# Patient Record
Sex: FEMALE | Race: WHITE | NOT HISPANIC OR LATINO | Employment: OTHER | URBAN - METROPOLITAN AREA
[De-identification: names, ages, dates, MRNs, and addresses within clinical notes are randomized per-mention and may not be internally consistent; named-entity substitution may affect disease eponyms.]

---

## 2017-01-03 PROBLEM — E78.6 LOW HDL (UNDER 40): Status: ACTIVE | Noted: 2017-01-03

## 2017-01-03 PROBLEM — E16.2 LOW BLOOD GLUCOSE MEASUREMENT: Status: ACTIVE | Noted: 2017-01-03

## 2017-01-16 PROBLEM — R53.1 WEAKNESS: Status: ACTIVE | Noted: 2017-01-16

## 2017-01-16 PROBLEM — R07.9 CHEST PAIN: Status: ACTIVE | Noted: 2017-01-16

## 2017-01-16 PROBLEM — R06.09 DOE (DYSPNEA ON EXERTION): Status: ACTIVE | Noted: 2017-01-16

## 2017-01-16 PROBLEM — R06.02 SOB (SHORTNESS OF BREATH): Status: ACTIVE | Noted: 2017-01-16

## 2017-01-16 PROBLEM — R55 SYNCOPE: Status: ACTIVE | Noted: 2017-01-16

## 2017-03-24 ENCOUNTER — OFFICE VISIT (OUTPATIENT)
Dept: UROLOGY | Facility: CLINIC | Age: 57
End: 2017-03-24
Payer: MEDICARE

## 2017-03-24 VITALS
RESPIRATION RATE: 18 BRPM | TEMPERATURE: 98 F | SYSTOLIC BLOOD PRESSURE: 150 MMHG | DIASTOLIC BLOOD PRESSURE: 102 MMHG | HEART RATE: 77 BPM

## 2017-03-24 DIAGNOSIS — N30.00 ACUTE CYSTITIS WITHOUT HEMATURIA: Primary | ICD-10-CM

## 2017-03-24 DIAGNOSIS — R33.9 INCOMPLETE EMPTYING OF BLADDER: ICD-10-CM

## 2017-03-24 DIAGNOSIS — R35.0 FREQUENCY OF MICTURITION: ICD-10-CM

## 2017-03-24 DIAGNOSIS — R35.1 NOCTURIA: ICD-10-CM

## 2017-03-24 LAB
BILIRUB SERPL-MCNC: ABNORMAL MG/DL
BLOOD URINE, POC: ABNORMAL
COLOR, POC UA: YELLOW
GLUCOSE UR QL STRIP: ABNORMAL
KETONES UR QL STRIP: ABNORMAL
LEUKOCYTE ESTERASE URINE, POC: ABNORMAL
NITRITE, POC UA: ABNORMAL
PH, POC UA: 5
PROTEIN, POC: ABNORMAL
SPECIFIC GRAVITY, POC UA: 1.01
UROBILINOGEN, POC UA: ABNORMAL

## 2017-03-24 PROCEDURE — 99204 OFFICE O/P NEW MOD 45 MIN: CPT | Mod: 25,S$GLB,, | Performed by: UROLOGY

## 2017-03-24 PROCEDURE — 3077F SYST BP >= 140 MM HG: CPT | Mod: S$GLB,,, | Performed by: UROLOGY

## 2017-03-24 PROCEDURE — 3080F DIAST BP >= 90 MM HG: CPT | Mod: S$GLB,,, | Performed by: UROLOGY

## 2017-03-24 PROCEDURE — 1160F RVW MEDS BY RX/DR IN RCRD: CPT | Mod: S$GLB,,, | Performed by: UROLOGY

## 2017-03-24 PROCEDURE — 81002 URINALYSIS NONAUTO W/O SCOPE: CPT | Mod: S$GLB,,, | Performed by: UROLOGY

## 2017-03-24 RX ORDER — DICYCLOMINE HYDROCHLORIDE 10 MG/1
10 CAPSULE ORAL
COMMUNITY
End: 2017-10-31

## 2017-03-24 RX ORDER — TAMSULOSIN HYDROCHLORIDE 0.4 MG/1
0.4 CAPSULE ORAL DAILY
Qty: 30 CAPSULE | Refills: 11 | Status: SHIPPED | OUTPATIENT
Start: 2017-03-24 | End: 2017-07-26

## 2017-03-24 NOTE — MR AVS SNAPSHOT
[place] [facility]  [address] [facility] [address]  Phone: [phone]  Fax: [phone]                  Janina Klein   3/24/2017 10:00 AM   Office Visit    Description:  Female : 1960   Provider:  Francoise Barrientos MD   Department:  [place] [facility]           Reason for Visit     Urinary Tract Infection           Diagnoses this Visit        Comments    Acute cystitis without hematuria    -  Primary            To Do List           Goals (5 Years of Data)     None       These Medications        Disp Refills Start End    tamsulosin (FLOMAX) 0.4 mg Cp24 30 capsule 11 3/24/2017 3/24/2018    Take 1 capsule (0.4 mg total) by mouth once daily. - Oral    Pharmacy: [facility] [address] [place] [address] AT [facility] [address] Ph #: [phone]         [facility] On Call     [facility] On Call Nurse Care Line -  Assistance  Registered nurses in the [facility] Center provide clinical advisement, health education, appointment booking, and other advisory services.  Call for this free service at [phone].             Medications           Message regarding Medications     Verify the changes and/or additions to your medication regime listed below are the same as discussed with your clinician today.  If any of these changes or additions are incorrect, please notify your healthcare provider.        START taking these NEW medications        Refills    tamsulosin (FLOMAX) 0.4 mg Cp24 11    Sig: Take 1 capsule (0.4 mg total) by mouth once daily.    Class: Normal    Route: Oral           Verify that the below list of medications is an accurate representation of the medications you are currently taking.  If none reported, the list may be blank. If incorrect, please contact your healthcare provider. Carry this list with you in case of emergency.           Current Medications     albuterol-ipratropium 2.5mg-0.5mg/3mL (DUO-NEB) 0.5 mg-3 mg(2.5 mg base)/3 mL  nebulizer solution USE 1 VIAL VIA NEBULIZER FOUR TIMES DAILY    compressor, for nebulizer Sabiha 1 each by Misc.(Non-Drug; Combo Route) route as directed.    dicyclomine (BENTYL) 10 MG capsule Take 10 mg by mouth 4 (four) times daily before meals and nightly.    gabapentin (NEURONTIN) 600 MG tablet Take 1 tablet (600 mg total) by mouth 2 (two) times daily.    hydrochlorothiazide (HYDRODIURIL) 12.5 MG Tab TAKE 1 TABLET BY MOUTH ONCE DAILY    metoprolol tartrate (LOPRESSOR) 50 MG tablet TAKE 1 TABLET BY MOUTH THREE TIMES DAILY    montelukast (SINGULAIR) 10 mg tablet TAKE 1 TABLET BY MOUTH EVERY DAY    pantoprazole (PROTONIX) 40 MG tablet TAKE 1 TABLET(40 MG) BY MOUTH TWICE DAILY    promethazine (PHENERGAN) 25 MG tablet TAKE 1 TABLET(25 MG) BY MOUTH EVERY 8 HOURS AS NEEDED FOR NAUSEA    sucralfate (CARAFATE) 1 gram tablet Take 1 tablet (1 g total) by mouth 4 (four) times daily before meals and nightly.    umeclidinium-vilanterol 62.5-25 mcg/actuation DsDv Inhale 1 puff into the lungs Daily.    albuterol 90 mcg/actuation inhaler Inhale 2 puffs into the lungs every 6 (six) hours as needed for Wheezing.    tamsulosin (FLOMAX) 0.4 mg Cp24 Take 1 capsule (0.4 mg total) by mouth once daily.           Clinical Reference Information           Your Vitals Were     BP Pulse Temp Resp          150/102 77 98.2 °F (36.8 °C) (Oral) 18        Blood Pressure          Most Recent Value    BP  (!)  150/102      Allergies as of 3/24/2017     Lisinopril      Immunizations Administered on Date of Encounter - 3/24/2017     None      Orders Placed During Today's Visit      Normal Orders This Visit    POCT URINE DIPSTICK WITHOUT MICROSCOPE          3/24/2017 10:30 AM - Suzi Rutherford MA      Component Results     Component    Color    yellow    Spec Grav    1.010    pH, UA    5.0    WBC, UA    neg    Nitrite    neg    Protein    trace    Glucose, UA    neg    Ketones, UA    neg    Urobilinogen    neg    Bilirubin    neg    Blood, UA    neg             Smoking Cessation     If you would like to quit smoking:   You may be eligible for free services if you are a [place] resident and started smoking cigarettes before September 1, 1988.  Call the Smoking Cessation Trust (SCT) toll free at (333) 108-1540 or (240) 750-2254.   Call 1-800-QUIT-NOW if you do not meet the above criteria.            Language Assistance Services     ATTENTION: Language assistance services are available, free of charge. Please call [phone].      ATENCIÓN: Si habla marianna, tiene a campoverde disposición servicios gratuitos de asistencia lingüística. Llame al [phone].     CHÚ Ý: N?u b?n nói Ti?ng Vi?t, có các d?ch v? h? tr? ngôn ng? mi?n phí dành cho b?n. G?i s? [phone].         [place] [facility] complies with applicable Federal civil rights laws and does not discriminate on the basis of race, color, national origin, age, disability, or sex.

## 2017-03-24 NOTE — PROGRESS NOTES
The patient was seen at [facility].    AGE:  57.    DRUG ALLERGIES:  Lisinopril.    CHIEF COMPLAINT:  Lower urinary tract symptoms with feeling of inadequate   bladder emptying.    HISTORY OF PRESENT ILLNESS:  This 57-year-old female presents with a two-year   history of lower urinary tract symptoms that consist of urgency and feeling of   inadequate emptying of the bladder, nocturia, and frequency.  She was placed on   a trial of bethanechol by Dr. Almanza, her gynecologist, but she did not   notice any improvement and she has since discontinued it.  She has no other   prior  history and has never undergone any prior  workup.    PAST MEDICAL HISTORY:  Hypertension, COPD, irritable bowel syndrome, GERD and   peptic ulcer disease.    PAST SURGICAL HISTORY:  She has had two ectopic pregnancies,  section,   hysterectomy, appendectomy and umbilical hernia repair.    PRESENT MEDICATIONS:  Include albuterol, Bentyl, Neurontin, hydrochlorothiazide,   Lopressor, Singulair, Protonix, Phenergan, carafate.    FAMILY HISTORY:  Grandmother had colon cancer.  Three sisters living and well.    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  Tobacco, still smokes a pack a day.  Alcohol none.    REVIEW OF SYMPTOMS:  GENERAL:  No weight change.  Good appetite.  HEAD AND NECK:  She does experience frequent episodes of headaches.  Wears   prescription glasses.  CARDIORESPIRATORY:  No chest pain or shortness of breath.  No cough.  GASTROINTESTINAL:  No trouble swallowing.  No constipation or diarrhea.  MUSCULOSKELETAL:  Having some chronic back problems, osteoarthritis, and carpal   tunnel.    PHYSICAL EXAMINATION:  VITAL SIGNS:  BP of 150/102, pulse 77, temperature 98.2, respiration 18.  GENERAL:  A well-developed, well-nourished 57-year-old female, alert, oriented,   cooperative, in no acute distress.  HEAD AND NECK:  Throat, clear.  No  adenopathy.  CHEST:  Clear.  HEART:  Regular.  No murmur.  ABDOMEN:  Soft, benign and nontender, but there is some fullness in the   suprapubic area.  PELVIC EXAMINATION:  Normal female introitus.  No mass.  No blood.  No   tenderness.  RECTAL:  No mass.  No blood.  No tenderness.    UA was negative with pH 5.0.    A bladder scan was obtained, which revealed 343 mL of residual urine.  The   patient subsequently proceeded to void again after the bladder scan and she   voided approximately 100 mL, so she still had over 200 mL of residual urine.    FINAL IMPRESSION:  Chronic lower urinary tract symptoms, incomplete bladder   emptying.    RECOMMENDATIONS:  Renal ultrasound, trial on Flomax 0.4 mg p.o. daily, further    workup such as cystoscopy and urodynamic evaluation may also need to be   considered.  This was discussed with the patient and she stated she understood   and agreed.      QUINTON  dd: 03/24/2017 15:47:42 (CDT)  td: 03/25/2017 05:59:38 (CDT)  Doc ID   #[id number]  Job ID #[id number]    CC: Song Almanza M.D.

## 2017-03-24 NOTE — LETTER
March 24, 2017      Song Almanza MD  [address]  [facility] [place] [address]           [place] [facility]  [address] [facility] [place] [address]  Phone: [phone]  Fax: [phone]          Patient: Janina Klein   MR Number: [id number]   YOB: 1960   Date of Visit: 3/24/2017       Dear Dr. Song Almanza:    Thank you for referring Janina Klein to me for evaluation. Attached you will find relevant portions of my assessment and plan of care.    If you have questions, please do not hesitate to call me. I look forward to following Janina Klein along with you.    Sincerely,    Francoise Barrientos MD    Enclosure  CC:  No Recipients    If you would like to receive this communication electronically, please contact [email or web address] or [phone] to request more information on [employer] Link access.    For providers and/or their staff who would like to refer a patient to [facility], please contact us through our one-stop-shop provider referral line, [facility], at [phone].    If you feel you have received this communication in error or would no longer like to receive these types of communications, please e-mail [email or web address]

## 2017-04-04 PROBLEM — Z86.69 H/O CARPAL TUNNEL SYNDROME: Status: ACTIVE | Noted: 2017-04-04

## 2017-04-04 PROBLEM — R39.14 FEELING OF INCOMPLETE BLADDER EMPTYING: Status: ACTIVE | Noted: 2017-04-04

## 2017-04-04 PROBLEM — R35.1 NOCTURIA: Status: ACTIVE | Noted: 2017-04-04

## 2017-04-04 PROBLEM — R20.2 PARESTHESIA: Status: ACTIVE | Noted: 2017-04-04

## 2017-04-04 PROBLEM — Z79.899 USES NEBULIZER AND INHALER AT HOME: Status: ACTIVE | Noted: 2017-04-04

## 2017-04-04 PROBLEM — R35.0 FREQUENCY OF URINATION: Status: ACTIVE | Noted: 2017-04-04

## 2017-04-07 ENCOUNTER — OFFICE VISIT (OUTPATIENT)
Dept: UROLOGY | Facility: CLINIC | Age: 57
End: 2017-04-07
Payer: MEDICARE

## 2017-04-07 VITALS — DIASTOLIC BLOOD PRESSURE: 91 MMHG | HEART RATE: 98 BPM | SYSTOLIC BLOOD PRESSURE: 137 MMHG | TEMPERATURE: 98 F

## 2017-04-07 DIAGNOSIS — R33.9 RETENTION OF URINE: ICD-10-CM

## 2017-04-07 DIAGNOSIS — R33.9 INCOMPLETE EMPTYING OF BLADDER: Primary | ICD-10-CM

## 2017-04-07 PROCEDURE — 3075F SYST BP GE 130 - 139MM HG: CPT | Mod: S$GLB,,, | Performed by: UROLOGY

## 2017-04-07 PROCEDURE — 1160F RVW MEDS BY RX/DR IN RCRD: CPT | Mod: S$GLB,,, | Performed by: UROLOGY

## 2017-04-07 PROCEDURE — 99213 OFFICE O/P EST LOW 20 MIN: CPT | Mod: S$GLB,,, | Performed by: UROLOGY

## 2017-04-07 PROCEDURE — 3080F DIAST BP >= 90 MM HG: CPT | Mod: S$GLB,,, | Performed by: UROLOGY

## 2017-04-07 RX ORDER — FAMOTIDINE 40 MG/1
TABLET, FILM COATED ORAL
Refills: 11 | COMMUNITY
Start: 2017-03-30 | End: 2017-07-26 | Stop reason: SDUPTHER

## 2017-04-07 RX ORDER — LANSOPRAZOLE 30 MG/1
CAPSULE, DELAYED RELEASE ORAL
Refills: 11 | COMMUNITY
Start: 2017-03-30 | End: 2017-07-26 | Stop reason: SDUPTHER

## 2017-04-07 RX ORDER — UMECLIDINIUM 62.5 UG/1
AEROSOL, POWDER ORAL
Refills: 11 | COMMUNITY
Start: 2017-04-01 | End: 2017-04-19

## 2017-04-07 NOTE — PROGRESS NOTES
This patient was seen at [facility].    CHIEF COMPLAINT:  Incomplete bladder emptying, chronic lower urinary tract   symptoms.    HISTORY OF PRESENT ILLNESS:  This 57-year-old female returns for routine   recheck.  She has a long-term history of lower tract symptoms with incomplete   bladder emptying and at her last visit on 03/24/2017, she was placed on a trial   of Flomax 0.4 mg p.o. daily and on today's visit, she states she has noticed   significant improvement in her voiding.  She did undergo a renal ultrasound on   04/06/2017, which did reveal normal kidneys, but there was increased postvoid   residual of 302 mL.    On physical exam, abdomen is soft, benign.  No masses, no tenderness.    Bladder scan revealed 206 mL of postvoid residual, which is an improvement over   the 302 that was noted on the ultrasound also compared to her prior visit.  UA,   none given.    FINAL IMPRESSION:  Incomplete bladder emptying, chronic lower urinary tract   symptoms.    RECOMMENDATIONS:  Continue with the Flomax 0.4 mg p.o. daily.  It was discussed   with the patient that we will need to proceed with further workup to determine   further information concerning her incomplete bladder emptying and recommended   that we proceed with cystoscopy, which will be scheduled in two weeks on   04/21/2017.  She stated she understood and agreed and orders were placed and   consent signed.      QUINTON  dd: 04/07/2017 09:18:34 (CDT)  td: 04/08/2017 05:07:03 (CDT)  Doc ID   #[id number]  Job ID #[id number]    CC:

## 2017-04-17 ENCOUNTER — TELEPHONE (OUTPATIENT)
Dept: ORTHOPEDICS | Facility: CLINIC | Age: 57
End: 2017-04-17

## 2017-04-17 ENCOUNTER — OFFICE VISIT (OUTPATIENT)
Dept: ORTHOPEDICS | Facility: CLINIC | Age: 57
End: 2017-04-17
Payer: MEDICARE

## 2017-04-17 VITALS
BODY MASS INDEX: 28.34 KG/M2 | WEIGHT: 166 LBS | DIASTOLIC BLOOD PRESSURE: 80 MMHG | SYSTOLIC BLOOD PRESSURE: 115 MMHG | HEART RATE: 78 BPM | HEIGHT: 64 IN

## 2017-04-17 DIAGNOSIS — G56.03 BILATERAL CARPAL TUNNEL SYNDROME: Primary | ICD-10-CM

## 2017-04-17 PROCEDURE — 99213 OFFICE O/P EST LOW 20 MIN: CPT | Mod: S$GLB,,, | Performed by: ORTHOPAEDIC SURGERY

## 2017-04-17 PROCEDURE — 1160F RVW MEDS BY RX/DR IN RCRD: CPT | Mod: S$GLB,,, | Performed by: ORTHOPAEDIC SURGERY

## 2017-04-17 PROCEDURE — 3074F SYST BP LT 130 MM HG: CPT | Mod: S$GLB,,, | Performed by: ORTHOPAEDIC SURGERY

## 2017-04-17 PROCEDURE — 3079F DIAST BP 80-89 MM HG: CPT | Mod: S$GLB,,, | Performed by: ORTHOPAEDIC SURGERY

## 2017-04-17 NOTE — PROGRESS NOTES
Past Medical History:   Diagnosis Date    Chronic pain     COPD (chronic obstructive pulmonary disease)     GERD (gastroesophageal reflux disease)     Hypertension     IBS (irritable bowel syndrome)     [facility] 2004    Palpitations        Past Surgical History:   Procedure Laterality Date    APPENDECTOMY      COLONOSCOPY  2013    ? results in [place]    COLONOSCOPY  04/17/2015    Dr. Farfan   diverticulosis    HERNIA REPAIR      HYSTERECTOMY  9/3/2014    with bso    TOTAL ABDOMINAL HYSTERECTOMY W/ BILATERAL SALPINGOOPHORECTOMY Bilateral        Current Outpatient Prescriptions   Medication Sig    albuterol-ipratropium 2.5mg-0.5mg/3mL (DUO-NEB) 0.5 mg-3 mg(2.5 mg base)/3 mL nebulizer solution USE 1 VIAL VIA NEBULIZER FOUR TIMES DAILY    compressor, for nebulizer Sabiha 1 each by Misc.(Non-Drug; Combo Route) route as directed.    dicyclomine (BENTYL) 10 MG capsule Take 10 mg by mouth 4 (four) times daily before meals and nightly.    famotidine (PEPCID) 40 MG tablet     gabapentin (NEURONTIN) 300 MG capsule TAKE ONE CAPSULE BY MOUTH TWICE DAILY    gabapentin (NEURONTIN) 600 MG tablet Take 1 tablet (600 mg total) by mouth 2 (two) times daily.    hydrochlorothiazide (HYDRODIURIL) 12.5 MG Tab TAKE 1 TABLET BY MOUTH ONCE DAILY    INCRUSE ELLIPTA 62.5 mcg/actuation DsDv INHALE 1 PUFF INTO THE LUNGS ONCE D    lansoprazole (PREVACID) 30 MG capsule TK 1 C PO QD    metoprolol tartrate (LOPRESSOR) 50 MG tablet TAKE 1 TABLET BY MOUTH THREE TIMES DAILY    montelukast (SINGULAIR) 10 mg tablet TAKE 1 TABLET BY MOUTH EVERY DAY    pantoprazole (PROTONIX) 40 MG tablet TAKE 1 TABLET(40 MG) BY MOUTH TWICE DAILY    promethazine (PHENERGAN) 25 MG tablet TAKE 1 TABLET(25 MG) BY MOUTH EVERY 8 HOURS AS NEEDED FOR NAUSEA    sucralfate (CARAFATE) 1 gram tablet Take 1 tablet (1 g total) by mouth 4 (four) times daily before meals and nightly.    tamsulosin (FLOMAX) 0.4 mg Cp24 Take 1 capsule (0.4 mg  total) by mouth once daily.    umeclidinium-vilanterol 62.5-25 mcg/actuation DsDv Inhale 1 puff into the lungs Daily.    albuterol 90 mcg/actuation inhaler Inhale 2 puffs into the lungs every 6 (six) hours as needed for Wheezing.     No current facility-administered medications for this visit.        Review of patient's allergies indicates:   Allergen Reactions    Lisinopril        History reviewed. No pertinent family history.    Social History     Social History    Marital status:      Spouse name: N/A    Number of children: N/A    Years of education: N/A     Occupational History    Not on file.     Social History Main Topics    Smoking status: Current Every Day Smoker     Packs/day: 1.00    Smokeless tobacco: Not on file    Alcohol use Not on file    Drug use: Not on file    Sexual activity: Not on file     Other Topics Concern    Not on file     Social History Narrative       Chief Complaint:   Chief Complaint   Patient presents with    Wrist Pain     BILATERAL WRIST PAIN       Consulting Physician: No ref. provider found    History of present illness: This is a 56-year-old woman with a complaint of bilateral wrist pain for years.  She reports numbness in her median 3 fingers.  She denies any injury.  She states that she has had a EMG of her wrist that demonstrated carpal tunnel.  She puts her pain at a constant 9 out of 10.  She states that both the right and left are equal.  She is right-hand dominant.  The injections that we gave her at her last visit were not helpful.      Review of Systems:    Constitution: Denies chills, fever, and sweats.    HENT: Denies headaches or blurry vision.    Cardiovascular: Denies chest pain or irregular heart beat.    Respiratory: Denies cough or shortness of breath.    Gastrointestinal: Denies abdominal pain, nausea, or vomiting.    Musculoskeletal:  Denies muscle cramps.    Neurological: Denies dizziness or focal weakness.    Psychiatric/Behavioral:  Normal mental status.    Hematologic/Lymphatic: Denies bleeding problem or easy bruising/bleeding.    Skin: Denies rash or suspicious lesions.      Examination:    Vital Signs:    Vitals:    04/17/17 1420   BP: 115/80   Pulse: 78       Body mass index is 28.49 kg/(m^2).    This a well-developed, well nourished patient in no acute distress.    Alert and oriented and cooperative to examination.       Physical Exam: Left Wrist Exam    Skin  Scars:   None  Rash:   None    Inspection  Erythema:  None  Bruising:  None  Swelling:  None  Masses  None  Lymphadenopathy: None    Coordination:  Normal  Instability:  None    Range of Motion  Volar Flexion:  Normal  Dorsal Extension: Normal  Radial Deviation: Normal  Ulnar Deviation: Normal  Finger ROM:  Full    Strength:  Normal     Tenderness  Radial:   None  Ulnar:   None  Snuffbox:  None    Pulse:   2+ radial  Sensation:  Intact    Tinel's:   Negative  Finkelstein's:  Negative      Right Wrist Exam    Skin  Scars:   None  Rash:   None    Inspection  Erythema:  None  Bruising:  None  Swelling:  None  Masses  None  Lymphadenopathy: None    Coordination:  Normal  Instability:  None    Range of Motion  Volar Flexion:  Normal  Dorsal Extension: Normal  Radial Deviation: Normal  Ulnar Deviation: Normal  Finger ROM:  Full    Strength:  Normal     Tenderness  Radial:   None  Ulnar:   None  Snuffbox:  None    Pulse:   2+ radial  Sensation:  Intact    Tinel's:   Negative  Finkelstein's:  Negative          Imaging:      Assessment: Bilateral carpal tunnel syndrome        Plan:  The injections were not helpful and she is interested in having surgery.  We will refer her down to see our hand surgeon.    DISCLAIMER: This note may have been dictated using voice recognition software and may contain grammatical errors.     NOTE: Consult report sent to referring provider via [employer].

## 2017-04-17 NOTE — TELEPHONE ENCOUNTER
----- Message from Callie Alberto LPN sent at 4/17/2017  2:45 PM CDT -----  Please contact pt to schedule consult for carpal tunnel surgery.  Thank you.  Callie

## 2017-04-19 PROBLEM — E78.1 HIGH TRIGLYCERIDES: Status: ACTIVE | Noted: 2017-04-19

## 2017-04-20 ENCOUNTER — TELEPHONE (OUTPATIENT)
Dept: UROLOGY | Facility: CLINIC | Age: 57
End: 2017-04-20

## 2017-04-20 PROBLEM — E87.6: Status: ACTIVE | Noted: 2017-04-20

## 2017-04-20 PROBLEM — E87.6 HYPOKALEMIA: Status: ACTIVE | Noted: 2017-04-20

## 2017-04-20 NOTE — TELEPHONE ENCOUNTER
----- Message from Stacie Callaway sent at 4/20/2017 10:47 AM CDT -----  Patient has an appointment on 5/5/17. She states that she was supposed to see doctor tomorrow in [place]. She would like to talk to office concerning this. Please call back at [phone].

## 2017-04-20 NOTE — TELEPHONE ENCOUNTER
Spoke with patient and informed her that the 5/5/17 appt is her follow up appt . Patient also states she is scheduled to see Dr Brown at 1:30 p.m today in regard to low potassium. Will follow up after appt in regard to cysto scheduled with dr Barrientos on tomorrow

## 2017-04-20 NOTE — TELEPHONE ENCOUNTER
Spoke with lindsay with [facility] informed that due to critical potassium level of 2.8 that was called in procedure that was scheduled for 4/21/17 has been canceled per RENATO Rose NP. Patient is to have  Potassium rechecked in one week. Dr Barrientos informed

## 2017-04-20 NOTE — TELEPHONE ENCOUNTER
Received call from [facility]  Melva who reported at critical Potassium level. Pt is scheduled for cysto tomorrow at [facility]. Informed Dr Barrientos who requested a call be placed to Dr Brown Office to see what he would like to do in regards to the low potassium level. Spoke with remedios who than had me speak with a nurse who did not give her name     with Dr brown office who was  informed of critical value level the nurse stated that the provider is in clinic. I asked if there was any way  we can get provider recommendations. The nurse  states again the provider is in clinic and that  I would be able to view the note in EPIC. Telephone number/ callback number still provided.  I ask for the nurse's name and the call ended

## 2017-04-20 NOTE — TELEPHONE ENCOUNTER
Spoke with patient informed procedure is on hold. Verbalized understanding awaiting  next weeks labs and clearance by PCP

## 2017-04-27 ENCOUNTER — TELEPHONE (OUTPATIENT)
Dept: UROLOGY | Facility: CLINIC | Age: 57
End: 2017-04-27

## 2017-04-27 NOTE — TELEPHONE ENCOUNTER
----- Message from Shruti Fonseca sent at 4/27/2017  2:31 PM CDT -----  Patient requesting to speak with nurse concerning scheduling procedure/please call back at [phone].

## 2017-04-27 NOTE — TELEPHONE ENCOUNTER
Spoke with patient informed that clearance has been received. Will give to Dr Barrientos to review

## 2017-05-22 PROBLEM — R06.09 DOE (DYSPNEA ON EXERTION): Status: RESOLVED | Noted: 2017-01-16 | Resolved: 2017-05-22

## 2017-05-22 PROBLEM — R06.02 SOB (SHORTNESS OF BREATH): Status: RESOLVED | Noted: 2017-01-16 | Resolved: 2017-05-22

## 2017-05-22 PROBLEM — R35.0 FREQUENCY OF URINATION: Status: RESOLVED | Noted: 2017-04-04 | Resolved: 2017-05-22

## 2017-05-22 PROBLEM — R53.1 WEAKNESS: Status: RESOLVED | Noted: 2017-01-16 | Resolved: 2017-05-22

## 2017-05-22 PROBLEM — R07.9 CHEST PAIN: Status: RESOLVED | Noted: 2017-01-16 | Resolved: 2017-05-22

## 2017-05-22 PROBLEM — R35.1 NOCTURIA: Status: RESOLVED | Noted: 2017-04-04 | Resolved: 2017-05-22

## 2017-05-22 PROBLEM — R39.14 FEELING OF INCOMPLETE BLADDER EMPTYING: Status: RESOLVED | Noted: 2017-04-04 | Resolved: 2017-05-22

## 2017-07-14 ENCOUNTER — OFFICE VISIT (OUTPATIENT)
Dept: UROLOGY | Facility: CLINIC | Age: 57
End: 2017-07-14
Payer: MEDICARE

## 2017-07-14 VITALS
SYSTOLIC BLOOD PRESSURE: 137 MMHG | HEIGHT: 64 IN | RESPIRATION RATE: 18 BRPM | HEART RATE: 76 BPM | TEMPERATURE: 98 F | DIASTOLIC BLOOD PRESSURE: 94 MMHG | WEIGHT: 165 LBS | BODY MASS INDEX: 28.17 KG/M2

## 2017-07-14 PROCEDURE — 99213 OFFICE O/P EST LOW 20 MIN: CPT | Mod: S$GLB,,, | Performed by: UROLOGY

## 2017-07-14 RX ORDER — AMITRIPTYLINE HYDROCHLORIDE 25 MG/1
TABLET, FILM COATED ORAL
Refills: 6 | COMMUNITY
Start: 2017-06-29 | End: 2017-10-31

## 2017-07-14 RX ORDER — UMECLIDINIUM 62.5 UG/1
AEROSOL, POWDER ORAL
Refills: 11 | COMMUNITY
Start: 2017-06-13 | End: 2017-07-29

## 2017-07-14 NOTE — PROGRESS NOTES
This patient was seen at [facility]    CHIEF COMPLAINT:  Urethral stenosis.    HISTORY OF PRESENT ILLNESS:  This 57-year-old female returns for routine   recheck.  She had undergone cystoscopy and urethral dilatation of a severe   urethral stenosis on 05/05/2017.  Since then, she states she has been voiding   much better and is back to normal and is very happy with her voiding status and   no longer needed to take the Flomax.    UA, none given.    FINAL IMPRESSION:  Urethral stenosis that has responded well to urethral   dilatation.    RECOMMENDATIONS:  Continue with observation with recheck in four months.      MD/JUAN  dd: 07/14/2017 10:37:37 (CDT)  td: 07/15/2017 11:28:21 (CDT)  Doc ID   #[id number]  Job ID #[id number]    CC:

## 2017-07-29 PROBLEM — R55 SYNCOPE: Status: RESOLVED | Noted: 2017-01-16 | Resolved: 2017-07-29

## 2017-07-29 PROBLEM — Z87.11 HISTORY OF STOMACH ULCERS: Status: ACTIVE | Noted: 2017-04-04

## 2017-07-29 PROBLEM — Z87.19 HISTORY OF BARRETT'S ESOPHAGUS: Status: ACTIVE | Noted: 2017-07-29

## 2017-07-29 PROBLEM — E87.6 HYPOKALEMIA: Status: RESOLVED | Noted: 2017-04-20 | Resolved: 2017-07-29

## 2017-07-29 PROBLEM — E16.2 LOW BLOOD GLUCOSE MEASUREMENT: Status: RESOLVED | Noted: 2017-01-03 | Resolved: 2017-07-29

## 2017-08-24 PROBLEM — J06.9 UPPER RESPIRATORY TRACT INFECTION: Status: ACTIVE | Noted: 2017-08-24

## 2018-01-05 ENCOUNTER — OFFICE VISIT (OUTPATIENT)
Dept: PAIN MEDICINE | Facility: CLINIC | Age: 58
End: 2018-01-05
Payer: MEDICARE

## 2018-01-05 VITALS
SYSTOLIC BLOOD PRESSURE: 138 MMHG | WEIGHT: 160 LBS | BODY MASS INDEX: 27.31 KG/M2 | HEIGHT: 64 IN | HEART RATE: 79 BPM | DIASTOLIC BLOOD PRESSURE: 91 MMHG

## 2018-01-05 DIAGNOSIS — M47.896 OTHER SPONDYLOSIS, LUMBAR REGION: Primary | ICD-10-CM

## 2018-01-05 PROCEDURE — 99204 OFFICE O/P NEW MOD 45 MIN: CPT | Mod: S$GLB,,, | Performed by: ANESTHESIOLOGY

## 2018-01-05 PROCEDURE — 99999 PR PBB SHADOW E&M-EST. PATIENT-LVL III: CPT | Mod: PBBFAC,,, | Performed by: ANESTHESIOLOGY

## 2018-01-05 NOTE — PROGRESS NOTES
This note was completed with dictation software and grammatical errors may exist.    Referring Physician: Sherlyn Emery    PCP: Brant Brown III, MD    CC:  Low back pain    HPI:   Janina Klein is a 57 y.o. female who has lived with low back pain for ~ 30 years. 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 Over the past few years her low back pain has worsened, particularly when she is doing pushing, pulling maneuvers, raking, or mopping. The pain begins in her back and radiates laterally. Describes as burning, tight, tingling, & numb. Does describe radiation down legs, however intermittently, approximately once a month. The back pain is more bothersome. She has attempted physical therapy, which worsened her pain. Heat packs help. Was placed on gabapentin 800 mg TID, which helps some with her back pain, more so with her other complaints of neck and hand pain. She was referred by her PCP for further options.     ROS:  CONSTITUTIONAL: No fevers, chills, night sweats, wt. loss, appetite changes  SKIN: no rashes or itching  ENT: No headaches, head trauma, vision changes, or eye pain  LYMPH NODES: None noticed   CV: No chest pain, palpitations.   RESP: No shortness of breath, dyspnea on exertion, cough, wheezing, or hemoptysis  GI: No nausea, emesis, diarrhea, constipation, melena, hematochezia, pain.    : No dysuria, hematuria, urgency, or frequency   HEME: No easy bruising, bleeding problems  PSYCHIATRIC: No depression, anxiety, psychosis, hallucinations.  NEURO: No seizures, memory loss, dizziness or difficulty sleeping  MSK: +HPI      Past Medical History:   Diagnosis Date    Chronic pain     COPD (chronic obstructive pulmonary disease)     GERD (gastroesophageal reflux disease)     Hypertension     IBS (irritable bowel syndrome)     [facility] Diagnosed 2004    Palpitations      Past Surgical History:   Procedure Laterality Date    APPENDECTOMY      COLONOSCOPY  2013    ? results in [place]  "   COLONOSCOPY  04/17/2015    Dr. Farfan   diverticulosis    CYSTOSCOPY      HERNIA REPAIR      HYSTERECTOMY  9/3/2014    with bso    TOTAL ABDOMINAL HYSTERECTOMY W/ BILATERAL SALPINGOOPHORECTOMY Bilateral      History reviewed. No pertinent family history.  Social History     Social History    Marital status:      Spouse name: N/A    Number of children: N/A    Years of education: N/A     Social History Main Topics    Smoking status: Current Every Day Smoker     Packs/day: 1.00    Smokeless tobacco: None    Alcohol use None    Drug use: Unknown    Sexual activity: Not Asked     Other Topics Concern    None     Social History Narrative    None         Medications/Allergies: See med card    Vitals:    01/05/18 0944   BP: (!) 138/91   Pulse: 79   Weight: 72.6 kg (160 lb)   Height: 5' 4" (1.626 m)   PainSc: 10-Worst pain ever   PainLoc: Back         Physical exam:    GENERAL: A and O x3, the patient appears well groomed and is in no acute distress.  Skin: No rashes or obvious lesions  HEENT: normocephalic, atraumatic  CARDIOVASCULAR:  Palpable peripheral pulses  LUNGS: easy work of breathing  ABDOMEN: soft, nontender   UPPER EXTREMITIES: Normal alignment, normal range of motion, no atrophy, no skin changes,  hair growth and nail growth normal and equal bilaterally. No swelling, no tenderness.    LOWER EXTREMITIES:  Normal alignment, normal range of motion, no atrophy, no skin changes,  hair growth and nail growth normal and equal bilaterally. No swelling, no tenderness.  LUMBAR SPINE  Lumbar spine: ROM is full with flexion extension and oblique extension with increased pain upon flexion and extension  Thrust & Darrion's test causes no increased pain on either side.    Supine straight leg raise is negative bilaterally.    Internal and external rotation of the hip causes no increased pain on either side.  Myofascial exam: Tender to palpation along facet joints and paraspinous muscles of ~ " L4-L5      MENTAL STATUS: normal orientation, speech, language, and fund of knowledge for social situation.  Emotional state appropriate.    CRANIAL NERVES:  II:  PERRL bilaterally,   III,IV,VI: EOMI.    V:  Facial sensation equal bilaterally  VII:  Facial motor function normal.  VIII:  Hearing equal to finger rub bilaterally  IX/X: Gag normal, palate symmetric  XI:  Shoulder shrug equal, head turn equal  XII:  Tongue midline without fasciculations      MOTOR: Tone and bulk: normal bilateral lower Strength: normal     IP ADD ABD Quad TA Gas HAM  R 5 5 5 5 5 5 5  L 5 5 5 5 5 5 5    SENSATION: Light touch and pinprick intact bilaterally  REFLEXES: normal, symmetric, nonbrisk.  Toes down, no clonus. No hoffmans.  GAIT: normal rise, base, steps, and arm swing.        Imaging:  MRI L-spine 10/2016  T12/L1: A mild bulge is seen but no canal stenosis. The neural foramina  are patent.  L1/2: Unremarkable.  L2/3: Unremarkable.  L3/4: A mild bulge is seen. No canal stenosis is seen. The neural  foramina are patent.  L4/5: A mild diffuse bulge is seen. No significant canal stenosis is  noted. The neural foramina are patent bilaterally.  L5/S1: A mild diffuse bulge is seen which is progressive. No central  canal stenosis is noted. The neural foramina are patent.    Assessment:  58 yo female with longstanding history of back pain with rare radicular symptoms referred for further treatment options.   1. Other spondylosis, lumbar region          Plan:  - I have stressed the importance of physical activity and exercise to improve overall health  - I believe her low back pain maybe due to facet arthropathy and have recommended lumbar medial branch blocks as a diagnostic procedure.  If successful, would proceed with radiofrequency ablation.  She will consider this option and contact us if she wishes to proceed  - Continue gabapentin as prescribed  - She will follow up as needed

## 2018-01-23 PROBLEM — Z79.899 USES NEBULIZER AND INHALER AT HOME: Status: RESOLVED | Noted: 2017-04-04 | Resolved: 2018-01-23

## 2018-01-23 PROBLEM — R20.2 PARESTHESIA: Status: RESOLVED | Noted: 2017-04-04 | Resolved: 2018-01-23

## 2018-01-23 PROBLEM — J06.9 UPPER RESPIRATORY TRACT INFECTION: Status: RESOLVED | Noted: 2017-08-24 | Resolved: 2018-01-23

## 2018-04-30 ENCOUNTER — OFFICE VISIT (OUTPATIENT)
Dept: SURGERY | Facility: CLINIC | Age: 58
End: 2018-04-30
Payer: MEDICARE

## 2018-04-30 VITALS
RESPIRATION RATE: 18 BRPM | DIASTOLIC BLOOD PRESSURE: 90 MMHG | OXYGEN SATURATION: 95 % | TEMPERATURE: 98 F | BODY MASS INDEX: 27.31 KG/M2 | WEIGHT: 160 LBS | HEIGHT: 64 IN | HEART RATE: 67 BPM | SYSTOLIC BLOOD PRESSURE: 148 MMHG

## 2018-04-30 DIAGNOSIS — R19.5 HEME POSITIVE STOOL: Primary | ICD-10-CM

## 2018-04-30 DIAGNOSIS — K52.9 CHRONIC DIARRHEA: ICD-10-CM

## 2018-04-30 DIAGNOSIS — K22.70 BARRETT'S ESOPHAGUS WITHOUT DYSPLASIA: ICD-10-CM

## 2018-04-30 DIAGNOSIS — J44.9 CHRONIC OBSTRUCTIVE PULMONARY DISEASE, UNSPECIFIED COPD TYPE: ICD-10-CM

## 2018-04-30 PROBLEM — E87.6: Status: RESOLVED | Noted: 2017-04-20 | Resolved: 2018-04-30

## 2018-04-30 PROBLEM — E78.6 LOW HDL (UNDER 40): Status: RESOLVED | Noted: 2017-01-03 | Resolved: 2018-04-30

## 2018-04-30 PROCEDURE — 99205 OFFICE O/P NEW HI 60 MIN: CPT | Mod: S$GLB,,, | Performed by: SURGERY

## 2018-04-30 PROCEDURE — 3077F SYST BP >= 140 MM HG: CPT | Mod: CPTII,S$GLB,, | Performed by: SURGERY

## 2018-04-30 PROCEDURE — 3080F DIAST BP >= 90 MM HG: CPT | Mod: CPTII,S$GLB,, | Performed by: SURGERY

## 2018-04-30 RX ORDER — ACETAMINOPHEN AND CODEINE PHOSPHATE 300; 30 MG/1; MG/1
1 TABLET ORAL 2 TIMES DAILY PRN
Refills: 0 | COMMUNITY
Start: 2018-03-27 | End: 2018-10-22 | Stop reason: ALTCHOICE

## 2018-04-30 RX ORDER — FAMOTIDINE 40 MG/1
40 TABLET, FILM COATED ORAL DAILY
COMMUNITY
End: 2018-10-22 | Stop reason: ALTCHOICE

## 2018-04-30 RX ORDER — SODIUM CHLORIDE, SODIUM LACTATE, POTASSIUM CHLORIDE, CALCIUM CHLORIDE 600; 310; 30; 20 MG/100ML; MG/100ML; MG/100ML; MG/100ML
INJECTION, SOLUTION INTRAVENOUS CONTINUOUS
Status: CANCELLED | OUTPATIENT
Start: 2018-04-30

## 2018-04-30 RX ORDER — SODIUM, POTASSIUM,MAG SULFATES 17.5-3.13G
SOLUTION, RECONSTITUTED, ORAL ORAL
Qty: 2 BOTTLE | Refills: 0 | Status: SHIPPED | OUTPATIENT
Start: 2018-04-30 | End: 2019-10-29

## 2018-04-30 RX ORDER — GABAPENTIN 100 MG/1
800 CAPSULE ORAL 3 TIMES DAILY
COMMUNITY
Start: 2018-04-01 | End: 2018-04-30

## 2018-04-30 RX ORDER — BACLOFEN 10 MG/1
10 TABLET ORAL 2 TIMES DAILY
COMMUNITY
Start: 2018-03-09 | End: 2018-10-22 | Stop reason: ALTCHOICE

## 2018-04-30 RX ORDER — LIDOCAINE HYDROCHLORIDE 10 MG/ML
1 INJECTION, SOLUTION EPIDURAL; INFILTRATION; INTRACAUDAL; PERINEURAL ONCE
Status: CANCELLED | OUTPATIENT
Start: 2018-04-30 | End: 2018-04-30

## 2018-04-30 NOTE — PROGRESS NOTES
Subjective:       Patient ID: Janina Klein is a 58 y.o. female.    Chief Complaint: Consult and Gall Bladder Problem      HPI:  Ms. Klein presents today as a consult from Lisa Cooksey NP-C.  Ms. Klein has complaints of chronic postprandial nausea and diarrhea.  Previous evaluation several years ago included an EGD, colonoscopy, ultrasound, and HIDA scan.  No abdominal pain.  No vomiting.  No fever, chills, night sweats, weight loss, constipation, melena, hematochezia, hematemesis, jaundice, biliuria, acholia, etc.  No other associated symptoms.  No specific food intolerances.  No other aggravating factors.  No alleviating factors other than over-the-counter Imodium A-D.    EGD 6/16/15 confirmed a healing gastric ulcer EGD 4/17/15 revealed two deep cratered pyloric channel ulcers.  Biopsies confirmed Yousif's esophagus with no dysplasia and healing gastric ulcers with no evidence of Helicobacter.  Colonoscopy 4/17/15 revealed mild diverticulosis of the sigmoid colon and descending colon.    Gallbladder ultrasound 3/26/15 did not reveal any evidence of cholelithiasis, cholecystitis, dilated bile ducts, etc.  HIDA scan with CCK 4/1/15 documented an ejection fraction of 92%.        Allergies & Meds:  Review of patient's allergies indicates:   Allergen Reactions    Lisinopril        Current Outpatient Prescriptions   Medication Sig Dispense Refill    albuterol-ipratropium 2.5mg-0.5mg/3mL (DUO-NEB) 0.5 mg-3 mg(2.5 mg base)/3 mL nebulizer solution USE 1 VIAL VIA NEBULIZER FOUR TIMES DAILY 360 mL 9    ANORO ELLIPTA 62.5-25 mcg/actuation DsDv INHALE 1 PUFF INTO THE LUNGS DAILY 60 each 1    famotidine (PEPCID) 40 MG tablet Take 40 mg by mouth once daily.      gabapentin (NEURONTIN) 800 MG tablet Take 1 tablet (800 mg total) by mouth 3 (three) times daily. 90 tablet 2    metoprolol tartrate (LOPRESSOR) 50 MG tablet TAKE 1 TABLET BY MOUTH TWICE DAILY 180 tablet 0    montelukast (SINGULAIR) 10 mg tablet TAKE 1 TABLET BY  MOUTH EVERY DAY 90 tablet 1    pantoprazole (PROTONIX) 40 MG tablet TAKE 1 TABLET(40 MG) BY MOUTH EVERY DAY 90 tablet 11    promethazine (PHENERGAN) 25 MG tablet TAKE 1 TABLET(25 MG) BY MOUTH EVERY 8 HOURS AS NEEDED FOR NAUSEA 30 tablet 0    acetaminophen-codeine 300-30mg (TYLENOL #3) 300-30 mg Tab Take 1 tablet by mouth 2 (two) times daily as needed.  0    albuterol 90 mcg/actuation inhaler Inhale 2 puffs into the lungs every 6 (six) hours as needed for Wheezing. 18 g 11    baclofen (LIORESAL) 10 MG tablet Take 10 mg by mouth 2 (two) times daily.      sodium,potassium,mag sulfates (SUPREP BOWEL PREP KIT) 17.5-3.13-1.6 gram SolR Follow written instructions provided by Clinic 2 Bottle 0     No current facility-administered medications for this visit.        PMFSHx:  Past Medical History:   Diagnosis Date    Anxiety     Arthritis     osteoarthritis    Chronic pain     Chronic pain     COPD (chronic obstructive pulmonary disease)     GERD (gastroesophageal reflux disease)     History of stomach ulcers     Hypertension     IBS (irritable bowel syndrome)     [facility] Diagnosed 2004    Palpitations        Past Surgical History:   Procedure Laterality Date    APPENDECTOMY       SECTION      COLONOSCOPY      ? [place] in [place]    COLONOSCOPY  2015    Dr. Farfan   diverticulosis    CYSTOSCOPY      HERNIA REPAIR      HYSTERECTOMY  9/3/2014    with bso    TOTAL ABDOMINAL HYSTERECTOMY W/ BILATERAL SALPINGOOPHORECTOMY Bilateral        Family History   Problem Relation Age of Onset    Hypertension Mother     Diabetes Sister     Colon cancer Maternal Grandmother        Social History   Substance Use Topics    Smoking status: Current Every Day Smoker     Packs/day: 0.50     Types: Cigarettes    Smokeless tobacco: Never Used    Alcohol use No       Review of Systems   Constitutional: Negative for appetite change, chills, fatigue, fever and unexpected weight change.    HENT: Negative for congestion, dental problem, ear pain, mouth sores, postnasal drip, rhinorrhea, sore throat, tinnitus, trouble swallowing and voice change.    Eyes: Negative for photophobia, pain, discharge and visual disturbance.   Respiratory: Negative for cough, chest tightness, shortness of breath and wheezing.    Cardiovascular: Negative for chest pain, palpitations and leg swelling.   Gastrointestinal: Negative for abdominal pain, blood in stool, constipation and vomiting.   Endocrine: Negative for cold intolerance, heat intolerance, polydipsia, polyphagia and polyuria.   Genitourinary: Negative for difficulty urinating, dysuria, flank pain, frequency, hematuria and urgency.   Musculoskeletal: Negative for arthralgias, joint swelling and myalgias.   Skin: Negative for color change and rash.   Allergic/Immunologic: Negative for immunocompromised state.   Neurological: Negative for dizziness, tremors, seizures, syncope, speech difficulty, weakness, numbness and headaches.   Hematological: Negative for adenopathy. Does not bruise/bleed easily.   Psychiatric/Behavioral: Negative for agitation, confusion, hallucinations, self-injury and suicidal ideas. The patient is not nervous/anxious.        Objective:      Physical Exam   Constitutional: She is oriented to person, place, and time. She appears well-developed and well-nourished. She is active.  Non-toxic appearance. No distress.   HENT:   Head: Normocephalic and atraumatic.   Right Ear: Hearing and external ear normal. No drainage or tenderness.   Left Ear: Hearing and external ear normal. No drainage or tenderness.   Nose: Nose normal. No rhinorrhea. No epistaxis.   Mouth/Throat: Uvula is midline, oropharynx is clear and moist and mucous membranes are normal. Mucous membranes are not pale, not dry and not cyanotic. No oropharyngeal exudate.   Eyes: Conjunctivae and lids are normal. Pupils are equal, round, and reactive to light. Right eye exhibits no  discharge and no exudate. Left eye exhibits no discharge and no exudate. Right conjunctiva is not injected. Right eye exhibits no nystagmus. Left eye exhibits no nystagmus.   Neck: Trachea normal, full passive range of motion without pain and phonation normal. No JVD present. Carotid bruit is not present. No tracheal deviation present. No thyroid mass and no thyromegaly present.   Cardiovascular: Normal rate, regular rhythm, S1 normal, S2 normal, normal heart sounds and intact distal pulses.  PMI is not displaced.  Exam reveals no gallop and no friction rub.    No murmur heard.  Pulmonary/Chest: Effort normal and breath sounds normal. No accessory muscle usage. No respiratory distress. She exhibits no mass, no tenderness and no crepitus. Right breast exhibits no inverted nipple, no mass, no nipple discharge, no skin change and no tenderness. Left breast exhibits no inverted nipple, no mass, no nipple discharge, no skin change and no tenderness. Breasts are symmetrical.   Abdominal: Soft. Normal appearance and bowel sounds are normal. She exhibits no distension, no fluid wave, no abdominal bruit and no mass. There is no hepatosplenomegaly. There is no tenderness. There is no rebound, no guarding and negative Ch's sign. No hernia. Hernia confirmed negative in the right inguinal area and confirmed negative in the left inguinal area.   Genitourinary: Vagina normal. Rectal exam shows guaiac positive stool. There is no rash or lesion on the right labia. There is no rash or lesion on the left labia. Right adnexum displays no mass. Left adnexum displays no mass. No vaginal discharge found.   Musculoskeletal:        Cervical back: Normal.        Thoracic back: Normal.        Lumbar back: Normal.        Right upper arm: Normal.        Left upper arm: Normal.        Right forearm: Normal.        Left forearm: Normal.        Right hand: Normal.        Left hand: Normal.        Right upper leg: Normal.        Left upper leg:  Normal.        Right lower leg: Normal.        Left lower leg: Normal.        Right foot: Normal.        Left foot: Normal.   Lymphadenopathy:        Head (right side): No submental, no submandibular and no posterior auricular adenopathy present.        Head (left side): No submental, no submandibular and no posterior auricular adenopathy present.     She has no cervical adenopathy.     She has no axillary adenopathy.        Right: No inguinal and no supraclavicular adenopathy present.        Left: No inguinal and no supraclavicular adenopathy present.   Neurological: She is alert and oriented to person, place, and time. She has normal strength. No cranial nerve deficit or sensory deficit. Coordination and gait normal. GCS eye subscore is 4. GCS verbal subscore is 5. GCS motor subscore is 6.   Skin: Skin is warm, dry and intact. No rash noted. No cyanosis. Nails show no clubbing.   Psychiatric: She has a normal mood and affect. Her speech is normal. Judgment and thought content normal. Her mood appears not anxious. Her affect is not inappropriate. She is not actively hallucinating. She does not exhibit a depressed mood.         Test Results:  Lab results were reviewed from 4/24/18.  CBC showed no evidence of leukocytosis, anemia, or thrombocytopenia.  Red cell indices and white cell differential pattern unremarkable.  Electrolytes were all in the range of normal.  BUN and creatinine showed no evidence of renal dysfunction.  Glucose showed no suspicion of diabetes.  Liver profile showed no evidence of hepatocellular disease or biliary obstruction.    EGD, colonoscopy, and pathology reports were reviewed from 2015 with the above findings confirmed.  Gallbladder ultrasound and HIDA scan films and report reviewed from 2015 with the above findings confirmed.    Assessment:      Hemoccult positive stool.  Chronic diarrhea.  Chronic postprandial nausea.  Elevated gallbladder ejection fraction on HIDA scan.  History of  ulcer disease.  History of Yousif's esophagus.  Differential diagnosis includes but is not limited to gastrointestinal malignancies, ulcer disease, chronic cholecystitis, inflammatory bowel disease, diverticulosis, etc.  Endoscopy warranted.  Options at this time include but are not limited to endoscopy, second opinion, cholecystectomy, additional radiologic studies, etc.  1. Heme positive stool    2. Chronic diarrhea    3. Yousif's esophagus without dysplasia    4. Chronic obstructive pulmonary disease, unspecified COPD type        Plan:   Heme positive stool  -     Case Request Operating Room: COLONOSCOPY, ESOPHAGOGASTRODUODENOSCOPY (EGD)  -     Place in Outpatient; Standing  -     Vital signs; Standing  -     Insert peripheral IV; Standing  -     Verify beta-blocker dose taken within 24 hours if patient is prescribed beta-blocker; Standing  -     Height and weight; Standing  -     Verify discontinuation of antithrombotics; Standing  -     Verify blood consent; Standing  -     Verify consent; Standing  -     Void on call to Operating Room; Standing  -     Diet NPO; Standing  -     Early ambulation; Standing  -     Pulse Oximetry Q4H; Standing  -     EKG 12-lead; Future    Chronic diarrhea    Yousif's esophagus without dysplasia    Chronic obstructive pulmonary disease, unspecified COPD type    Other orders  -     sodium,potassium,mag sulfates (SUPREP BOWEL PREP KIT) 17.5-3.13-1.6 gram SolR; Follow written instructions provided by Clinic  Dispense: 2 Bottle; Refill: 0  -     lidocaine (PF) 10 mg/ml (1%) injection 10 mg; Inject 1 mL (10 mg total) into the skin once.  -     lactated ringers infusion; Inject into the vein continuous.  -     IP VTE LOW RISK PATIENT; Standing        Follow-up in about 1 month (around 5/30/2018) for routine post-endosocpy visit.    Counseling/Medical Decision Making:    Janina Klein was counseled regarding the results of the evaluation thus far and the differential diagnosis.   Diagnostic and therapeutic options were discussed in detail along with the risks, benefits, possible complications, details of, and indications for each option.  Diagnoses discussed included but were not limited to: GB disease, GERD, hiatal hernia, PUD, gastritis, duodenitis, Sphincter of Oddi dysfunction, hemorrhoids, diverticulosis, cancer, IBD, IBS, benign tumors, angiodysplasias, ischemic disease.  Options discussed included but were not limited to: EGD, colonoscopy, proctoscopy, anoscopy, sigmoidoscopy, radiologic studies, PillCam, empiric therapy, second opinion, etc. Possible complications of endoscopy discussed included but were not limited to: bleeding, infections, endocarditis, injury to internal organs, incomplete exam, failure to diagnose cancer or other serious condition, need for emergency surgery, need for a temporary colostomy, need for additional operations or procedures, etc.  Entire conversation was held in layman's terms.  All unfamiliar terms were defined.  Questions solicited and answered.  I read the consent form to her verbatim.  Krames booklets were provided on EGD, GERD, GB disease / surgery, colonoscopy, polyps, diverticulosis, hemorrhoids, cancer, etc.  A copy of the consent form was provided for her review outside the office / hospital prior to the procedures.  At the conclusion of the conversation she voiced complete understanding of all we discussed and satisfaction that all of her questions had been answered to her full understanding.  She stated that she felt fully informed and completely capable of making an informed decision.  She requests and desires to proceed with EGD and colonoscopy.    Total face-to-face encounter time was 60 minutes, 40 minutes spent counseling as outlined/summarized above.

## 2018-04-30 NOTE — H&P
[facility] - General Surgery  General Surgery  H&P      Patient Name: Janina Klein  MRN: [id number]        Chief Complaint: Consult and Gall Bladder Problem      HPI:  Ms. Klein presents today as a consult from Lisa Cooksey NP-C.  Ms. Klein has complaints of chronic postprandial nausea and diarrhea.  Previous evaluation several years ago included an EGD, colonoscopy, ultrasound, and HIDA scan.  No abdominal pain.  No vomiting.  No fever, chills, night sweats, weight loss, constipation, melena, hematochezia, hematemesis, jaundice, biliuria, acholia, etc.  No other associated symptoms.  No specific food intolerances.  No other aggravating factors.  No alleviating factors other than over-the-counter Imodium A-D.    EGD 6/16/15 confirmed a healing gastric ulcer EGD 4/17/15 revealed two deep cratered pyloric channel ulcers.  Biopsies confirmed Yousif's esophagus with no dysplasia and healing gastric ulcers with no evidence of Helicobacter.  Colonoscopy 4/17/15 revealed mild diverticulosis of the sigmoid colon and descending colon.    Gallbladder ultrasound 3/26/15 did not reveal any evidence of cholelithiasis, cholecystitis, dilated bile ducts, etc.  HIDA scan with CCK 4/1/15 documented an ejection fraction of 92%.        Allergies & Meds:  Review of patient's allergies indicates:   Allergen Reactions    Lisinopril        Current Outpatient Prescriptions   Medication Sig Dispense Refill    albuterol-ipratropium 2.5mg-0.5mg/3mL (DUO-NEB) 0.5 mg-3 mg(2.5 mg base)/3 mL nebulizer solution USE 1 VIAL VIA NEBULIZER FOUR TIMES DAILY 360 mL 9    ANORO ELLIPTA 62.5-25 mcg/actuation DsDv INHALE 1 PUFF INTO THE LUNGS DAILY 60 each 1    famotidine (PEPCID) 40 MG tablet Take 40 mg by mouth once daily.      gabapentin (NEURONTIN) 800 MG tablet Take 1 tablet (800 mg total) by mouth 3 (three) times daily. 90 tablet 2    metoprolol tartrate (LOPRESSOR) 50 MG tablet TAKE 1 TABLET BY MOUTH TWICE DAILY 180 tablet 0     montelukast (SINGULAIR) 10 mg tablet TAKE 1 TABLET BY MOUTH EVERY DAY 90 tablet 1    pantoprazole (PROTONIX) 40 MG tablet TAKE 1 TABLET(40 MG) BY MOUTH EVERY DAY 90 tablet 11    promethazine (PHENERGAN) 25 MG tablet TAKE 1 TABLET(25 MG) BY MOUTH EVERY 8 HOURS AS NEEDED FOR NAUSEA 30 tablet 0    acetaminophen-codeine 300-30mg (TYLENOL #3) 300-30 mg Tab Take 1 tablet by mouth 2 (two) times daily as needed.  0    albuterol 90 mcg/actuation inhaler Inhale 2 puffs into the lungs every 6 (six) hours as needed for Wheezing. 18 g 11    baclofen (LIORESAL) 10 MG tablet Take 10 mg by mouth 2 (two) times daily.      sodium,potassium,mag sulfates (SUPREP BOWEL PREP KIT) 17.5-3.13-1.6 gram SolR Follow written instructions provided by Clinic 2 Bottle 0     No current facility-administered medications for this visit.        PMFSHx:  Past Medical History:   Diagnosis Date    Anxiety     Arthritis     osteoarthritis    Chronic pain     Chronic pain     COPD (chronic obstructive pulmonary disease)     GERD (gastroesophageal reflux disease)     History of stomach ulcers     Hypertension     IBS (irritable bowel syndrome)     [facility]    Palpitations        Past Surgical History:   Procedure Laterality Date    APPENDECTOMY       SECTION      [place] in [place]    COLONOSCOPY  2015    Dr. Farfan   diverticulosis    CYSTOSCOPY      HERNIA REPAIR      HYSTERECTOMY  9/3/2014    with bso    TOTAL ABDOMINAL HYSTERECTOMY W/ BILATERAL SALPINGOOPHORECTOMY Bilateral        Family History   Problem Relation Age of Onset    Hypertension Mother     Diabetes Sister     Colon cancer Maternal Grandmother        Social History   Substance Use Topics    Smoking status: Current Every Day Smoker     Packs/day: 0.50     Types: Cigarettes    Smokeless tobacco: Never Used    Alcohol use No       Review of Systems   Constitutional: Negative for appetite change,  chills, fatigue, fever and unexpected weight change.   HENT: Negative for congestion, dental problem, ear pain, mouth sores, postnasal drip, rhinorrhea, sore throat, tinnitus, trouble swallowing and voice change.    Eyes: Negative for photophobia, pain, discharge and visual disturbance.   Respiratory: Negative for cough, chest tightness, shortness of breath and wheezing.    Cardiovascular: Negative for chest pain, palpitations and leg swelling.   Gastrointestinal: Negative for abdominal pain, blood in stool, constipation and vomiting.   Endocrine: Negative for cold intolerance, heat intolerance, polydipsia, polyphagia and polyuria.   Genitourinary: Negative for difficulty urinating, dysuria, flank pain, frequency, hematuria and urgency.   Musculoskeletal: Negative for arthralgias, joint swelling and myalgias.   Skin: Negative for color change and rash.   Allergic/Immunologic: Negative for immunocompromised state.   Neurological: Negative for dizziness, tremors, seizures, syncope, speech difficulty, weakness, numbness and headaches.   Hematological: Negative for adenopathy. Does not bruise/bleed easily.   Psychiatric/Behavioral: Negative for agitation, confusion, hallucinations, self-injury and suicidal ideas. The patient is not nervous/anxious.        Objective:      Physical Exam   Constitutional: She is oriented to person, place, and time. She appears well-developed and well-nourished. She is active.  Non-toxic appearance. No distress.   HENT:   Head: Normocephalic and atraumatic.   Right Ear: Hearing and external ear normal. No drainage or tenderness.   Left Ear: Hearing and external ear normal. No drainage or tenderness.   Nose: Nose normal. No rhinorrhea. No epistaxis.   Mouth/Throat: Uvula is midline, oropharynx is clear and moist and mucous membranes are normal. Mucous membranes are not pale, not dry and not cyanotic. No oropharyngeal exudate.   Eyes: Conjunctivae and lids are normal. Pupils are equal, round,  and reactive to light. Right eye exhibits no discharge and no exudate. Left eye exhibits no discharge and no exudate. Right conjunctiva is not injected. Right eye exhibits no nystagmus. Left eye exhibits no nystagmus.   Neck: Trachea normal, full passive range of motion without pain and phonation normal. No JVD present. Carotid bruit is not present. No tracheal deviation present. No thyroid mass and no thyromegaly present.   Cardiovascular: Normal rate, regular rhythm, S1 normal, S2 normal, normal heart sounds and intact distal pulses.  PMI is not displaced.  Exam reveals no gallop and no friction rub.    No murmur heard.  Pulmonary/Chest: Effort normal and breath sounds normal. No accessory muscle usage. No respiratory distress. She exhibits no mass, no tenderness and no crepitus. Right breast exhibits no inverted nipple, no mass, no nipple discharge, no skin change and no tenderness. Left breast exhibits no inverted nipple, no mass, no nipple discharge, no skin change and no tenderness. Breasts are symmetrical.   Abdominal: Soft. Normal appearance and bowel sounds are normal. She exhibits no distension, no fluid wave, no abdominal bruit and no mass. There is no hepatosplenomegaly. There is no tenderness. There is no rebound, no guarding and negative Ch's sign. No hernia. Hernia confirmed negative in the right inguinal area and confirmed negative in the left inguinal area.   Genitourinary: Vagina normal. Rectal exam shows guaiac positive stool. There is no rash or lesion on the right labia. There is no rash or lesion on the left labia. Right adnexum displays no mass. Left adnexum displays no mass. No vaginal discharge found.   Musculoskeletal:        Cervical back: Normal.        Thoracic back: Normal.        Lumbar back: Normal.        Right upper arm: Normal.        Left upper arm: Normal.        Right forearm: Normal.        Left forearm: Normal.        Right hand: Normal.        Left hand: Normal.         Right upper leg: Normal.        Left upper leg: Normal.        Right lower leg: Normal.        Left lower leg: Normal.        Right foot: Normal.        Left foot: Normal.   Lymphadenopathy:        Head (right side): No submental, no submandibular and no posterior auricular adenopathy present.        Head (left side): No submental, no submandibular and no posterior auricular adenopathy present.     She has no cervical adenopathy.     She has no axillary adenopathy.        Right: No inguinal and no supraclavicular adenopathy present.        Left: No inguinal and no supraclavicular adenopathy present.   Neurological: She is alert and oriented to person, place, and time. She has normal strength. No cranial nerve deficit or sensory deficit. Coordination and gait normal. GCS eye subscore is 4. GCS verbal subscore is 5. GCS motor subscore is 6.   Skin: Skin is warm, dry and intact. No rash noted. No cyanosis. Nails show no clubbing.   Psychiatric: She has a normal mood and affect. Her speech is normal. Judgment and thought content normal. Her mood appears not anxious. Her affect is not inappropriate. She is not actively hallucinating. She does not exhibit a depressed mood.         Test Results:  Lab results were reviewed from 4/24/18.  CBC showed no evidence of leukocytosis, anemia, or thrombocytopenia.  Red cell indices and white cell differential pattern unremarkable.  Electrolytes were all in the range of normal.  BUN and creatinine showed no evidence of renal dysfunction.  Glucose showed no suspicion of diabetes.  Liver profile showed no evidence of hepatocellular disease or biliary obstruction.    EGD, colonoscopy, and pathology reports were reviewed from 2015 with the above findings confirmed.  Gallbladder ultrasound and HIDA scan films and report reviewed from 2015 with the above findings confirmed.    Assessment:      Hemoccult positive stool.  Chronic diarrhea.  Chronic postprandial nausea.  Elevated gallbladder  ejection fraction on HIDA scan.  History of ulcer disease.  History of Yousif's esophagus.  Differential diagnosis includes but is not limited to gastrointestinal malignancies, ulcer disease, chronic cholecystitis, inflammatory bowel disease, diverticulosis, etc.  Endoscopy warranted.  Options at this time include but are not limited to endoscopy, second opinion, cholecystectomy, additional radiologic studies, etc.  1. Heme positive stool    2. Chronic diarrhea    3. Yousif's esophagus without dysplasia    4. Chronic obstructive pulmonary disease, unspecified COPD type        Plan:   Heme positive stool  -     Case Request Operating Room: COLONOSCOPY, ESOPHAGOGASTRODUODENOSCOPY (EGD)  -     Place in Outpatient; Standing  -     Vital signs; Standing  -     Insert peripheral IV; Standing  -     Verify beta-blocker dose taken within 24 hours if patient is prescribed beta-blocker; Standing  -     Height and weight; Standing  -     Verify discontinuation of antithrombotics; Standing  -     Verify blood consent; Standing  -     Verify consent; Standing  -     Void on call to Operating Room; Standing  -     Diet NPO; Standing  -     Early ambulation; Standing  -     Pulse Oximetry Q4H; Standing  -     EKG 12-lead; Future    Chronic diarrhea    Yousif's esophagus without dysplasia    Chronic obstructive pulmonary disease, unspecified COPD type    Other orders  -     sodium,potassium,mag sulfates (SUPREP BOWEL PREP KIT) 17.5-3.13-1.6 gram SolR; Follow written instructions provided by Clinic  Dispense: 2 Bottle; Refill: 0  -     lidocaine (PF) 10 mg/ml (1%) injection 10 mg; Inject 1 mL (10 mg total) into the skin once.  -     lactated ringers infusion; Inject into the vein continuous.  -     IP VTE LOW RISK PATIENT; Standing        Follow-up in about 1 month (around 5/30/2018) for routine post-endosocpy visit.    Counseling/Medical Decision Making:    Janina Klein was counseled regarding the results of the evaluation thus  far and the differential diagnosis.  Diagnostic and therapeutic options were discussed in detail along with the risks, benefits, possible complications, details of, and indications for each option.  Diagnoses discussed included but were not limited to: GB disease, GERD, hiatal hernia, PUD, gastritis, duodenitis, Sphincter of Oddi dysfunction, hemorrhoids, diverticulosis, cancer, IBD, IBS, benign tumors, angiodysplasias, ischemic disease.  Options discussed included but were not limited to: EGD, colonoscopy, proctoscopy, anoscopy, sigmoidoscopy, radiologic studies, PillCam, empiric therapy, second opinion, etc. Possible complications of endoscopy discussed included but were not limited to: bleeding, infections, endocarditis, injury to internal organs, incomplete exam, failure to diagnose cancer or other serious condition, need for emergency surgery, need for a temporary colostomy, need for additional operations or procedures, etc.  Entire conversation was held in layman's terms.  All unfamiliar terms were defined.  Questions solicited and answered.  I read the consent form to her verbatim.  Krames booklets were provided on EGD, GERD, GB disease / surgery, colonoscopy, polyps, diverticulosis, hemorrhoids, cancer, etc.  A copy of the consent form was provided for her review outside the office / hospital prior to the procedures.  At the conclusion of the conversation she voiced complete understanding of all we discussed and satisfaction that all of her questions had been answered to her full understanding.  She stated that she felt fully informed and completely capable of making an informed decision.  She requests and desires to proceed with EGD and colonoscopy.    Total face-to-face encounter time was 60 minutes, 40 minutes spent counseling as outlined/summarized above.

## 2018-04-30 NOTE — LETTER
April 30, 2018      Lisa Y. Cooksey, AYESHA  [address]  [place]  [place] [address]           [facility] - General Surgery  [address] [facility] [place] [address]  Phone: [phone]  Fax: [phone]          Patient: Janina Klein   MR Number: [id number]   YOB: 1960   Date of Visit: 4/30/2018       Dear Lisa Y. Cooksey:    Thank you for referring Janina Klein to me for evaluation. Attached you will find relevant portions of my assessment and plan of care.    If you have questions, please do not hesitate to call me. I look forward to following Janina Klein along with you.    Sincerely,    Casey Farfan MD    Enclosure  CC:  No Recipients    If you would like to receive this communication electronically, please contact [email or web address] or [phone] to request more information on [employer] Link access.    For providers and/or their staff who would like to refer a patient to [facility], please contact us through our one-stop-shop provider referral line, [facility], at [phone].    If you feel you have received this communication in error or would no longer like to receive these types of communications, please e-mail [email or web address]

## 2018-05-07 ENCOUNTER — TELEPHONE (OUTPATIENT)
Dept: SURGERY | Facility: CLINIC | Age: 58
End: 2018-05-07

## 2018-05-07 NOTE — TELEPHONE ENCOUNTER
----- Message from Augusta Gonzalez sent at 5/7/2018 10:31 AM CDT -----  Type:  Test Results    Who Called: ;pt  Name of Test (Lab/Mammo/Etc): ekg  Date of Test: na  Ordering Provider:  [facility]  Where the test was performed:[facility]  Best Call Back Number:  [phone]  Additional Information:   Calling  To see if EKG  Results were  receive  for Dr christos melo

## 2018-11-12 ENCOUNTER — TELEPHONE (OUTPATIENT)
Dept: SURGERY | Facility: CLINIC | Age: 58
End: 2018-11-12

## 2018-11-14 ENCOUNTER — TELEPHONE (OUTPATIENT)
Dept: SURGERY | Facility: CLINIC | Age: 58
End: 2018-11-14

## 2019-05-16 ENCOUNTER — HOSPITAL ENCOUNTER (OUTPATIENT)
Dept: RADIOLOGY | Facility: HOSPITAL | Age: 59
Discharge: HOME | End: 2019-05-16
Attending: NURSE PRACTITIONER
Payer: MEDICARE

## 2019-05-16 DIAGNOSIS — M54.6 ACUTE BILATERAL THORACIC BACK PAIN: ICD-10-CM
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
## 2019-05-16 DIAGNOSIS — Z12.31 ENCOUNTER FOR SCREENING MAMMOGRAM FOR BREAST CANCER: ICD-10-CM

## 2019-05-16 DIAGNOSIS — M54.50 LOW BACK PAIN, NON-SPECIFIC: ICD-10-CM

## 2019-05-16 DIAGNOSIS — M53.3 PAIN IN THE COCCYX: ICD-10-CM

## 2019-05-16 PROCEDURE — 77067 SCR MAMMO BI INCL CAD: CPT | Mod: 26,,, | Performed by: RADIOLOGY

## 2019-05-16 PROCEDURE — 77063 MAMMO DIGITAL SCREENING BILAT WITH TOMOSYNTHESIS_CAD: ICD-10-PCS | Mod: 26,,, | Performed by: RADIOLOGY

## 2019-05-16 PROCEDURE — 77067 SCR MAMMO BI INCL CAD: CPT | Mod: TC

## 2019-05-16 PROCEDURE — 77063 BREAST TOMOSYNTHESIS BI: CPT | Mod: 26,,, | Performed by: RADIOLOGY

## 2019-05-16 PROCEDURE — 77067 MAMMO DIGITAL SCREENING BILAT WITH TOMOSYNTHESIS_CAD: ICD-10-PCS | Mod: 26,,, | Performed by: RADIOLOGY

## 2019-10-29 ENCOUNTER — HOSPITAL ENCOUNTER (EMERGENCY)
Facility: HOSPITAL | Age: 59
Discharge: HOME | End: 2019-10-29
Attending: INTERNAL MEDICINE
Payer: MEDICARE

## 2019-10-29 VITALS
WEIGHT: 143 LBS | TEMPERATURE: 98 F | HEIGHT: 64 IN | DIASTOLIC BLOOD PRESSURE: 94 MMHG | OXYGEN SATURATION: 93 % | SYSTOLIC BLOOD PRESSURE: 126 MMHG | RESPIRATION RATE: 17 BRPM | HEART RATE: 75 BPM | BODY MASS INDEX: 24.41 KG/M2

## 2019-10-29 DIAGNOSIS — J44.1 COPD EXACERBATION: Primary | ICD-10-CM

## 2019-10-29 DIAGNOSIS — R06.02 SHORTNESS OF BREATH: ICD-10-CM

## 2019-10-29 PROCEDURE — 99284 EMERGENCY DEPT VISIT MOD MDM: CPT | Mod: 25

## 2019-10-29 PROCEDURE — 94761 N-INVAS EAR/PLS OXIMETRY MLT: CPT

## 2019-10-29 PROCEDURE — 71045 X-RAY EXAM CHEST 1 VIEW: CPT | Mod: TC,FY

## 2019-10-29 PROCEDURE — 93005 ELECTROCARDIOGRAM TRACING: CPT

## 2019-10-29 PROCEDURE — 96374 THER/PROPH/DIAG INJ IV PUSH: CPT

## 2019-10-29 PROCEDURE — 93010 ELECTROCARDIOGRAM REPORT: CPT | Mod: ,,, | Performed by: INTERNAL MEDICINE

## 2019-10-29 PROCEDURE — 94640 AIRWAY INHALATION TREATMENT: CPT

## 2019-10-29 PROCEDURE — 25000242 PHARM REV CODE 250 ALT 637 W/ HCPCS: Performed by: INTERNAL MEDICINE

## 2019-10-29 PROCEDURE — 71045 XR CHEST AP PORTABLE: ICD-10-PCS | Mod: 26,,, | Performed by: RADIOLOGY

## 2019-10-29 PROCEDURE — 71045 X-RAY EXAM CHEST 1 VIEW: CPT | Mod: 26,,, | Performed by: RADIOLOGY

## 2019-10-29 PROCEDURE — 63600175 PHARM REV CODE 636 W HCPCS: Performed by: INTERNAL MEDICINE

## 2019-10-29 PROCEDURE — 93010 EKG 12-LEAD: ICD-10-PCS | Mod: ,,, | Performed by: INTERNAL MEDICINE

## 2019-10-29 RX ORDER — IPRATROPIUM BROMIDE AND ALBUTEROL SULFATE 2.5; .5 MG/3ML; MG/3ML
3 SOLUTION RESPIRATORY (INHALATION)
Status: COMPLETED | OUTPATIENT
Start: 2019-10-29 | End: 2019-10-29

## 2019-10-29 RX ORDER — IBUPROFEN 200 MG
1 TABLET ORAL DAILY
Refills: 11 | COMMUNITY
Start: 2019-10-29 | End: 2019-11-07

## 2019-10-29 RX ORDER — DEXAMETHASONE SODIUM PHOSPHATE 100 MG/10ML
10 INJECTION INTRAMUSCULAR; INTRAVENOUS
Status: COMPLETED | OUTPATIENT
Start: 2019-10-29 | End: 2019-10-29

## 2019-10-29 RX ORDER — METHYLPREDNISOLONE 4 MG/1
TABLET ORAL
Qty: 1 PACKAGE | Refills: 0 | Status: SHIPPED | OUTPATIENT
Start: 2019-10-29 | End: 2019-11-07

## 2019-10-29 RX ADMIN — IPRATROPIUM BROMIDE AND ALBUTEROL SULFATE 3 ML: .5; 3 SOLUTION RESPIRATORY (INHALATION) at 03:10

## 2019-10-29 RX ADMIN — DEXAMETHASONE SODIUM PHOSPHATE 10 MG: 10 INJECTION INTRAMUSCULAR; INTRAVENOUS at 03:10

## 2019-10-29 NOTE — ED TRIAGE NOTES
3 wks ago pulled muscle in back.  Next thing you know I feel it in my side.  Now I feel sharp pain all the time.

## 2019-10-29 NOTE — ED PROVIDER NOTES
Encounter Date: 10/29/2019       History     Chief Complaint   Patient presents with    Shortness of Breath     speaks in full sentences without difficulty.     Chest Pain     from coughing, has COPD     Patient comes in with shortness of breath and coughing.  She coming also complained of chest tightness.  She has a history of COPD.  She continues to smoke.  She is not producing any sputum no fever.  She was concerned it could be are heart wanted checked out.        Review of patient's allergies indicates:   Allergen Reactions    Lisinopril      Past Medical History:   Diagnosis Date    Anxiety     Arthritis     osteoarthritis    Chronic pain     Chronic pain     COPD (chronic obstructive pulmonary disease)     GERD (gastroesophageal reflux disease)     History of stomach ulcers     Hypertension     IBS (irritable bowel syndrome)     [facility] 2004    Palpitations      Past Surgical History:   Procedure Laterality Date    APPENDECTOMY       SECTION      [place] in [place]    COLONOSCOPY  2015    Dr. Farfan   diverticulosis    CYSTOSCOPY      HERNIA REPAIR      HYSTERECTOMY  9/3/2014    with bso    TOTAL ABDOMINAL HYSTERECTOMY W/ BILATERAL SALPINGOOPHORECTOMY Bilateral      Family History   Problem Relation Age of Onset    Hypertension Mother     Diabetes Sister     Colon cancer Maternal Grandmother     Breast cancer Other      Social History     Tobacco Use    Smoking status: Current Every Day Smoker     Packs/day: 0.50     Types: Cigarettes    Smokeless tobacco: Never Used   Substance Use Topics    Alcohol use: No    Drug use: No     Review of Systems   Constitutional: Negative for fever.   HENT: Negative for sore throat.    Respiratory: Positive for chest tightness, shortness of breath and wheezing.    Cardiovascular: Negative for chest pain.   Gastrointestinal: Negative for nausea.   Genitourinary: Negative for dysuria.    Musculoskeletal: Negative for back pain.   Skin: Negative for rash.   Neurological: Negative for weakness.   Hematological: Does not bruise/bleed easily.   All other systems reviewed and are negative.      Physical Exam     Initial Vitals [10/29/19 1411]   BP Pulse Resp Temp SpO2   (!) 143/104 80 20 98.4 °F (36.9 °C) 99 %      MAP       --         Physical Exam    Nursing note and vitals reviewed.  Constitutional: Vital signs are normal. She appears well-developed and well-nourished. She is active and cooperative.   HENT:   Head: Normocephalic and atraumatic.   Eyes: Conjunctivae and lids are normal. Lids are everted and swept, no foreign bodies found.   Neck: Trachea normal, normal range of motion and full passive range of motion without pain. Neck supple.   Cardiovascular: Normal rate, regular rhythm, S1 normal, S2 normal, normal heart sounds, intact distal pulses and normal pulses.  No extrasystoles are present.    Pulmonary/Chest: She has wheezes.   Abdominal: Soft. Normal appearance and bowel sounds are normal.   Musculoskeletal: Normal range of motion.   Neurological: She is alert. She has normal reflexes. GCS eye subscore is 4. GCS verbal subscore is 5. GCS motor subscore is 6.   Skin: Skin is warm, dry and intact. Capillary refill takes less than 2 seconds.   Psychiatric: She has a normal mood and affect. Her speech is normal and behavior is normal. Cognition and memory are normal.         ED Course   Procedures  Labs Reviewed - No data to display  EKG Readings: (Independently Interpreted)   Initial Reading: No STEMI. Rhythm: Normal Sinus Rhythm. Ectopy: No Ectopy. Conduction: Normal. ST Segments: Normal ST Segments. T Waves: Normal. Axis: Normal. Clinical Impression: Normal Sinus Rhythm       Imaging Results          X-Ray Chest AP Portable (In process)               X-Rays:   Independently Interpreted Readings:   Chest X-Ray: Normal heart size.  No infiltrates.  No acute abnormalities. Normal vessels.      Medical Decision Making:   Clinical Tests:   Lab Tests: Reviewed       <> Summary of Lab: Patient had multiple recent lab studies.  They were not report performed.  There were all reviewed.  Overall there were unremarkable and noncontributory.  Radiological Study: Ordered and Reviewed  Medical Tests: Ordered and Reviewed  ED Management:  Chest x-ray shows flattening of the diaphragms compatible with COPD.  No clear-cut emphysema.  She EKG was normal.    Patient was given IV Decadron, DuoNeb, 60 mg of prednisone orally.  Medications reviewed she had no medications that appear to be contributory except for metoprolol.  I suggested she discuss this with her primary care physician to see if there is a beta-blocker that might have less effect on airway constriction.  It was not changed.    Discharged with a Medrol Dosepak    She was markedly improved and asymptomatic on discharge                      Clinical Impression:       ICD-10-CM ICD-9-CM   1. COPD exacerbation J44.1 491.21   2. Shortness of breath R06.02 786.05         Disposition:   Disposition: Discharged  Condition: Stable                        Servando Rios MD  10/29/19 155

## 2019-10-31 PROBLEM — E78.5 DYSLIPIDEMIA (HIGH LDL; LOW HDL): Status: ACTIVE | Noted: 2019-10-31

## 2019-11-01 ENCOUNTER — HOSPITAL ENCOUNTER (OUTPATIENT)
Dept: RADIOLOGY | Facility: HOSPITAL | Age: 59
Discharge: HOME | End: 2019-11-01
Attending: NURSE PRACTITIONER
Payer: MEDICARE

## 2019-11-01 DIAGNOSIS — R91.1 RIGHT UPPER LOBE PULMONARY NODULE: ICD-10-CM

## 2019-11-01 PROCEDURE — 71046 X-RAY EXAM CHEST 2 VIEWS: CPT | Mod: 26,,, | Performed by: RADIOLOGY

## 2019-11-01 PROCEDURE — 71046 X-RAY EXAM CHEST 2 VIEWS: CPT | Mod: TC,FY

## 2019-11-01 PROCEDURE — 71046 XR CHEST PA AND LATERAL: ICD-10-PCS | Mod: 26,,, | Performed by: RADIOLOGY

## 2019-11-14 ENCOUNTER — TELEPHONE (OUTPATIENT)
Dept: SURGERY | Facility: CLINIC | Age: 59
End: 2019-11-14

## 2019-11-14 NOTE — TELEPHONE ENCOUNTER
----- Message from Bere Jones sent at 11/14/2019  2:44 PM CST -----  Contact: patient  Type: Needs Medical Advice    Who Called:  patient  Symptoms (please be specific):  lor  How long has patient had these symptoms:  lor  Pharmacy name and phone #:  [facility]  Best Call Back Number: [phone]  Additional Information: patient states she would like to schedule colonoscopy.  Please call to advise and schedule.,Thanks!

## 2019-11-14 NOTE — TELEPHONE ENCOUNTER
Phoned pt. No answer. Voicemail disabled. Unable to leave a message.  Pt's last colonoscopy 04/17/2015, and repeat screening colonoscopy was recommended in 10 years, sooner if clinically indicated.  This would make her due in 2025 per Dr. Farfan's last office visit note.

## 2019-12-19 ENCOUNTER — OFFICE VISIT (OUTPATIENT)
Dept: SURGERY | Facility: CLINIC | Age: 59
End: 2019-12-19
Payer: MEDICARE

## 2019-12-19 VITALS
TEMPERATURE: 99 F | OXYGEN SATURATION: 98 % | DIASTOLIC BLOOD PRESSURE: 104 MMHG | BODY MASS INDEX: 24.99 KG/M2 | SYSTOLIC BLOOD PRESSURE: 153 MMHG | HEIGHT: 64 IN | RESPIRATION RATE: 14 BRPM | HEART RATE: 80 BPM | WEIGHT: 146.38 LBS

## 2019-12-19 DIAGNOSIS — I10 ESSENTIAL HYPERTENSION: ICD-10-CM

## 2019-12-19 DIAGNOSIS — K22.70 BARRETT'S ESOPHAGUS WITHOUT DYSPLASIA: ICD-10-CM

## 2019-12-19 DIAGNOSIS — J44.9 CHRONIC OBSTRUCTIVE PULMONARY DISEASE, UNSPECIFIED COPD TYPE: ICD-10-CM

## 2019-12-19 DIAGNOSIS — K21.9 GASTROESOPHAGEAL REFLUX DISEASE, ESOPHAGITIS PRESENCE NOT SPECIFIED: ICD-10-CM

## 2019-12-19 DIAGNOSIS — R63.4 WEIGHT LOSS: ICD-10-CM

## 2019-12-19 DIAGNOSIS — R13.10 DYSPHAGIA, UNSPECIFIED TYPE: ICD-10-CM

## 2019-12-19 DIAGNOSIS — R10.11 RIGHT UPPER QUADRANT ABDOMINAL PAIN: Primary | ICD-10-CM

## 2019-12-19 DIAGNOSIS — Z87.11 HISTORY OF STOMACH ULCERS: ICD-10-CM

## 2019-12-19 PROCEDURE — 99215 OFFICE O/P EST HI 40 MIN: CPT | Mod: S$GLB,,, | Performed by: SURGERY

## 2019-12-19 PROCEDURE — 3077F PR MOST RECENT SYSTOLIC BLOOD PRESSURE >= 140 MM HG: ICD-10-PCS | Mod: CPTII,S$GLB,, | Performed by: SURGERY

## 2019-12-19 PROCEDURE — 3008F PR BODY MASS INDEX (BMI) DOCUMENTED: ICD-10-PCS | Mod: CPTII,S$GLB,, | Performed by: SURGERY

## 2019-12-19 PROCEDURE — 3080F DIAST BP >= 90 MM HG: CPT | Mod: CPTII,S$GLB,, | Performed by: SURGERY

## 2019-12-19 PROCEDURE — 3077F SYST BP >= 140 MM HG: CPT | Mod: CPTII,S$GLB,, | Performed by: SURGERY

## 2019-12-19 PROCEDURE — 3080F PR MOST RECENT DIASTOLIC BLOOD PRESSURE >= 90 MM HG: ICD-10-PCS | Mod: CPTII,S$GLB,, | Performed by: SURGERY

## 2019-12-19 PROCEDURE — 3008F BODY MASS INDEX DOCD: CPT | Mod: CPTII,S$GLB,, | Performed by: SURGERY

## 2019-12-19 PROCEDURE — 99215 PR OFFICE/OUTPT VISIT, EST, LEVL V, 40-54 MIN: ICD-10-PCS | Mod: S$GLB,,, | Performed by: SURGERY

## 2019-12-19 RX ORDER — HYDROCODONE BITARTRATE AND ACETAMINOPHEN 10; 325 MG/1; MG/1
1 TABLET ORAL EVERY 8 HOURS PRN
Qty: 30 TABLET | Refills: 0 | Status: SHIPPED | OUTPATIENT
Start: 2019-12-19 | End: 2019-12-29

## 2019-12-19 RX ORDER — LIDOCAINE HYDROCHLORIDE 10 MG/ML
1 INJECTION, SOLUTION EPIDURAL; INFILTRATION; INTRACAUDAL; PERINEURAL ONCE
Status: CANCELLED | OUTPATIENT
Start: 2019-12-19 | End: 2019-12-19

## 2019-12-19 RX ORDER — SODIUM CHLORIDE, SODIUM LACTATE, POTASSIUM CHLORIDE, CALCIUM CHLORIDE 600; 310; 30; 20 MG/100ML; MG/100ML; MG/100ML; MG/100ML
INJECTION, SOLUTION INTRAVENOUS CONTINUOUS
Status: CANCELLED | OUTPATIENT
Start: 2019-12-19

## 2019-12-19 NOTE — LETTER
December 19, 2019      Lisa Y. Cooksey, AYESHA  [address]  [place]  [place] [address]           [facility] - General [facility]  [address] [facility] [place] [address]  Phone: [phone]  Fax: [phone]          Patient: Janina Klein   MR Number: [id number]   YOB: 1960   Date of Visit: 12/19/2019       Dear Lisa Y. Cooksey:    Thank you for referring Janina Klein to me for evaluation. Attached you will find relevant portions of my assessment and plan of care.    If you have questions, please do not hesitate to call me. I look forward to following Janina Klein along with you.    Sincerely,    Casey Farfan MD    Enclosure  CC:  No Recipients    If you would like to receive this communication electronically, please contact [email or web address] or [phone] to request more information on [employer] Link access.    For providers and/or their staff who would like to refer a patient to [facility], please contact us through our one-stop-shop provider referral line, [facility], at [phone].    If you feel you have received this communication in error or would no longer like to receive these types of communications, please e-mail [email or web address]

## 2019-12-19 NOTE — PROGRESS NOTES
Subjective:       Patient ID: Janina Klein     Chief Complaint:  Consult (Referral- L Cooksey, NP- Gallbladder/ screening Colonoscopy)      HPI:  Ms. Klein presents today as a consult from Lisa Cooksey NP-C.  She was seen in clinic here about 20 months ago.  At that time she was experiencing postprandial nausea and diarrhea.  She was scheduled for an EGD and colonoscopy.  Procedures were not completed due to circumstances beyond her control. She returns today with postprandial nausea and occasional vomiting, unexplained weight loss, anorexia, fatigue, dysphagia, right upper quadrant abdominal pain, and right shoulder pain. Pain is described as severe, sharp, and stabbing.  Pain originates in the right upper quadrant but radiates to the inferior tip of the right scapula.  She was recently seen in the emergency room at [facility] and told that she had an inflamed gallbladder.  Evaluation included an ultrasound and laboratory studies.  These records are not available currently.  She was discharged with a prescription for Cipro.  Ultram was also prescribed for pain, this is proven ineffective.  Pain has improved following treatment with Cipro but not completely resolved.       Allergies & Meds:  Review of patient's allergies indicates:   Allergen Reactions    Lisinopril        Current Outpatient Medications   Medication Sig Dispense Refill    albuterol-ipratropium 2.5mg-0.5mg/3mL (DUO-NEB) 0.5 mg-3 mg(2.5 mg base)/3 mL nebulizer solution USE 1 VIAL VIA NEBULIZER FOUR TIMES DAILY 360 mL 9    amLODIPine (NORVASC) 5 MG tablet Take 1 tablet (5 mg total) by mouth once daily. 90 tablet 3    atorvastatin (LIPITOR) 10 MG tablet Take 1 tablet (10 mg total) by mouth once daily. 90 tablet 0    fluticasone (FLONASE) 50 mcg/actuation nasal spray 2 sprays (100 mcg total) by Each Nare route once daily. 3 Bottle 3    gabapentin (NEURONTIN) 800 MG tablet TAKE 1 TABLET THREE TIMES DAILY 270 tablet 1    metoprolol  tartrate (LOPRESSOR) 50 MG tablet Take 1 tablet (50 mg total) by mouth 2 (two) times daily. 180 tablet 3    montelukast (SINGULAIR) 10 mg tablet TAKE 1 TABLET BY MOUTH EVERY DAY 90 tablet 1    pantoprazole (PROTONIX) 40 MG tablet TAKE 1 TABLET EVERY DAY 90 tablet 1    promethazine (PHENERGAN) 25 MG tablet Take 1 tablet (25 mg total) by mouth every 6 (six) hours as needed for Nausea. 30 tablet 0    umeclidinium-vilanterol (ANORO ELLIPTA) 62.5-25 mcg/actuation DsDv Inhale 1 puff into the lungs Daily. 60 each 5    albuterol 90 mcg/actuation inhaler Inhale 2 puffs into the lungs every 6 (six) hours as needed for Wheezing. 18 g 11    HYDROcodone-acetaminophen (NORCO)  mg per tablet Take 1 tablet by mouth every 8 (eight) hours as needed for Pain. 30 tablet 0    methylPREDNISolone (MEDROL DOSEPACK) 4 mg tablet Take 4 mg by mouth once daily. use as directed       No current facility-administered medications for this visit.        PMFSHx:  Past Medical History:   Diagnosis Date    Anxiety     Arthritis     osteoarthritis    Chronic pain     Chronic pain     COPD (chronic obstructive pulmonary disease)     GERD (gastroesophageal reflux disease)     History of stomach ulcers     Hypertension     IBS (irritable bowel syndrome)     [facility] Diagn[facility] 2004    Palpitations        Past Surgical History:   Procedure Laterality Date    APPENDECTOMY       SECTION      COLONOSCOPY      ? results in [place]    COLONOSCOPY  2015    Dr. Farfan   diverticulosis    CYSTOSCOPY      HERNIA REPAIR      HYSTERECTOMY  9/3/2014    with bso    TOTAL ABDOMINAL HYSTERECTOMY W/ BILATERAL SALPINGOOPHORECTOMY Bilateral        Family History   Problem Relation Age of Onset    Hypertension Mother     Diabetes Sister     Colon cancer Maternal Grandmother     Breast cancer Other        Social History     Tobacco Use    Smoking status: Current Every Day Smoker     Packs/day: 0.50     Types:  Cigarettes    Smokeless tobacco: Never Used   Substance Use Topics    Alcohol use: No    Drug use: No       Review of Systems   Constitutional: Negative for appetite change, chills, fatigue, fever and unexpected weight change.   HENT: Negative for congestion, dental problem, ear pain, mouth sores, postnasal drip, rhinorrhea, sore throat, tinnitus, trouble swallowing and voice change.    Eyes: Negative for photophobia, pain, discharge and visual disturbance.   Respiratory: Negative for cough, chest tightness, shortness of breath and wheezing.    Cardiovascular: Negative for chest pain, palpitations and leg swelling.   Gastrointestinal: Negative for abdominal pain, blood in stool, constipation, diarrhea, nausea and vomiting.   Endocrine: Negative for cold intolerance, heat intolerance, polydipsia, polyphagia and polyuria.   Genitourinary: Negative for difficulty urinating, dysuria, flank pain, frequency, hematuria and urgency.   Musculoskeletal: Negative for arthralgias, joint swelling and myalgias.   Skin: Negative for color change and rash.   Allergic/Immunologic: Negative for immunocompromised state.   Neurological: Negative for dizziness, tremors, seizures, syncope, speech difficulty, weakness, numbness and headaches.   Hematological: Negative for adenopathy. Does not bruise/bleed easily.   Psychiatric/Behavioral: Negative for agitation, confusion, hallucinations, self-injury and suicidal ideas. The patient is not nervous/anxious.             Physical Exam   Constitutional: She is oriented to person, place, and time. She appears well-developed and well-nourished. She is active.  Non-toxic appearance. No distress.   Body mass index is 25.13 kg/m².     HENT:   Head: Normocephalic and atraumatic.   Right Ear: Hearing and external ear normal. No drainage or tenderness.   Left Ear: Hearing and external ear normal. No drainage or tenderness.   Nose: Nose normal. No rhinorrhea. No epistaxis.   Mouth/Throat: Uvula is  midline, oropharynx is clear and moist and mucous membranes are normal. Mucous membranes are not pale, not dry and not cyanotic. No oropharyngeal exudate.   Eyes: Pupils are equal, round, and reactive to light. Conjunctivae and lids are normal. Right eye exhibits no discharge and no exudate. Left eye exhibits no discharge and no exudate. Right conjunctiva is not injected. Right eye exhibits no nystagmus. Left eye exhibits no nystagmus.   Neck: Trachea normal, full passive range of motion without pain and phonation normal. No JVD present. Carotid bruit is not present. No tracheal deviation present. No thyroid mass and no thyromegaly present.   Cardiovascular: Normal rate, regular rhythm, S1 normal, S2 normal, normal heart sounds and intact distal pulses. PMI is not displaced. Exam reveals no gallop and no friction rub.   No murmur heard.  Pulmonary/Chest: Effort normal and breath sounds normal. No accessory muscle usage. No respiratory distress. She exhibits no mass, no tenderness and no crepitus. Right breast exhibits no inverted nipple, no mass, no nipple discharge, no skin change and no tenderness. Left breast exhibits no inverted nipple, no mass, no nipple discharge, no skin change and no tenderness. Breasts are symmetrical.   Abdominal: Soft. Normal appearance and bowel sounds are normal. She exhibits no distension, no fluid wave, no abdominal bruit and no mass. There is no hepatosplenomegaly. There is no tenderness. There is no rebound, no guarding and negative Ch's sign. No hernia. Hernia confirmed negative in the right inguinal area and confirmed negative in the left inguinal area.   Genitourinary: Rectum normal and vagina normal. There is no rash or lesion on the right labia. There is no rash or lesion on the left labia. Right adnexum displays no mass. Left adnexum displays no mass. No vaginal discharge found.   Musculoskeletal:        Cervical back: Normal.        Thoracic back: Normal.        Lumbar  back: Normal.        Right upper arm: Normal.        Left upper arm: Normal.        Right forearm: Normal.        Left forearm: Normal.        Right hand: Normal.        Left hand: Normal.        Right upper leg: Normal.        Left upper leg: Normal.        Right lower leg: Normal.        Left lower leg: Normal.        Right foot: Normal.        Left foot: Normal.   Lymphadenopathy:        Head (right side): No submental, no submandibular and no posterior auricular adenopathy present.        Head (left side): No submental, no submandibular and no posterior auricular adenopathy present.     She has no cervical adenopathy.     She has no axillary adenopathy.        Right: No inguinal and no supraclavicular adenopathy present.        Left: No inguinal and no supraclavicular adenopathy present.   Neurological: She is alert and oriented to person, place, and time. She has normal strength. No cranial nerve deficit or sensory deficit. Coordination and gait normal. GCS eye subscore is 4. GCS verbal subscore is 5. GCS motor subscore is 6.   Skin: Skin is warm, dry and intact. No rash noted. No cyanosis. Nails show no clubbing.   Psychiatric: She has a normal mood and affect. Her speech is normal. Judgment and thought content normal. Her mood appears not anxious. Her affect is not inappropriate. She is not actively hallucinating. She does not exhibit a depressed mood.           Medical Records Review:   EGD 6/16/15 confirmed a healing gastric ulcer EGD 4/17/15 revealed two deep cratered pyloric channel ulcers.  Biopsies confirmed Yousif's esophagus with no dysplasia and healing gastric ulcers with no evidence of Helicobacter.  Colonoscopy 4/17/15 revealed mild diverticulosis of the sigmoid colon and descending colon.     Gallbladder ultrasound 3/26/15 did not reveal any evidence of cholelithiasis, cholecystitis, dilated bile ducts, etc.  HIDA scan with CCK 4/1/15 documented an ejection fraction of 92%.    Lab results were  reviewed from 10/25/2019.  CBC showed no evidence of leukocytosis, anemia, or thrombocytopenia.  Electrolytes were all in the range of normal.  BUN and creatinine showed no evidence of renal dysfunction.  Glucose showed no suspicion diabetes.  Liver profile showed no evidence of hepatocellular disease or biliary obstruction.  Hemoglobin A1c showed no evidence of diabetes.  C-peptide level was in the range of normal.  TFTs indicates she is euthyroid.  Lipid profile showed a mild hypertriglyceridemia but no evidence of hypercholesterolemia.    EKG reviewed from 10/31/2019.  Twelve lead tracing showed a normal sinus rhythm with no evidence of any ischemic changes.    Assessment:       Dysphagia.  Unexplained weight loss.  Right upper quadrant abdominal pain.  Known Yousif's.  Differential diagnosis includes but is not limited to esophageal neoplasm, esophagitis, esophageal ulcer, gastroesophageal reflux disease, gastritis, gastric ulcer, gastric cancer, duodenitis, duodenal ulcer, inflammatory bowel disease, diverticulosis, hemorrhoids, gastrointestinal angiodysplasias, benign gastrointestinal neoplasm, colon cancer, cholelithiasis, chronic cholecystitis, gallbladder dyskinesia, ampullary dysfunction, etc.  Options at this time include but are not limited to endoscopy, second opinion, capsule endoscopy, radiologic studies, observation, etc.  Multiple comorbid issues ( HTN, COPD, GERD ) increase the complexity of required perioperative evaluation & management, risks of complications, and likely will increase the difficulty and complexity of postoperative care, etc.  These issues must be factored in to preoperative evaluation and perioperative management.    1. Right upper quadrant abdominal pain    2. Dysphagia, unspecified type    3. Weight loss    4. Chronic obstructive pulmonary disease, unspecified COPD type    5. Yousif's esophagus without dysplasia    6. Essential hypertension    7. Gastroesophageal reflux  disease, esophagitis presence not specified    8. History of stomach ulcers          Plan:   Right upper quadrant abdominal pain  -     Case Request Operating Room: COLONOSCOPY, ESOPHAGOGASTRODUODENOSCOPY (EGD)    Dysphagia, unspecified type    Weight loss    Chronic obstructive pulmonary disease, unspecified COPD type    Yousif's esophagus without dysplasia    Essential hypertension    Gastroesophageal reflux disease, esophagitis presence not specified    History of stomach ulcers    Other orders  -     HYDROcodone-acetaminophen (NORCO)  mg per tablet; Take 1 tablet by mouth every 8 (eight) hours as needed for Pain.  Dispense: 30 tablet; Refill: 0     Medical records from recent emergency room visit at [facility] will be obtained and reviewed.  Anticipate laparoscopic cholecystectomy shortly after the endoscopy or further evaluation of the gallbladder if a diagnosis of gallbladder disease has not been established.    Follow up in about 8 weeks (around 2/10/2020).    Counseling/Medical Decision Making:  Janina Klein was counseled regarding the results of the evaluation thus far and the differential diagnosis.  Diagnostic and therapeutic options were discussed in detail along with the risks, benefits, possible complications, details of, and indications for each option.  Diagnoses discussed included but were not limited to: GB disease, GERD, hiatal hernia, PUD, gastritis, duodenitis, Sphincter of Oddi dysfunction, hemorrhoids, diverticulosis, cancer, IBD, IBS, benign tumors, angiodysplasias, ischemic disease.  Options discussed included but were not limited to: EGD, colonoscopy, proctoscopy, anoscopy, sigmoidoscopy, radiologic studies, PillCam, empiric therapy, second opinion, etc. Possible complications of endoscopy discussed included but were not limited to: bleeding, infections, endocarditis, injury to internal organs, incomplete exam, failure to diagnose cancer or other serious condition, need  for emergency surgery, need for a temporary colostomy, need for additional operations or procedures, etc.  Entire conversation was held in layman's terms.  All unfamiliar terms were defined.  Questions solicited and answered.  I read the consent form to her verbatim.  Jt booklets were provided on EGD, GERD, GB disease / surgery, colonoscopy, polyps, diverticulosis, hemorrhoids, cancer, etc.  A copy of the consent form was provided for her review outside the office / hospital prior to the procedures.  At the conclusion of the conversation she voiced complete understanding of all we discussed and satisfaction that all of her questions had been answered to her full understanding.  She stated that she felt fully informed and completely capable of making an informed decision.  She requests and desires to proceed with EGD and colonoscopy.     Total face-to-face encounter time was 60 minutes, 40 minutes spent counseling as outlined/summarized above.

## 2019-12-19 NOTE — H&P
[facility] - General Surgery  General Surgery  H&P      Patient Name: Janina Klein  MRN: [id number]       Chief Complaint:  I am getting scoped again    HPI:  Ms. Klein presents today as a consult from Lisa Cooksey NP-C.  She was seen in clinic here about 20 months ago.  At that time she was experiencing postprandial nausea and diarrhea.  She was scheduled for an EGD and colonoscopy.  Procedures were not completed due to circumstances beyond her control. She returns today with postprandial nausea and occasional vomiting, unexplained weight loss, anorexia, fatigue, dysphagia, right upper quadrant abdominal pain, and right shoulder pain. Pain is described as severe, sharp, and stabbing.  Pain originates in the right upper quadrant but radiates to the inferior tip of the right scapula.  She was recently seen in the emergency room at [facility] and told that she had an inflamed gallbladder.  Evaluation included an ultrasound and laboratory studies.  These records are not available currently.  She was discharged with a prescription for Cipro.       Allergies & Meds:     Allergen Reactions    Lisinopril        Current Outpatient Medications   Medication Sig Dispense Refill    albuterol-ipratropium 2.5mg-0.5mg/3mL (DUO-NEB) 0.5 mg-3 mg(2.5 mg base)/3 mL nebulizer solution USE 1 VIAL VIA NEBULIZER FOUR TIMES DAILY 360 mL 9    amLODIPine (NORVASC) 5 MG tablet Take 1 tablet (5 mg total) by mouth once daily. 90 tablet 3    atorvastatin (LIPITOR) 10 MG tablet Take 1 tablet (10 mg total) by mouth once daily. 90 tablet 0    fluticasone (FLONASE) 50 mcg/actuation nasal spray 2 sprays (100 mcg total) by Each Nare route once daily. 3 Bottle 3    gabapentin (NEURONTIN) 800 MG tablet TAKE 1 TABLET THREE TIMES DAILY 270 tablet 1    metoprolol tartrate (LOPRESSOR) 50 MG tablet Take 1 tablet (50 mg total) by mouth 2 (two) times daily. 180 tablet 3    montelukast (SINGULAIR) 10 mg tablet TAKE  1 TABLET BY MOUTH EVERY DAY 90 tablet 1    pantoprazole (PROTONIX) 40 MG tablet TAKE 1 TABLET EVERY DAY 90 tablet 1    promethazine (PHENERGAN) 25 MG tablet Take 1 tablet (25 mg total) by mouth every 6 (six) hours as needed for Nausea. 30 tablet 0    umeclidinium-vilanterol (ANORO ELLIPTA) 62.5-25 mcg/actuation DsDv Inhale 1 puff into the lungs Daily. 60 each 5    albuterol 90 mcg/actuation inhaler Inhale 2 puffs into the lungs every 6 (six) hours as needed for Wheezing. 18 g 11    methylPREDNISolone (MEDROL DOSEPACK) 4 mg tablet Take 4 mg by mouth once daily. use as directed           PMFSHx:  Past Medical History:   Diagnosis Date    Anxiety     Arthritis     osteoarthritis    Chronic pain     Chronic pain     COPD (chronic obstructive pulmonary disease)     GERD (gastroesophageal reflux disease)     History of stomach ulcers     Hypertension     IBS (irritable bowel syndrome)     [facility] Diagnosed 2004    Palpitations        Past Surgical History:   Procedure Laterality Date    APPENDECTOMY       SECTION      COLONOSCOPY      ? results in [place]    COLONOSCOPY  2015    Dr. Farfan   diverticulosis    CYSTOSCOPY      HERNIA REPAIR      HYSTERECTOMY  9/3/2014    with bso    TOTAL ABDOMINAL HYSTERECTOMY W/ BILATERAL SALPINGOOPHORECTOMY Bilateral        Family History   Problem Relation Age of Onset    Hypertension Mother     Diabetes Sister     Colon cancer Maternal Grandmother     Breast cancer Other        Social History     Tobacco Use    Smoking status: Current Every Day Smoker     Packs/day: 0.50     Types: Cigarettes    Smokeless tobacco: Never Used   Substance Use Topics    Alcohol use: No    Drug use: No       Review of Systems   Constitutional: Negative for appetite change, chills, fatigue, fever and unexpected weight change.   HENT: Negative for congestion, dental problem, ear pain, mouth sores, postnasal drip, rhinorrhea, sore throat, tinnitus,  trouble swallowing and voice change.    Eyes: Negative for photophobia, pain, discharge and visual disturbance.   Respiratory: Negative for cough, chest tightness, shortness of breath and wheezing.    Cardiovascular: Negative for chest pain, palpitations and leg swelling.   Gastrointestinal: Negative for abdominal pain, blood in stool, constipation, diarrhea, nausea and vomiting.   Endocrine: Negative for cold intolerance, heat intolerance, polydipsia, polyphagia and polyuria.   Genitourinary: Negative for difficulty urinating, dysuria, flank pain, frequency, hematuria and urgency.   Musculoskeletal: Negative for arthralgias, joint swelling and myalgias.   Skin: Negative for color change and rash.   Allergic/Immunologic: Negative for immunocompromised state.   Neurological: Negative for dizziness, tremors, seizures, syncope, speech difficulty, weakness, numbness and headaches.   Hematological: Negative for adenopathy. Does not bruise/bleed easily.   Psychiatric/Behavioral: Negative for agitation, confusion, hallucinations, self-injury and suicidal ideas. The patient is not nervous/anxious.             Physical Exam   Constitutional: She is oriented to person, place, and time. She appears well-developed and well-nourished. She is activ.  Non-toxic appearance. No distress. Body mass index is 25.13 kg/m².   HENT: Head: Normocephalic and atraumatic.   Right Ear: Hearing and external ear normal. No drainage or tenderness.   Left Ear: Hearing and external ear normal. No drainage or tenderness.   Nose: Nose normal. No rhinorrhea. No epistaxis.   Mouth/Throat: Uvula is midline, oropharynx is clear and moist and mucous membranes are normal. Mucous membranes are not pale, not dry and not cyanotic. No oropharyngeal exudate.   Eyes: Pupils are equal, round, and reactive to light. Conjunctivae and lids are normal. Right eye exhibits no discharge and no exudate. Left eye exhibits no discharge and no exudate. Right conjunctiva is  not injected. Right eye exhibits no nystagmus. Left eye exhibits no nystagmus.   Neck: Trachea normal, full passive range of motion without pain and phonation normal. No JVD present. Carotid bruit is not present. No tracheal deviation present. No thyroid mass and no thyromegaly present.   Cardiovascular: Normal rate, regular rhythm, S1 normal, S2 normal, normal heart sounds and intact distal pulses. PMI is not displaced. Exam reveals no gallop and no friction rub.   No murmur heard.  Pulmonary/Chest: Effort normal and breath sounds normal. No accessory muscle usage. No respiratory distress. She exhibits no mass, no tenderness and no crepitus. Right breast exhibits no inverted nipple, no mass, no nipple discharge, no skin change and no tenderness. Left breast exhibits no inverted nipple, no mass, no nipple discharge, no skin change and no tenderness. Breasts are symmetrical.   Abdominal: Soft. Normal appearance and bowel sounds are normal. She exhibits no distension, no fluid wave, no abdominal bruit and no mass. There is no hepatosplenomegaly. There is no tenderness. There is no rebound, no guarding and negative Ch's sign. No hernia. Hernia confirmed negative in the right inguinal area and confirmed negative in the left inguinal area.   Genitourinary: Rectum normal and vagina normal. There is no rash or lesion on the right labia. There is no rash or lesion on the left labia. Right adnexum displays no mass. Left adnexum displays no mass. No vaginal discharge found.   Musculoskeletal:        Cervical back: Normal.        Thoracic back: Normal.        Lumbar back: Normal.        Right upper arm: Normal.        Left upper arm: Normal.        Right forearm: Normal.        Left forearm: Normal.        Right hand: Normal.        Left hand: Normal.        Right upper leg: Normal.        Left upper leg: Normal.        Right lower leg: Normal.        Left lower leg: Normal.        Right foot: Normal.        Left foot:  Normal.   Lymphadenopathy:        Head (right side): No submental, no submandibular and no posterior auricular adenopathy present.        Head (left side): No submental, no submandibular and no posterior auricular adenopathy present.     She has no cervical adenopathy.     She has no axillary adenopathy.        Right: No inguinal and no supraclavicular adenopathy present.        Left: No inguinal and no supraclavicular adenopathy present.   Neurological: She is alert and oriented to person, place, and time. She has normal strength. No cranial nerve deficit or sensory deficit. Coordination and gait normal. GCS eye subscore is 4. GCS verbal subscore is 5. GCS motor subscore is 6.   Skin: Skin is warm, dry and intact. No rash noted. No cyanosis. Nails show no clubbing.   Psychiatric: She has a normal mood and affect. Her speech is normal. Judgment and thought content normal. Her mood appears not anxious. Her affect is not inappropriate. She is not actively hallucinating. She does not exhibit a depressed mood.           Medical Records Review:   EGD 6/16/15 confirmed a healing gastric ulcer EGD 4/17/15 revealed two deep cratered pyloric channel ulcers.  Biopsies confirmed Yousif's esophagus with no dysplasia and healing gastric ulcers with no evidence of Helicobacter.  Colonoscopy 4/17/15 revealed mild diverticulosis of the sigmoid colon and descending colon.     Gallbladder ultrasound 3/26/15 did not reveal any evidence of cholelithiasis, cholecystitis, dilated bile ducts, etc.  HIDA scan with CCK 4/1/15 documented an ejection fraction of 92%.    Lab results were reviewed from 10/25/2019.  CBC showed no evidence of leukocytosis, anemia, or thrombocytopenia.  Electrolytes were all in the range of normal.  BUN and creatinine showed no evidence of renal dysfunction.  Glucose showed no suspicion diabetes.  Liver profile showed no evidence of hepatocellular disease or biliary obstruction.  Hemoglobin A1c showed no evidence  of diabetes.  C-peptide level was in the range of normal.  TFTs indicates she is euthyroid.  Lipid profile showed a mild hypertriglyceridemia but no evidence of hypercholesterolemia.    EKG reviewed from 10/31/2019.  Twelve lead tracing showed a normal sinus rhythm with no evidence of any ischemic changes.    Assessment:       Dysphagia.  Unexplained weight loss.  Right upper quadrant abdominal pain.  Differential diagnosis includes but is not limited to esophageal neoplasm, esophagitis, esophageal ulcer, gastroesophageal reflux disease, gastritis, gastric ulcer, gastric cancer, duodenitis, duodenal ulcer, inflammatory bowel disease, diverticulosis, hemorrhoids, gastrointestinal angiodysplasias, benign gastrointestinal neoplasm, colon cancer, cholelithiasis, chronic cholecystitis, gallbladder dyskinesia, ampullary dysfunction, etc.  Options at this time include but are not limited to endoscopy, second opinion, capsule endoscopy, radiologic studies, observation, etc.      1. Right upper quadrant abdominal pain    2. Dysphagia, unspecified type    3. Weight loss          Plan:   Right upper quadrant abdominal pain  -     Case Request Operating Room: COLONOSCOPY, ESOPHAGOGASTRODUODENOSCOPY (EGD)    Dysphagia, unspecified type    Weight loss     Medical records from recent emergency room visit at [facility] will be obtained and reviewed.  Anticipate laparoscopic cholecystectomy shortly after the endoscopy.    She still has the bowel prep prescribed when last seen.  She will use this bowel prep prior to the  colonoscopy scheduled above.    Follow up around 2/10/2020 for routine post endoscopy visit    Counseling/Medical Decision Making:  Janina Klein was counseled regarding the results of the evaluation thus far and the differential diagnosis.  Diagnostic and therapeutic options were discussed in detail along with the risks, benefits, possible complications, details of, and indications for each option.   Diagnoses discussed included but were not limited to: GB disease, GERD, hiatal hernia, PUD, gastritis, duodenitis, Sphincter of Oddi dysfunction, hemorrhoids, diverticulosis, cancer, IBD, IBS, benign tumors, angiodysplasias, ischemic disease.  Options discussed included but were not limited to: EGD, colonoscopy, proctoscopy, anoscopy, sigmoidoscopy, radiologic studies, PillCam, empiric therapy, second opinion, etc. Possible complications of endoscopy discussed included but were not limited to: bleeding, infections, endocarditis, injury to internal organs, incomplete exam, failure to diagnose cancer or other serious condition, need for emergency surgery, need for a temporary colostomy, need for additional operations or procedures, etc.  Entire conversation was held in layman's terms.  All unfamiliar terms were defined.  Questions solicited and answered.  I read the consent form to her verbatim.  Krames booklets were provided on EGD, GERD, GB disease / surgery, colonoscopy, polyps, diverticulosis, hemorrhoids, cancer, etc.  A copy of the consent form was provided for her review outside the office / hospital prior to the procedures.  At the conclusion of the conversation she voiced complete understanding of all we discussed and satisfaction that all of her questions had been answered to her full understanding.  She stated that she felt fully informed and completely capable of making an informed decision.  She requests and desires to proceed with EGD and colonoscopy.

## 2020-01-23 ENCOUNTER — TELEPHONE (OUTPATIENT)
Dept: SURGERY | Facility: CLINIC | Age: 60
End: 2020-01-23

## 2020-01-23 NOTE — TELEPHONE ENCOUNTER
----- Message from Stacie Callaway sent at 1/23/2020  3:29 PM CST -----  Type: Needs Medical Advice    Who Called:  Patient  Best Call Back Number: [phone] (home)     Additional Information: Drinking her prep for the colonoscopy in the morning but she threw up the first bottle. She wants to know what she should do. Please call to advise.

## 2020-01-23 NOTE — TELEPHONE ENCOUNTER
Writer spoke to patient and instructed her to buy 2 bottles of Magnesium citrate OTC and drink 1 bottl now and the 2nd bottle at 7:00 pm. Pt expressed verbal understanding.

## 2020-01-24 ENCOUNTER — ANESTHESIA (OUTPATIENT)
Dept: SURGERY | Facility: HOSPITAL | Age: 60
End: 2020-01-24
Payer: MEDICARE

## 2020-01-24 ENCOUNTER — ANESTHESIA EVENT (OUTPATIENT)
Dept: SURGERY | Facility: HOSPITAL | Age: 60
End: 2020-01-24
Payer: MEDICARE

## 2020-01-24 ENCOUNTER — HOSPITAL ENCOUNTER (OUTPATIENT)
Facility: HOSPITAL | Age: 60
Discharge: HOME | End: 2020-01-24
Attending: SURGERY | Admitting: SURGERY
Payer: MEDICARE

## 2020-01-24 VITALS
BODY MASS INDEX: 24.92 KG/M2 | RESPIRATION RATE: 16 BRPM | DIASTOLIC BLOOD PRESSURE: 90 MMHG | OXYGEN SATURATION: 95 % | SYSTOLIC BLOOD PRESSURE: 112 MMHG | TEMPERATURE: 97 F | HEART RATE: 69 BPM | HEIGHT: 64 IN | WEIGHT: 146 LBS

## 2020-01-24 DIAGNOSIS — R10.11 RIGHT UPPER QUADRANT ABDOMINAL PAIN: ICD-10-CM

## 2020-01-24 PROBLEM — K29.80 DUODENITIS: Status: ACTIVE | Noted: 2020-01-24

## 2020-01-24 PROBLEM — K57.30 DIVERTICULOSIS OF COLON: Status: ACTIVE | Noted: 2020-01-24

## 2020-01-24 PROBLEM — K20.90 ESOPHAGITIS: Status: ACTIVE | Noted: 2020-01-24

## 2020-01-24 PROBLEM — K44.9 HIATAL HERNIA: Status: ACTIVE | Noted: 2020-01-24

## 2020-01-24 PROBLEM — K29.70 GASTRITIS: Status: ACTIVE | Noted: 2020-01-24

## 2020-01-24 PROCEDURE — 25000003 PHARM REV CODE 250: Performed by: NURSE ANESTHETIST, CERTIFIED REGISTERED

## 2020-01-24 PROCEDURE — 63600175 PHARM REV CODE 636 W HCPCS: Performed by: SURGERY

## 2020-01-24 PROCEDURE — 37000008 HC ANESTHESIA 1ST 15 MINUTES: Performed by: SURGERY

## 2020-01-24 PROCEDURE — 43239 EGD BIOPSY SINGLE/MULTIPLE: CPT | Mod: 51,,, | Performed by: SURGERY

## 2020-01-24 PROCEDURE — 27201012 HC FORCEPS, HOT/COLD, DISP: Performed by: SURGERY

## 2020-01-24 PROCEDURE — 88305 TISSUE EXAM BY PATHOLOGIST: CPT | Mod: 26,,, | Performed by: PATHOLOGY

## 2020-01-24 PROCEDURE — 00813 ANES UPR LWR GI NDSC PX: CPT | Performed by: SURGERY

## 2020-01-24 PROCEDURE — 88305 TISSUE EXAM BY PATHOLOGIST: CPT | Performed by: PATHOLOGY

## 2020-01-24 PROCEDURE — 45378 PR COLONOSCOPY,DIAGNOSTIC: ICD-10-PCS | Mod: ,,, | Performed by: SURGERY

## 2020-01-24 PROCEDURE — D9220A PRA ANESTHESIA: Mod: ANES,,, | Performed by: ANESTHESIOLOGY

## 2020-01-24 PROCEDURE — 45378 DIAGNOSTIC COLONOSCOPY: CPT | Performed by: SURGERY

## 2020-01-24 PROCEDURE — 88305 TISSUE EXAM BY PATHOLOGIST: ICD-10-PCS | Mod: 26,,, | Performed by: PATHOLOGY

## 2020-01-24 PROCEDURE — D9220A PRA ANESTHESIA: ICD-10-PCS | Mod: CRNA,,, | Performed by: NURSE ANESTHETIST, CERTIFIED REGISTERED

## 2020-01-24 PROCEDURE — 43239 EGD BIOPSY SINGLE/MULTIPLE: CPT | Performed by: SURGERY

## 2020-01-24 PROCEDURE — 43239 PR EGD, FLEX, W/BIOPSY, SGL/MULTI: ICD-10-PCS | Mod: 51,,, | Performed by: SURGERY

## 2020-01-24 PROCEDURE — 45378 DIAGNOSTIC COLONOSCOPY: CPT | Mod: ,,, | Performed by: SURGERY

## 2020-01-24 PROCEDURE — 37000009 HC ANESTHESIA EA ADD 15 MINS: Performed by: SURGERY

## 2020-01-24 PROCEDURE — D9220A PRA ANESTHESIA: ICD-10-PCS | Mod: ANES,,, | Performed by: ANESTHESIOLOGY

## 2020-01-24 PROCEDURE — D9220A PRA ANESTHESIA: Mod: CRNA,,, | Performed by: NURSE ANESTHETIST, CERTIFIED REGISTERED

## 2020-01-24 PROCEDURE — 63600175 PHARM REV CODE 636 W HCPCS: Performed by: NURSE ANESTHETIST, CERTIFIED REGISTERED

## 2020-01-24 RX ORDER — SODIUM CHLORIDE, SODIUM LACTATE, POTASSIUM CHLORIDE, CALCIUM CHLORIDE 600; 310; 30; 20 MG/100ML; MG/100ML; MG/100ML; MG/100ML
INJECTION, SOLUTION INTRAVENOUS CONTINUOUS
Status: DISCONTINUED | OUTPATIENT
Start: 2020-01-24 | End: 2020-01-24 | Stop reason: HOSPADM

## 2020-01-24 RX ORDER — LIDOCAINE HYDROCHLORIDE 10 MG/ML
1 INJECTION, SOLUTION EPIDURAL; INFILTRATION; INTRACAUDAL; PERINEURAL ONCE
Status: DISCONTINUED | OUTPATIENT
Start: 2020-01-24 | End: 2020-01-24 | Stop reason: HOSPADM

## 2020-01-24 RX ORDER — MORPHINE SULFATE 4 MG/ML
2 INJECTION, SOLUTION INTRAMUSCULAR; INTRAVENOUS EVERY 5 MIN PRN
Status: DISCONTINUED | OUTPATIENT
Start: 2020-01-24 | End: 2020-01-24 | Stop reason: HOSPADM

## 2020-01-24 RX ORDER — LIDOCAINE HYDROCHLORIDE 20 MG/ML
INJECTION, SOLUTION EPIDURAL; INFILTRATION; INTRACAUDAL; PERINEURAL
Status: DISCONTINUED | OUTPATIENT
Start: 2020-01-24 | End: 2020-01-24

## 2020-01-24 RX ORDER — EPHEDRINE SULFATE 50 MG/ML
INJECTION, SOLUTION INTRAVENOUS
Status: DISCONTINUED | OUTPATIENT
Start: 2020-01-24 | End: 2020-01-24

## 2020-01-24 RX ORDER — KETOROLAC TROMETHAMINE 30 MG/ML
15 INJECTION, SOLUTION INTRAMUSCULAR; INTRAVENOUS ONCE
Status: DISCONTINUED | OUTPATIENT
Start: 2020-01-24 | End: 2020-01-24 | Stop reason: HOSPADM

## 2020-01-24 RX ORDER — PROPOFOL 10 MG/ML
VIAL (ML) INTRAVENOUS
Status: DISCONTINUED | OUTPATIENT
Start: 2020-01-24 | End: 2020-01-24

## 2020-01-24 RX ORDER — SODIUM CHLORIDE, SODIUM LACTATE, POTASSIUM CHLORIDE, CALCIUM CHLORIDE 600; 310; 30; 20 MG/100ML; MG/100ML; MG/100ML; MG/100ML
125 INJECTION, SOLUTION INTRAVENOUS CONTINUOUS
Status: DISCONTINUED | OUTPATIENT
Start: 2020-01-24 | End: 2020-01-24 | Stop reason: HOSPADM

## 2020-01-24 RX ORDER — ONDANSETRON 2 MG/ML
4 INJECTION INTRAMUSCULAR; INTRAVENOUS DAILY PRN
Status: DISCONTINUED | OUTPATIENT
Start: 2020-01-24 | End: 2020-01-24 | Stop reason: HOSPADM

## 2020-01-24 RX ORDER — OXYCODONE AND ACETAMINOPHEN 5; 325 MG/1; MG/1
1 TABLET ORAL
Status: DISCONTINUED | OUTPATIENT
Start: 2020-01-24 | End: 2020-01-24 | Stop reason: HOSPADM

## 2020-01-24 RX ADMIN — PROPOFOL 100 MG: 10 INJECTION, EMULSION INTRAVENOUS at 07:01

## 2020-01-24 RX ADMIN — SODIUM CHLORIDE, POTASSIUM CHLORIDE, SODIUM LACTATE AND CALCIUM CHLORIDE: 600; 310; 30; 20 INJECTION, SOLUTION INTRAVENOUS at 07:01

## 2020-01-24 RX ADMIN — EPHEDRINE SULFATE 10 MG: 50 INJECTION INTRAMUSCULAR; INTRAVENOUS; SUBCUTANEOUS at 07:01

## 2020-01-24 RX ADMIN — LIDOCAINE HYDROCHLORIDE 50 MG: 20 INJECTION, SOLUTION EPIDURAL; INFILTRATION; INTRACAUDAL; PERINEURAL at 07:01

## 2020-01-24 RX ADMIN — EPHEDRINE SULFATE 25 MG: 50 INJECTION INTRAMUSCULAR; INTRAVENOUS; SUBCUTANEOUS at 07:01

## 2020-01-24 RX ADMIN — EPHEDRINE SULFATE 15 MG: 50 INJECTION INTRAMUSCULAR; INTRAVENOUS; SUBCUTANEOUS at 07:01

## 2020-01-24 NOTE — DISCHARGE SUMMARY
[facility]  Discharge Note  Short Stay    Procedure(s) (LRB):  COLONOSCOPY (N/A)  ESOPHAGOGASTRODUODENOSCOPY (EGD) (N/A)    OUTCOME: Patient tolerated treatment/procedure well without complication and is now ready for discharge.    DISPOSITION: Home or Self Care    FINAL DIAGNOSIS:  Right upper quadrant abdominal pain.  Hiatal hernia.  Esophagitis.  Gastritis.  Duodenitis.  Diverticulosis.    FOLLOWUP: In clinic

## 2020-01-24 NOTE — H&P
[facility] - General Surgery  General Surgery  H&P  1/24/2020      Patient Name: Janina Klein  MRN: [id number]       Chief Complaint:  I am getting scoped again    HPI:  Ms. Klein presents today to proceed with an EGD and colonoscopy.  She was seen recently and surgery clinic and rescheduled for these procedures.  She was seen initially  as a consult from Lisa Cooksey NP-C.  She was seen in clinic here about 20 months ago.  At that time she was experiencing postprandial nausea and diarrhea.  She was scheduled for an EGD and colonoscopy.  Procedures were not completed due to circumstances beyond her control. She returns today with postprandial nausea and occasional vomiting, unexplained weight loss, anorexia, fatigue, dysphagia, right upper quadrant abdominal pain, and right shoulder pain. Pain is described as severe, sharp, and stabbing.  Pain originates in the right upper quadrant but radiates to the inferior tip of the right scapula.  She was recently seen in the emergency room at [facility] and told that she had an inflamed gallbladder.  Evaluation included an ultrasound and laboratory studies.  These records are still not available.  She was discharged with a prescription for Cipro.       Allergies & Meds:     Allergen Reactions    Lisinopril        Current Outpatient Medications   Medication Sig Dispense Refill    albuterol-ipratropium 2.5mg-0.5mg/3mL (DUO-NEB) 0.5 mg-3 mg(2.5 mg base)/3 mL nebulizer solution USE 1 VIAL VIA NEBULIZER FOUR TIMES DAILY 360 mL 9    amLODIPine (NORVASC) 5 MG tablet Take 1 tablet (5 mg total) by mouth once daily. 90 tablet 3    atorvastatin (LIPITOR) 10 MG tablet Take 1 tablet (10 mg total) by mouth once daily. 90 tablet 0    fluticasone (FLONASE) 50 mcg/actuation nasal spray 2 sprays (100 mcg total) by Each Nare route once daily. 3 Bottle 3    gabapentin (NEURONTIN) 800 MG tablet TAKE 1 TABLET THREE TIMES DAILY 270 tablet 1    metoprolol  tartrate (LOPRESSOR) 50 MG tablet Take 1 tablet (50 mg total) by mouth 2 (two) times daily. 180 tablet 3    montelukast (SINGULAIR) 10 mg tablet TAKE 1 TABLET BY MOUTH EVERY DAY 90 tablet 1    pantoprazole (PROTONIX) 40 MG tablet TAKE 1 TABLET EVERY DAY 90 tablet 1    promethazine (PHENERGAN) 25 MG tablet Take 1 tablet (25 mg total) by mouth every 6 (six) hours as needed for Nausea. 30 tablet 0    umeclidinium-vilanterol (ANORO ELLIPTA) 62.5-25 mcg/actuation DsDv Inhale 1 puff into the lungs Daily. 60 each 5    albuterol 90 mcg/actuation inhaler Inhale 2 puffs into the lungs every 6 (six) hours as needed for Wheezing. 18 g 11    methylPREDNISolone (MEDROL DOSEPACK) 4 mg tablet Take 4 mg by mouth once daily. use as directed           PMFSHx:  Past Medical History:   Diagnosis Date    Anxiety     Arthritis     osteoarthritis    Chronic pain     Chronic pain     COPD (chronic obstructive pulmonary disease)     GERD (gastroesophageal reflux disease)     History of stomach ulcers     Hypertension     IBS (irritable bowel syndrome)     [facility] Diagnosed 2004    Palpitations        Past Surgical History:   Procedure Laterality Date    APPENDECTOMY       SECTION      COLONOSCOPY      ? results in [place]    COLONOSCOPY  2015    Dr. Farfan   diverticulosis    CYSTOSCOPY      HERNIA REPAIR      HYSTERECTOMY  9/3/2014    with bso    TOTAL ABDOMINAL HYSTERECTOMY W/ BILATERAL SALPINGOOPHORECTOMY Bilateral        Family History   Problem Relation Age of Onset    Hypertension Mother     Diabetes Sister     Colon cancer Maternal Grandmother     Breast cancer Other        Social History     Tobacco Use    Smoking status: Current Every Day Smoker     Packs/day: 0.50     Types: Cigarettes    Smokeless tobacco: Never Used   Substance Use Topics    Alcohol use: No    Drug use: No       Review of Systems   Constitutional: Negative for appetite change, chills, fatigue, fever  and unexpected weight change.   HENT: Negative for congestion, dental problem, ear pain, mouth sores, postnasal drip, rhinorrhea, sore throat, tinnitus, trouble swallowing and voice change.    Eyes: Negative for photophobia, pain, discharge and visual disturbance.   Respiratory: Negative for cough, chest tightness, shortness of breath and wheezing.    Cardiovascular: Negative for chest pain, palpitations and leg swelling.   Gastrointestinal: Negative for abdominal pain, blood in stool, constipation, diarrhea, nausea and vomiting.   Endocrine: Negative for cold intolerance, heat intolerance, polydipsia, polyphagia and polyuria.   Genitourinary: Negative for difficulty urinating, dysuria, flank pain, frequency, hematuria and urgency.   Musculoskeletal: Negative for arthralgias, joint swelling and myalgias.   Skin: Negative for color change and rash.   Allergic/Immunologic: Negative for immunocompromised state.   Neurological: Negative for dizziness, tremors, seizures, syncope, speech difficulty, weakness, numbness and headaches.   Hematological: Negative for adenopathy. Does not bruise/bleed easily.   Psychiatric/Behavioral: Negative for agitation, confusion, hallucinations, self-injury and suicidal ideas. The patient is not nervous/anxious.             Physical Exam   Constitutional: She is oriented to person, place, and time. She appears well-developed and well-nourished. She is activ.  Non-toxic appearance. No distress. Body mass index is 25.13 kg/m².   HENT: Head: Normocephalic and atraumatic.   Right Ear: Hearing and external ear normal. No drainage or tenderness.   Left Ear: Hearing and external ear normal. No drainage or tenderness.   Nose: Nose normal. No rhinorrhea. No epistaxis.   Mouth/Throat: Uvula is midline, oropharynx is clear and moist and mucous membranes are normal. Mucous membranes are not pale, not dry and not cyanotic. No oropharyngeal exudate.   Eyes: Pupils are equal, round, and reactive to  light. Conjunctivae and lids are normal. Right eye exhibits no discharge and no exudate. Left eye exhibits no discharge and no exudate. Right conjunctiva is not injected. Right eye exhibits no nystagmus. Left eye exhibits no nystagmus.   Neck: Trachea normal, full passive range of motion without pain and phonation normal. No JVD present. Carotid bruit is not present. No tracheal deviation present. No thyroid mass and no thyromegaly present.   Cardiovascular: Normal rate, regular rhythm, S1 normal, S2 normal, normal heart sounds and intact distal pulses. PMI is not displaced. Exam reveals no gallop and no friction rub.   No murmur heard.  Pulmonary/Chest: Effort normal and breath sounds normal. No accessory muscle usage. No respiratory distress. She exhibits no mass, no tenderness and no crepitus. Right breast exhibits no inverted nipple, no mass, no nipple discharge, no skin change and no tenderness. Left breast exhibits no inverted nipple, no mass, no nipple discharge, no skin change and no tenderness. Breasts are symmetrical.   Abdominal: Soft. Normal appearance and bowel sounds are normal. She exhibits no distension, no fluid wave, no abdominal bruit and no mass. There is no hepatosplenomegaly. There is no tenderness. There is no rebound, no guarding and negative Ch's sign. No hernia. Hernia confirmed negative in the right inguinal area and confirmed negative in the left inguinal area.   Genitourinary: Rectum normal and vagina normal. There is no rash or lesion on the right labia. There is no rash or lesion on the left labia. Right adnexum displays no mass. Left adnexum displays no mass. No vaginal discharge found.   Musculoskeletal:        Cervical back: Normal.        Thoracic back: Normal.        Lumbar back: Normal.        Right upper arm: Normal.        Left upper arm: Normal.        Right forearm: Normal.        Left forearm: Normal.        Right hand: Normal.        Left hand: Normal.        Right  upper leg: Normal.        Left upper leg: Normal.        Right lower leg: Normal.        Left lower leg: Normal.        Right foot: Normal.        Left foot: Normal.   Lymphadenopathy:        Head (right side): No submental, no submandibular and no posterior auricular adenopathy present.        Head (left side): No submental, no submandibular and no posterior auricular adenopathy present.     She has no cervical adenopathy.     She has no axillary adenopathy.        Right: No inguinal and no supraclavicular adenopathy present.        Left: No inguinal and no supraclavicular adenopathy present.   Neurological: She is alert and oriented to person, place, and time. She has normal strength. No cranial nerve deficit or sensory deficit. Coordination and gait normal. GCS eye subscore is 4. GCS verbal subscore is 5. GCS motor subscore is 6.   Skin: Skin is warm, dry and intact. No rash noted. No cyanosis. Nails show no clubbing.   Psychiatric: She has a normal mood and affect. Her speech is normal. Judgment and thought content normal. Her mood appears not anxious. Her affect is not inappropriate. She is not actively hallucinating. She does not exhibit a depressed mood.           Medical Records Review:   EGD 6/16/15 confirmed a healing gastric ulcer EGD 4/17/15 revealed two deep cratered pyloric channel ulcers.  Biopsies confirmed Yousif's esophagus with no dysplasia and healing gastric ulcers with no evidence of Helicobacter.  Colonoscopy 4/17/15 revealed mild diverticulosis of the sigmoid colon and descending colon.     Gallbladder ultrasound 3/26/15 did not reveal any evidence of cholelithiasis, cholecystitis, dilated bile ducts, etc.  HIDA scan with CCK 4/1/15 documented an ejection fraction of 92%.    Lab results were reviewed from 10/25/2019.  CBC showed no evidence of leukocytosis, anemia, or thrombocytopenia.  Electrolytes were all in the range of normal.  BUN and creatinine showed no evidence of renal dysfunction.   Glucose showed no suspicion diabetes.  Liver profile showed no evidence of hepatocellular disease or biliary obstruction.  Hemoglobin A1c showed no evidence of diabetes.  C-peptide level was in the range of normal.  TFTs indicates she is euthyroid.  Lipid profile showed a mild hypertriglyceridemia but no evidence of hypercholesterolemia.    EKG reviewed from 10/31/2019.  Twelve lead tracing showed a normal sinus rhythm with no evidence of any ischemic changes.    Assessment:       Dysphagia.  Unexplained weight loss.  Right upper quadrant abdominal pain.  Differential diagnosis includes but is not limited to esophageal neoplasm, esophagitis, esophageal ulcer, gastroesophageal reflux disease, gastritis, gastric ulcer, gastric cancer, duodenitis, duodenal ulcer, inflammatory bowel disease, diverticulosis, hemorrhoids, gastrointestinal angiodysplasias, benign gastrointestinal neoplasm, colon cancer, cholelithiasis, chronic cholecystitis, gallbladder dyskinesia, ampullary dysfunction, etc.  Options at this time include but are not limited to endoscopy, second opinion, capsule endoscopy, radiologic studies, observation, etc.      1. Right upper quadrant abdominal pain    2. Dysphagia, unspecified type    3. Weight loss          Plan:   Right upper quadrant abdominal pain  -     Case Request Operating Room: COLONOSCOPY, ESOPHAGOGASTRODUODENOSCOPY (EGD)    Dysphagia, unspecified type    Weight loss     Medical records from recent emergency room visit at [facility] will be requested again.  She still has the bowel prep prescribed when last seen.  She will use this bowel prep prior to the colonoscopy scheduled above.    Follow up around 2/10/2020 for routine post endoscopy visit    Counseling/Medical Decision Making:  Janina Klein was counseled regarding the results of the evaluation thus far and the differential diagnosis.  Diagnostic and therapeutic options were discussed in detail along with the risks,  benefits, possible complications, details of, and indications for each option.  Diagnoses discussed included but were not limited to: GB disease, GERD, hiatal hernia, PUD, gastritis, duodenitis, Sphincter of Oddi dysfunction, hemorrhoids, diverticulosis, cancer, IBD, IBS, benign tumors, angiodysplasias, ischemic disease.  Options discussed included but were not limited to: EGD, colonoscopy, proctoscopy, anoscopy, sigmoidoscopy, radiologic studies, PillCam, empiric therapy, second opinion, etc. Possible complications of endoscopy discussed included but were not limited to: bleeding, infections, endocarditis, injury to internal organs, incomplete exam, failure to diagnose cancer or other serious condition, need for emergency surgery, need for a temporary colostomy, need for additional operations or procedures, etc.  Entire conversation was held in layman's terms.  All unfamiliar terms were defined.  Questions solicited and answered.  I read the consent form to her verbatim.  Krames booklets were provided on EGD, GERD, GB disease / surgery, colonoscopy, polyps, diverticulosis, hemorrhoids, cancer, etc.  A copy of the consent form was provided for her review outside the office / hospital prior to the procedures.  At the conclusion of the conversation she voiced complete understanding of all we discussed and satisfaction that all of her questions had been answered to her full understanding.  She stated that she felt fully informed and completely capable of making an informed decision.  She requests and desires to proceed with EGD and colonoscopy.     No evident contraindication to proceeding with planned endoscopy today.    Janina ROSE Ernie was interviewed and examined again today preoperatively, H&P updated, consent completed, and she is ready to roll into the operating/procedure room at 0715 on 1/24/2020.

## 2020-01-24 NOTE — PLAN OF CARE
patient arrived via stretcher from OR. O2 applied and pt placed in [place]  And fluids opened to increase BP. IV clean, dry and intact. Bed in lowest position, brakes locked and side rails up X2. Will continue to monitor.

## 2020-01-24 NOTE — [employer] PATIENT INSTRUCTIONS
Discharge Summary/Instructions after an Endoscopic Procedure  Patient Name: Janina Klein  Patient MRN: [id number]  Patient YOB: 1960 Friday, January 24, 2020  Casey Farfan MD  RESTRICTIONS:  During your procedure today, you received medications for sedation.  These   medications may affect your judgment, balance and coordination.  Therefore,   for 24 hours, you have the following restrictions:   - DO NOT drive a car, operate machinery, make legal/financial decisions,   sign important papers or drink alcohol.    ACTIVITY:  Today: no heavy lifting, straining or running due to procedural   sedation/anesthesia.  The following day: return to full activity including work.  DIET:  Eat and drink normally unless instructed otherwise.     TREATMENT FOR COMMON SIDE EFFECTS:  - Mild abdominal pain, nausea, belching, bloating or excessive gas:  rest,   eat lightly and use a heating pad.  - Sore Throat: treat with throat lozenges and/or gargle with warm salt   water.  - Because air was used during the procedure, expelling large amounts of air   from your rectum or belching is normal.  - If a bowel prep was taken, you may not have a bowel movement for 1-3 days.    This is normal.  SYMPTOMS TO WATCH FOR AND REPORT TO YOUR PHYSICIAN:  1. Abdominal pain or bloating, other than gas cramps.  2. Chest pain.  3. Back pain.  4. Signs of infection such as: chills or fever occurring within 24 hours   after the procedure.  5. Rectal bleeding, which would show as bright red, maroon, or black stools.   (A tablespoon of blood from the rectum is not serious, especially if   hemorrhoids are present.)  6. Vomiting.  7. Weakness or dizziness.  GO DIRECTLY TO THE NEAREST EMERGENCY ROOM IF YOU HAVE ANY OF THE FOLLOWING:      Difficulty breathing              Chills and/or fever over 101 F   Persistent vomiting and/or vomiting blood   Severe abdominal pain   Severe chest pain   Black, tarry stools   Bleeding- more than one  tablespoon   Any other symptom or condition that you feel may need urgent attention  Your doctor recommends these additional instructions:  If any biopsies were taken, your doctors clinic will contact you in 1 to 2   weeks with any results.  - Discharge patient to home.   - High fiber diet.   - Continue present medications.   - Return to my office in 2 weeks.   - Repeat colonoscopy in 10 years for screening purposes.   - See EGD Report  - Patient has a contact number available for emergencies.  The signs and   symptoms of potential delayed complications were discussed with the   patient.  Return to normal activities tomorrow.  Written discharge   instructions were provided to the patient.  For questions, problems or results please call your physician - Casey Farfan MD at Work:  [phone].  [facility] EMERGENCY ROOM PHONE NUMBER: [phone]  IF A COMPLICATION OR EMERGENCY SITUATION ARISES AND YOU ARE UNABLE TO REACH   YOUR PHYSICIAN - GO DIRECTLY TO THE EMERGENCY ROOM.  MD Casey Stephenson MD  1/24/2020 9:52:51 AM  This report has been verified and signed electronically.  [employer]

## 2020-01-24 NOTE — PROVATION PATIENT INSTRUCTIONS
Discharge Summary/Instructions after an Endoscopic Procedure  Patient Name: Janina Klein  Patient MRN: [id number]  Patient YOB: 1960 Friday, January 24, 2020  Casey Farfan MD  RESTRICTIONS:  During your procedure today, you received medications for sedation.  These   medications may affect your judgment, balance and coordination.  Therefore,   for 24 hours, you have the following restrictions:   - DO NOT drive a car, operate machinery, make legal/financial decisions,   sign important papers or drink alcohol.    ACTIVITY:  Today: no heavy lifting, straining or running due to procedural   sedation/anesthesia.  The following day: return to full activity including work.  DIET:  Eat and drink normally unless instructed otherwise.     TREATMENT FOR COMMON SIDE EFFECTS:  - Mild abdominal pain, nausea, belching, bloating or excessive gas:  rest,   eat lightly and use a heating pad.  - Sore Throat: treat with throat lozenges and/or gargle with warm salt   water.  - Because air was used during the procedure, expelling large amounts of air   from your rectum or belching is normal.  - If a bowel prep was taken, you may not have a bowel movement for 1-3 days.    This is normal.  SYMPTOMS TO WATCH FOR AND REPORT TO YOUR PHYSICIAN:  1. Abdominal pain or bloating, other than gas cramps.  2. Chest pain.  3. Back pain.  4. Signs of infection such as: chills or fever occurring within 24 hours   after the procedure.  5. Rectal bleeding, which would show as bright red, maroon, or black stools.   (A tablespoon of blood from the rectum is not serious, especially if   hemorrhoids are present.)  6. Vomiting.  7. Weakness or dizziness.  GO DIRECTLY TO THE NEAREST EMERGENCY ROOM IF YOU HAVE ANY OF THE FOLLOWING:      Difficulty breathing              Chills and/or fever over 101 F   Persistent vomiting and/or vomiting blood   Severe abdominal pain   Severe chest pain   Black, tarry stools   Bleeding- more than one  tablespoon   Any other symptom or condition that you feel may need urgent attention  Your doctor recommends these additional instructions:  If any biopsies were taken, your doctors clinic will contact you in 1 to 2   weeks with any results.  - Discharge patient to home.   - High fiber diet.   - Continue present medications.   - Continue Protonix  - Follow an antireflux regimen.   - Await pathology results.   - Treat Helicobacter if present  - Review Colonoscopy Report  - Return to my office in 2 weeks.   - Further recommendations will depend on clinical course  - Patient has a contact number available for emergencies.  The signs and   symptoms of potential delayed complications were discussed with the   patient.  Return to normal activities tomorrow.  Written discharge   instructions were provided to the patient.  For questions, problems or results please call your physician - Casey Farfan MD at Work:  [phone].  [facility] EMERGENCY ROOM PHONE NUMBER: [phone]  IF A COMPLICATION OR EMERGENCY SITUATION ARISES AND YOU ARE UNABLE TO REACH   YOUR PHYSICIAN - GO DIRECTLY TO THE EMERGENCY ROOM.  MD Casey Stephenson MD  1/24/2020 10:09:06 AM  This report has been verified and signed electronically.  [employer]

## 2020-01-24 NOTE — TRANSFER OF CARE
"Anesthesia Transfer of Care Note    Patient: Janina Kelin    Procedure(s) Performed: Procedure(s) (LRB):  COLONOSCOPY (N/A)  ESOPHAGOGASTRODUODENOSCOPY (EGD) (N/A)    Patient location: PACU    Anesthesia Type: general    Transport from OR: Transported from OR on room air with adequate spontaneous ventilation    Post pain: adequate analgesia    Post assessment: no apparent anesthetic complications and tolerated procedure well    Post vital signs: stable    Level of consciousness: sedated and responds to stimulation    Nausea/Vomiting: no nausea/vomiting    Complications: none    Transfer of care protocol was followed      Last vitals:   Visit Vitals  BP (!) 138/97 (BP Location: Left arm, Patient Position: Lying)   Pulse 67   Temp 36.4 °C (97.6 °F) (Oral)   Resp 13   Ht 5' 4" (1.626 m)   Wt 66.2 kg (146 lb)   Breastfeeding? No   BMI 25.06 kg/m²     "

## 2020-01-24 NOTE — DISCHARGE INSTRUCTIONS
Colonoscopy     A camera attached to a flexible tube with a viewing lens is used to take video pictures.     Colonoscopy is a test to view the inside of your lower digestive tract (colon and rectum). Sometimes it can show the last part of the small intestine (ileum). During the test, small pieces of tissue may be removed for testing. This is called a biopsy. Small growths, such as polyps, may also be removed.   Why is colonoscopy done?  The test is done to help look for colon cancer. And it can help find the source of abdominal pain, bleeding, and changes in bowel habits. It may be needed once a year, depending on factors such as your:  · Age  · Health history  · Family health history  · Symptoms  · Results from any prior colonoscopy  Risks and possible complications  These include:  · Bleeding               · A puncture or tear in the colon   · Risks of anesthesia  · A cancer lesion not being seen  Getting ready   To prepare for the test:  · Talk with your healthcare provider about the risks of the test (see below). Also ask your healthcare provider about alternatives to the test.  · Tell your healthcare provider about any medicines you take. Also tell him or her about any health conditions you may have.  · Make sure your rectum and colon are empty for the test. Follow the diet and bowel prep instructions exactly. If you dont, the test may need to be rescheduled.  · Plan for a friend or family member to drive you home after the test.     Colonoscopy provides an inside view of the entire colon.     You may discuss the results with your doctor right away or at a future visit.  During the test   The test is usually done in the hospital on an outpatient basis. This means you go home the same day. The procedure takes about 30 minutes. During that time:  · You are given relaxing (sedating) medicine through an IV line. You may be drowsy, or fully asleep.  · The healthcare provider will first give you a physical exam to  check for anal and rectal problems.  · Then the anus is lubricated and the scope inserted.  · If you are awake, you may have a feeling similar to needing to have a bowel movement. You may also feel pressure as air is pumped into the colon. Its OK to pass gas during the procedure.  · Biopsy, polyp removal, or other treatments may be done during the test.  After the test   You may have gas right after the test. It can help to try to pass it to help prevent later bloating. Your healthcare provider may discuss the results with you right away. Or you may need to schedule a follow-up visit to talk about the results. After the test, you can go back to your normal eating and other activities. You may be tired from the sedation and need to rest for a few hours.  Date Last Reviewed: 11/1/2016 © 2000-2017 [employer]. [address] [place] [address]. All rights reserved. This information is not intended as a substitute for professional medical care. Always follow your healthcare professional's instructions.        Anesthesia: Monitored Anesthesia Care (MAC)    Youre due to have surgery. During surgery, youll be given medicine called anesthesia. This will keep you comfortable and pain-free. Your surgeon will use monitored anesthesia care (MAC). This sheet tells you more about this type of anesthesia.  What is monitored anesthesia care?  MAC keeps you very drowsy during surgery. You may be awake, but you will likely not remember much. And you wont feel pain. With MAC, medicines are given through an IV line into a vein in your arm or hand. A local anesthetic will usually be injected into the skin and muscle around the surgical site to numb it. The anesthesia provider monitors you during the procedure. He or she checks your heart rate and rhythm, blood pressure, and blood oxygen level.  Anesthesia tools and medicines that may be near you during your procedure  You will likely have:  · A pulse oximeter  on the end of your finger. This measures your blood oxygen level.  · Electrocardiography leads (electrodes) on your chest. These record your heart rate and rhythm.  · Medicines given through an IV. These relax you and prevent pain. You may be awake or sleep lightly. If you have local anesthetic, it is injected directly into your skin.  · A facemask to give you oxygen, if needed.  Risks and possible complications  MAC has some risks. These include:  · Breathing problems  · Nausea and vomiting  · Allergic reaction to the anesthetic    Anesthesia safety  Tips for anesthesia safety include the following:   · Follow all instructions you are given for how long not to eat or drink before your procedure.  · Be sure your healthcare provider knows what medicines you take, especially any anti-inflammatory medicine or blood thinners. This includes aspirin and any other over-the-counter medicines, herbs, and supplements.  · Have an adult family member or friend drive you home after the procedure.  · For the first 24 hours after your surgery:  ¨ Do not drive or use heavy equipment.  ¨ Do not make important decisions or sign documents.  ¨ Avoid alcohol.  ¨ Have someone stay with you, if possible. They can watch for problems and help keep you safe.  Date Last Reviewed: 12/1/2016  © 0877-4623 [employer]. [address] [place] [address], [place] [address]. All rights reserved. This information is not intended as a substitute for professional medical care. Always follow your healthcare professional's instructions.

## 2020-01-24 NOTE — ANESTHESIA PREPROCEDURE EVALUATION
01/24/2020  Janina Klein is a 60 y.o., female.    Pre-op Assessment    I have reviewed the Patient Summary Reports.    I have reviewed the Nursing Notes.   I have reviewed the Medications.     Review of Systems  Anesthesia Hx:  No problems with previous Anesthesia  Neg history of prior surgery. Denies Family Hx of Anesthesia complications.   Denies Personal Hx of Anesthesia complications.   Social:  Smoker    Hematology/Oncology:  Hematology Normal   Oncology Normal     EENT/Dental:EENT/Dental Normal   Cardiovascular:   Hypertension, well controlled    Pulmonary:   COPD, mild    Renal/:  Renal/ Normal     Hepatic/GI:   GERD, poorly controlled    Musculoskeletal:   Arthritis     Neurological:   Neuromuscular Disease,    Endocrine:  Endocrine Normal    Dermatological:  Skin Normal    Psych:  Psychiatric Normal           Physical Exam  General:  Well nourished    Airway/Jaw/Neck:  Airway Findings: Mouth Opening: Normal Tongue: Normal  General Airway Assessment: Adult  Mallampati: II  TM Distance: 4 - 6 cm        Eyes/Ears/Nose:  EYES/EARS/NOSE FINDINGS: Normal   Dental:  DENTAL FINDINGS: Normal   Chest/Lungs:  Chest/Lungs Clear    Heart/Vascular:  Heart Findings: Normal Heart murmur: negative Vascular Findings: Normal    Abdomen:  Abdomen Findings: Normal    Musculoskeletal:  Musculoskeletal Findings: Normal   Skin:  Skin Findings: Normal    Mental Status:  Mental Status Findings: Normal        Anesthesia Plan  Type of Anesthesia, risks & benefits discussed:  Anesthesia Type:  general  Patient's Preference:   Intra-op Monitoring Plan: standard ASA monitors  Intra-op Monitoring Plan Comments:   Post Op Pain Control Plan:   Post Op Pain Control Plan Comments:   Induction:   IV  Beta Blocker:  Patient is not currently on a Beta-Blocker (No further documentation required).       Informed Consent: Patient  understands risks and agrees with Anesthesia plan.  Questions answered. Anesthesia consent signed with patient.  ASA Score: 2     Day of Surgery Review of History & Physical:    H&P update referred to the provider.

## 2020-01-29 LAB
FINAL PATHOLOGIC DIAGNOSIS: NORMAL
GROSS: NORMAL

## 2020-02-06 ENCOUNTER — OFFICE VISIT (OUTPATIENT)
Dept: SURGERY | Facility: CLINIC | Age: 60
End: 2020-02-06
Payer: MEDICARE

## 2020-02-06 VITALS
SYSTOLIC BLOOD PRESSURE: 146 MMHG | OXYGEN SATURATION: 96 % | BODY MASS INDEX: 24.41 KG/M2 | TEMPERATURE: 99 F | RESPIRATION RATE: 16 BRPM | HEART RATE: 79 BPM | WEIGHT: 143 LBS | DIASTOLIC BLOOD PRESSURE: 94 MMHG | HEIGHT: 64 IN

## 2020-02-06 DIAGNOSIS — K44.9 HIATAL HERNIA: ICD-10-CM

## 2020-02-06 DIAGNOSIS — K57.30 DIVERTICULOSIS OF COLON: ICD-10-CM

## 2020-02-06 DIAGNOSIS — K29.50 OTHER CHRONIC GASTRITIS WITHOUT HEMORRHAGE: ICD-10-CM

## 2020-02-06 DIAGNOSIS — K21.00 GASTROESOPHAGEAL REFLUX DISEASE WITH ESOPHAGITIS: ICD-10-CM

## 2020-02-06 DIAGNOSIS — R10.11 RUQ PAIN: Primary | ICD-10-CM

## 2020-02-06 DIAGNOSIS — K22.70 BARRETT'S ESOPHAGUS WITHOUT DYSPLASIA: ICD-10-CM

## 2020-02-06 PROCEDURE — 3077F SYST BP >= 140 MM HG: CPT | Mod: CPTII,S$GLB,, | Performed by: SURGERY

## 2020-02-06 PROCEDURE — 3008F PR BODY MASS INDEX (BMI) DOCUMENTED: ICD-10-PCS | Mod: CPTII,S$GLB,, | Performed by: SURGERY

## 2020-02-06 PROCEDURE — 3080F DIAST BP >= 90 MM HG: CPT | Mod: CPTII,S$GLB,, | Performed by: SURGERY

## 2020-02-06 PROCEDURE — 3008F BODY MASS INDEX DOCD: CPT | Mod: CPTII,S$GLB,, | Performed by: SURGERY

## 2020-02-06 PROCEDURE — 99214 OFFICE O/P EST MOD 30 MIN: CPT | Mod: S$GLB,,, | Performed by: SURGERY

## 2020-02-06 PROCEDURE — 3080F PR MOST RECENT DIASTOLIC BLOOD PRESSURE >= 90 MM HG: ICD-10-PCS | Mod: CPTII,S$GLB,, | Performed by: SURGERY

## 2020-02-06 PROCEDURE — 99214 PR OFFICE/OUTPT VISIT, EST, LEVL IV, 30-39 MIN: ICD-10-PCS | Mod: S$GLB,,, | Performed by: SURGERY

## 2020-02-06 PROCEDURE — 3077F PR MOST RECENT SYSTOLIC BLOOD PRESSURE >= 140 MM HG: ICD-10-PCS | Mod: CPTII,S$GLB,, | Performed by: SURGERY

## 2020-02-06 RX ORDER — SUCRALFATE 1 G/1
1 TABLET ORAL 4 TIMES DAILY
Qty: 120 TABLET | Refills: 11 | Status: SHIPPED | OUTPATIENT
Start: 2020-02-06 | End: 2021-02-05

## 2020-02-06 NOTE — PROGRESS NOTES
"Subjective:       Patient ID: Janina Klein is a 60 y.o. female.    Chief Complaint: Follow-up (EGD/Colonoscopy1/30/20)      HPI:  Ms. Klein presents today following a recent outpatient EGD and colonoscopy  She presents today with no complaints.  No nausea, vomiting, fevers, chills, abdominal pain, diarrhea, constipation, melena, hematochezia, hematemesis, etc.  Appetite is excellent.  Weight is stable.  Activity tolerance is normal.  Overall she feels "great".  EGD revealed evidence of reflux esophagitis, small hiatal hernia, chronic gastritis, and duodenitis.  Colonoscopy revealed diverticulosis of the sigmoid colon.      Allergies & Meds:  Review of patient's allergies indicates:   Allergen Reactions    Lisinopril        Current Outpatient Medications   Medication Sig Dispense Refill    albuterol-ipratropium 2.5mg-0.5mg/3mL (DUO-NEB) 0.5 mg-3 mg(2.5 mg base)/3 mL nebulizer solution USE 1 VIAL VIA NEBULIZER FOUR TIMES DAILY 360 mL 9    amLODIPine (NORVASC) 5 MG tablet Take 1 tablet (5 mg total) by mouth once daily. 90 tablet 3    atorvastatin (LIPITOR) 10 MG tablet Take 1 tablet (10 mg total) by mouth once daily. 90 tablet 0    fluticasone (FLONASE) 50 mcg/actuation nasal spray 2 sprays (100 mcg total) by Each Nare route once daily. 3 Bottle 3    gabapentin (NEURONTIN) 800 MG tablet TAKE 1 TABLET THREE TIMES DAILY 270 tablet 1    metoprolol tartrate (LOPRESSOR) 50 MG tablet Take 1 tablet (50 mg total) by mouth 2 (two) times daily. 180 tablet 3    montelukast (SINGULAIR) 10 mg tablet TAKE 1 TABLET BY MOUTH EVERY DAY 90 tablet 1    pantoprazole (PROTONIX) 40 MG tablet TAKE 1 TABLET EVERY DAY 90 tablet 1    promethazine (PHENERGAN) 25 MG tablet Take 1 tablet (25 mg total) by mouth every 6 (six) hours as needed for Nausea. 30 tablet 0    tiotropium-olodaterol (STIOLTO RESPIMAT) 2.5-2.5 mcg/actuation Mist Inhale 2 puffs into the lungs once daily. Controller 12 g 3    albuterol 90 mcg/actuation inhaler " Inhale 2 puffs into the lungs every 6 (six) hours as needed for Wheezing. 18 g 11    sucralfate (CARAFATE) 1 gram tablet Take 1 tablet (1 g total) by mouth 4 (four) times daily. 120 tablet 11     No current facility-administered medications for this visit.        PMFSHx:    Past Medical History:   Diagnosis Date    Anxiety     Arthritis     osteoarthritis    Chronic pain     Chronic pain     COPD (chronic obstructive pulmonary disease)     GERD (gastroesophageal reflux disease)     History of stomach ulcers     Hypertension     IBS (irritable bowel syndrome)     [facility] Diagnosed 2004    Palpitations        Past Surgical History:   Procedure Laterality Date    APPENDECTOMY       SECTION      COLONOSCOPY      ? results in [place]    COLONOSCOPY  2015    Dr. Farfan   diverticulosis    COLONOSCOPY N/A 2020    Procedure: COLONOSCOPY;  Surgeon: Casey Farfan MD;  Location: [facility];  Service: General;  Laterality: N/A;    CYSTOSCOPY      ESOPHAGOGASTRODUODENOSCOPY N/A 2020    Procedure: ESOPHAGOGASTRODUODENOSCOPY (EGD);  Surgeon: Casey Farfan MD;  Location: [facility];  Service: General;  Laterality: N/A;  WITH BXS    HERNIA REPAIR      HYSTERECTOMY  9/3/2014    with bso    TOTAL ABDOMINAL HYSTERECTOMY W/ BILATERAL SALPINGOOPHORECTOMY Bilateral        Family History   Problem Relation Age of Onset    Hypertension Mother     Diabetes Sister     Colon cancer Maternal Grandmother     Breast cancer Other        Social History     Tobacco Use    Smoking status: Current Every Day Smoker     Packs/day: 0.50     Types: Cigarettes    Smokeless tobacco: Never Used   Substance Use Topics    Alcohol use: No    Drug use: No         Review of Systems   Constitutional: Negative for appetite change, chills, fatigue, fever and unexpected weight change.   HENT: Negative for congestion, dental problem, ear pain, mouth sores, postnasal drip, rhinorrhea, sore  throat, tinnitus, trouble swallowing and voice change.    Eyes: Negative for photophobia, pain, discharge and visual disturbance.   Respiratory: Negative for cough, chest tightness, shortness of breath and wheezing.    Cardiovascular: Negative for chest pain, palpitations and leg swelling.   Gastrointestinal: Negative for blood in stool, constipation, diarrhea, nausea and vomiting.   Endocrine: Negative for cold intolerance, heat intolerance, polydipsia, polyphagia and polyuria.   Genitourinary: Negative for difficulty urinating, dysuria, flank pain, frequency, hematuria and urgency.   Musculoskeletal: Negative for arthralgias, joint swelling and myalgias.   Skin: Negative for color change and rash.   Allergic/Immunologic: Negative for immunocompromised state.   Neurological: Negative for dizziness, tremors, seizures, syncope, speech difficulty, weakness, numbness and headaches.   Hematological: Negative for adenopathy. Does not bruise/bleed easily.   Psychiatric/Behavioral: Negative for agitation, confusion, hallucinations, self-injury and suicidal ideas. The patient is not nervous/anxious.        Objective:      Physical Exam   Constitutional: She is oriented to person, place, and time. Vital signs are normal. She appears well-developed and well-nourished.  Non-toxic appearance. She does not appear ill. No distress.   Body mass index is 24.55 kg/m².   HENT:   Head: Normocephalic and atraumatic.   Right Ear: Hearing and external ear normal.   Left Ear: Hearing and external ear normal.   Nose: Nose normal.   Eyes: Conjunctivae and lids are normal. No scleral icterus.   Neck: Trachea normal, normal range of motion and phonation normal. Neck supple. No JVD present. Carotid bruit is not present. No thyroid mass and no thyromegaly present.   Cardiovascular: Normal rate, regular rhythm and normal heart sounds. PMI is not displaced. Exam reveals no gallop.   No murmur heard.  Pulmonary/Chest: Effort normal and breath  sounds normal. No accessory muscle usage. No tachypnea. No respiratory distress.   Abdominal: Soft. Normal appearance and bowel sounds are normal. There is no tenderness. No hernia.   Lymphadenopathy:     She has no cervical adenopathy.     She has no axillary adenopathy.        Right: No inguinal and no supraclavicular adenopathy present.        Left: No inguinal and no supraclavicular adenopathy present.   Neurological: She is alert and oriented to person, place, and time. She is not disoriented. GCS eye subscore is 4. GCS verbal subscore is 5. GCS motor subscore is 6.   Skin: Skin is warm, dry and intact. No rash noted. No cyanosis. Nails show no clubbing.   Psychiatric: She has a normal mood and affect. Her speech is normal and behavior is normal. Judgment and thought content normal. She is not actively hallucinating. She is attentive.         Medical Records Review:  EGD, colonoscopy, and pathology reports reviewed from 1/24/2020.  EGD revealed a small hiatal hernia, evidence of esophagitis, evidence of gastritis, and evidence of duodenitis.  Findings were present despite ongoing therapy initiated well before the endoscopy with Protonix.  Gastric biopsies confirmed chronic Helicobacter negative gastritis.  Duodenal biopsies unremarkable.  Esophageal biopsies revealed severe acute and chronic esophagitis with intestinal metaplasia, no dysplasia.  Colonoscopy revealed sigmoid diverticulosis only.    Gallbladder ultrasound report reviewed from 11/23/2019.  Gallbladder was distended but without evidence of cholelithiasis, cholecystitis, or choledocholithiasis; no dilated bile ducts.  There was a positive sonographic Ch sign present.  HIDA scan films and report reviewed from 4/1/2015.  Ejection fraction was calculated at 92%.      Assessment:       Right upper quadrant abdominal pain. Gastritis and reflux esophagitis despite Protonix therapy.  Cannot exclude biliary disease.  Yousif's esophagus. Sigmoid  diverticulosis.  Further evaluation for biliary disease is warranted with a HIDA scan.  Addition of Carafate along with Protonix may provide improved therapy for reflux disease.    1. RUQ pain    2. Yousif's esophagus without dysplasia    3. Hiatal hernia    4. Gastroesophageal reflux disease with esophagitis    5. Other chronic gastritis without hemorrhage    6. Diverticulosis of colon          Plan:     RUQ pain  -     NM Hepatobiliary (HIDA) [facility] and [facility]; Future; Expected date: 02/06/2020    Yousif's esophagus without dysplasia    Hiatal hernia    Gastroesophageal reflux disease with esophagitis    Other chronic gastritis without hemorrhage    Diverticulosis of colon    Other orders  -     sucralfate (CARAFATE) 1 gram tablet; Take 1 tablet (1 g total) by mouth 4 (four) times daily.  Dispense: 120 tablet; Refill: 11          Follow up in about 2 weeks (around 2/20/2020) for results.    Counseling/Medical Decision Making:  Janina Klein and I had a long conversation regarding the diagnosis of diverticulosis and diverticulitis. The [employer] publication pertaining to the subject was provided to her as well. The complications of diverticulosis including diverticulitis and hemorrhage were discussed in great detail. The signs and symptoms of these complications were explained explicitly including but not limited to: bleeding, abdominal pain, fever, nausea, vomiting, diarrhea, constipation, etc. She was instructed to seek immediate medical attention should any of the signs or symptoms develop. The importance of avoiding constipation was explained. The benefits of a 40 g high fiber diet, fiber supplements, stool softeners, and increased free water intake were also explained. Questions were solicited and answered. Entire conversation was held in layman's terms. At the conclusion of the conversation she voiced complete understanding of all we discussed and satisfaction that all questions were fully answered.    MsJose Luis  Ernie was also counseled regarding the findings of Yousif's esophagus and persistent reflux disease.  The pre malignant potential of Yousif's was discussed.  Recommendations were expressed for repeat EGD in 3 years.    Repeat screening colonoscopy recommended in 10 years.

## 2020-02-27 ENCOUNTER — HOSPITAL ENCOUNTER (OUTPATIENT)
Dept: RADIOLOGY | Facility: HOSPITAL | Age: 60
Discharge: HOME | End: 2020-02-27
Attending: SURGERY
Payer: MEDICARE

## 2020-02-27 DIAGNOSIS — R10.11 RUQ PAIN: ICD-10-CM

## 2020-02-27 PROCEDURE — A9537 TC99M MEBROFENIN: HCPCS

## 2020-02-27 PROCEDURE — 78227 NM HEPATOBILIARY(HIDA) WITH PHARM AND EF: ICD-10-PCS | Mod: 26,,, | Performed by: RADIOLOGY

## 2020-02-27 PROCEDURE — 78227 HEPATOBIL SYST IMAGE W/DRUG: CPT | Mod: 26,,, | Performed by: RADIOLOGY

## 2020-03-16 ENCOUNTER — OFFICE VISIT (OUTPATIENT)
Dept: SURGERY | Facility: CLINIC | Age: 60
End: 2020-03-16
Payer: MEDICARE

## 2020-03-16 VITALS
WEIGHT: 141 LBS | DIASTOLIC BLOOD PRESSURE: 109 MMHG | RESPIRATION RATE: 14 BRPM | HEART RATE: 88 BPM | SYSTOLIC BLOOD PRESSURE: 159 MMHG | TEMPERATURE: 98 F | BODY MASS INDEX: 24.07 KG/M2 | OXYGEN SATURATION: 97 % | HEIGHT: 64 IN

## 2020-03-16 DIAGNOSIS — K81.1 CHRONIC CHOLECYSTITIS: Primary | ICD-10-CM

## 2020-03-16 DIAGNOSIS — K22.70 BARRETT'S ESOPHAGUS WITHOUT DYSPLASIA: ICD-10-CM

## 2020-03-16 DIAGNOSIS — K57.30 DIVERTICULOSIS OF COLON: ICD-10-CM

## 2020-03-16 DIAGNOSIS — J44.9 CHRONIC OBSTRUCTIVE PULMONARY DISEASE, UNSPECIFIED COPD TYPE: ICD-10-CM

## 2020-03-16 DIAGNOSIS — I10 ESSENTIAL HYPERTENSION: ICD-10-CM

## 2020-03-16 DIAGNOSIS — K21.00 GASTROESOPHAGEAL REFLUX DISEASE WITH ESOPHAGITIS: ICD-10-CM

## 2020-03-16 DIAGNOSIS — K44.9 HIATAL HERNIA: ICD-10-CM

## 2020-03-16 PROCEDURE — 3080F PR MOST RECENT DIASTOLIC BLOOD PRESSURE >= 90 MM HG: ICD-10-PCS | Mod: CPTII,S$GLB,, | Performed by: SURGERY

## 2020-03-16 PROCEDURE — 3008F BODY MASS INDEX DOCD: CPT | Mod: CPTII,S$GLB,, | Performed by: SURGERY

## 2020-03-16 PROCEDURE — 3077F SYST BP >= 140 MM HG: CPT | Mod: CPTII,S$GLB,, | Performed by: SURGERY

## 2020-03-16 PROCEDURE — 99215 OFFICE O/P EST HI 40 MIN: CPT | Mod: S$GLB,,, | Performed by: SURGERY

## 2020-03-16 PROCEDURE — 3080F DIAST BP >= 90 MM HG: CPT | Mod: CPTII,S$GLB,, | Performed by: SURGERY

## 2020-03-16 PROCEDURE — 3077F PR MOST RECENT SYSTOLIC BLOOD PRESSURE >= 140 MM HG: ICD-10-PCS | Mod: CPTII,S$GLB,, | Performed by: SURGERY

## 2020-03-16 PROCEDURE — 99215 PR OFFICE/OUTPT VISIT, EST, LEVL V, 40-54 MIN: ICD-10-PCS | Mod: S$GLB,,, | Performed by: SURGERY

## 2020-03-16 PROCEDURE — 3008F PR BODY MASS INDEX (BMI) DOCUMENTED: ICD-10-PCS | Mod: CPTII,S$GLB,, | Performed by: SURGERY

## 2020-03-16 RX ORDER — LIDOCAINE HYDROCHLORIDE 10 MG/ML
1 INJECTION, SOLUTION EPIDURAL; INFILTRATION; INTRACAUDAL; PERINEURAL ONCE
Status: CANCELLED | OUTPATIENT
Start: 2020-03-16 | End: 2020-03-16

## 2020-03-16 RX ORDER — SODIUM CHLORIDE, SODIUM LACTATE, POTASSIUM CHLORIDE, CALCIUM CHLORIDE 600; 310; 30; 20 MG/100ML; MG/100ML; MG/100ML; MG/100ML
INJECTION, SOLUTION INTRAVENOUS CONTINUOUS
Status: CANCELLED | OUTPATIENT
Start: 2020-03-16

## 2020-03-16 NOTE — PROGRESS NOTES
Subjective:       Patient ID: Janina Klein     Chief Complaint:  Follow-up (HIDA 2/27/20)      HPI:  Ms. Klein returns today with complaints of postprandial right upper quadrant abdominal discomfort that radiates towards the right chest and upper midline back.  Pain is associated nausea but no vomiting.  No fever or chills.  No jaundice, biliuria, acholia, diarrhea, constipation, melena, hematochezia, hematemesis, etc.  No other aggravating factors.  No alleviating factors.  No other associated symptoms.  In the interval since her last office visit 2/6/2020 she had a HIDA scan with CCK.  She was highly symptomatic with symptoms reproduced identically upon injection with the CCK.  Previous evaluation has included an EGD, colonoscopy, ultrasound, and a HIDA scan in 2015.  The HIDA scan she had in 2015 revealed an elevated ejection fraction.  She was entirely asymptomatic during the CCK portion of that HIDA scan.      Allergies & Meds:  Review of patient's allergies indicates:   Allergen Reactions    Lisinopril        Current Outpatient Medications   Medication Sig Dispense Refill    amLODIPine (NORVASC) 5 MG tablet TAKE 1 TABLET EVERY DAY 90 tablet 3    atorvastatin (LIPITOR) 10 MG tablet Take 1 tablet (10 mg total) by mouth once daily. 90 tablet 0    gabapentin (NEURONTIN) 800 MG tablet TAKE 1 TABLET THREE TIMES DAILY 270 tablet 1    metoprolol tartrate (LOPRESSOR) 50 MG tablet TAKE 1 TABLET TWICE DAILY 180 tablet 3    montelukast (SINGULAIR) 10 mg tablet TAKE 1 TABLET BY MOUTH EVERY DAY 90 tablet 1    pantoprazole (PROTONIX) 40 MG tablet TAKE 1 TABLET EVERY DAY 90 tablet 1    promethazine (PHENERGAN) 25 MG tablet Take 1 tablet (25 mg total) by mouth every 6 (six) hours as needed for Nausea. 30 tablet 0    sucralfate (CARAFATE) 1 gram tablet Take 1 tablet (1 g total) by mouth 4 (four) times daily. 120 tablet 11    tiotropium-olodaterol (STIOLTO RESPIMAT) 2.5-2.5 mcg/actuation Mist Inhale 2 puffs into the  lungs once daily. Controller 12 g 3    albuterol 90 mcg/actuation inhaler Inhale 2 puffs into the lungs every 6 (six) hours as needed for Wheezing. 18 g 11    albuterol-ipratropium 2.5mg-0.5mg/3mL (DUO-NEB) 0.5 mg-3 mg(2.5 mg base)/3 mL nebulizer solution USE 1 VIAL VIA NEBULIZER FOUR TIMES DAILY (Patient not taking: Reported on 3/16/2020) 360 mL 9    fluticasone (FLONASE) 50 mcg/actuation nasal spray 2 sprays (100 mcg total) by Each Nare route once daily. (Patient not taking: Reported on 3/16/2020) 3 Bottle 3     No current facility-administered medications for this visit.        PMFSHx:  Past Medical History:   Diagnosis Date    Anxiety     Arthritis     osteoarthritis    Chronic pain     Chronic pain     COPD (chronic obstructive pulmonary disease)     GERD (gastroesophageal reflux disease)     History of stomach ulcers     Hypertension     IBS (irritable bowel syndrome)     [facility] 2004    Palpitations        Past Surgical History:   Procedure Laterality Date    APPENDECTOMY       SECTION      COLONOSCOPY      ? results in [place]    COLONOSCOPY  2015    Dr. Farfan   diverticulosis    COLONOSCOPY N/A 2020    Procedure: COLONOSCOPY;  Surgeon: Casey Farfan MD;  Location: [facility];  Service: General;  Laterality: N/A;    CYSTOSCOPY      ESOPHAGOGASTRODUODENOSCOPY N/A 2020    Procedure: ESOPHAGOGASTRODUODENOSCOPY (EGD);  Surgeon: Casey Farfan MD;  Location: [facility];  Service: General;  Laterality: N/A;  WITH BXS    HERNIA REPAIR      HYSTERECTOMY  9/3/2014    with bso    TOTAL ABDOMINAL HYSTERECTOMY W/ BILATERAL SALPINGOOPHORECTOMY Bilateral        Family History   Problem Relation Age of Onset    Hypertension Mother     Diabetes Sister     Colon cancer Maternal Grandmother     Breast cancer Other        Social History     Tobacco Use    Smoking status: Current Every Day Smoker     Packs/day: 0.50     Types: Cigarettes     Smokeless tobacco: Never Used   Substance Use Topics    Alcohol use: No    Drug use: No       Review of Systems   Constitutional: Negative for appetite change, chills, fatigue, fever and unexpected weight change.   HENT: Negative for congestion, dental problem, ear pain, mouth sores, postnasal drip, rhinorrhea, sore throat, tinnitus, trouble swallowing and voice change.    Eyes: Negative for photophobia, pain, discharge and visual disturbance.   Respiratory: Negative for cough, chest tightness, shortness of breath and wheezing.    Cardiovascular: Negative for chest pain, palpitations and leg swelling.   Gastrointestinal: Negative for blood in stool, constipation, diarrhea and vomiting.   Endocrine: Negative for cold intolerance, heat intolerance, polydipsia, polyphagia and polyuria.   Genitourinary: Negative for difficulty urinating, dysuria, flank pain, frequency, hematuria and urgency.   Musculoskeletal: Negative for arthralgias, joint swelling and myalgias.   Skin: Negative for color change and rash.   Allergic/Immunologic: Negative for immunocompromised state.   Neurological: Negative for dizziness, tremors, seizures, syncope, speech difficulty, weakness, numbness and headaches.   Hematological: Negative for adenopathy. Does not bruise/bleed easily.   Psychiatric/Behavioral: Negative for agitation, confusion, hallucinations, self-injury and suicidal ideas. The patient is not nervous/anxious.             Physical Exam   Constitutional: She is oriented to person, place, and time. She appears well-developed and well-nourished. She is active.  Non-toxic appearance. No distress.   Body mass index is 24.2 kg/m².     HENT:   Head: Normocephalic and atraumatic.   Right Ear: Hearing and external ear normal. No drainage or tenderness.   Left Ear: Hearing and external ear normal. No drainage or tenderness.   Nose: Nose normal. No rhinorrhea. No epistaxis.   Mouth/Throat: Uvula is midline, oropharynx is clear and moist  and mucous membranes are normal. Mucous membranes are not pale, not dry and not cyanotic. No oropharyngeal exudate.   Eyes: Pupils are equal, round, and reactive to light. Conjunctivae and lids are normal. Right eye exhibits no discharge and no exudate. Left eye exhibits no discharge and no exudate. Right conjunctiva is not injected. Right eye exhibits no nystagmus. Left eye exhibits no nystagmus.   Neck: Trachea normal, full passive range of motion without pain and phonation normal. No JVD present. Carotid bruit is not present. No tracheal deviation present. No thyroid mass and no thyromegaly present.   Cardiovascular: Normal rate, regular rhythm, S1 normal, S2 normal, normal heart sounds and intact distal pulses. PMI is not displaced. Exam reveals no gallop and no friction rub.   No murmur heard.  Pulmonary/Chest: Effort normal and breath sounds normal. No accessory muscle usage. No respiratory distress. She exhibits no mass, no tenderness and no crepitus. Right breast exhibits no inverted nipple, no mass, no nipple discharge, no skin change and no tenderness. Left breast exhibits no inverted nipple, no mass, no nipple discharge, no skin change and no tenderness. Breasts are symmetrical.   Abdominal: Soft. Normal appearance and bowel sounds are normal. She exhibits no distension, no fluid wave, no abdominal bruit and no mass. There is no hepatosplenomegaly. There is no tenderness. There is no rebound, no guarding and negative Ch's sign. No hernia. Hernia confirmed negative in the right inguinal area and confirmed negative in the left inguinal area.   Genitourinary: Rectum normal and vagina normal. There is no rash or lesion on the right labia. There is no rash or lesion on the left labia. Right adnexum displays no mass. Left adnexum displays no mass. No vaginal discharge found.   Musculoskeletal:        Cervical back: Normal.        Thoracic back: Normal.        Lumbar back: Normal.        Right upper arm:  Normal.        Left upper arm: Normal.        Right forearm: Normal.        Left forearm: Normal.        Right hand: Normal.        Left hand: Normal.        Right upper leg: Normal.        Left upper leg: Normal.        Right lower leg: Normal.        Left lower leg: Normal.        Right foot: Normal.        Left foot: Normal.   Lymphadenopathy:        Head (right side): No submental, no submandibular and no posterior auricular adenopathy present.        Head (left side): No submental, no submandibular and no posterior auricular adenopathy present.     She has no cervical adenopathy.     She has no axillary adenopathy.        Right: No inguinal and no supraclavicular adenopathy present.        Left: No inguinal and no supraclavicular adenopathy present.   Neurological: She is alert and oriented to person, place, and time. She has normal strength. No cranial nerve deficit or sensory deficit. Coordination and gait normal. GCS eye subscore is 4. GCS verbal subscore is 5. GCS motor subscore is 6.   Skin: Skin is warm, dry and intact. No rash noted. No cyanosis. Nails show no clubbing.   Psychiatric: She has a normal mood and affect. Her speech is normal. Judgment and thought content normal. Her mood appears not anxious. Her affect is not inappropriate. She is not actively hallucinating. She does not exhibit a depressed mood.           Medical Records Review:  HIDA scan films and report reviewed from 2/27/2020.  No evidence of ductal obstruction.  Ejection fraction 59%.  As noted above she was highly symptomatic during the CCK portion of the exam was symptoms identical to her presenting complaints.    EGD, colonoscopy, and pathology reports reviewed from 1/24/2020.  EGD revealed a small hiatal hernia, evidence of esophagitis, evidence of gastritis, and evidence of duodenitis.  Findings were present despite ongoing therapy initiated well before the endoscopy with Protonix.  Gastric biopsies confirmed chronic Helicobacter  negative gastritis.  Duodenal biopsies unremarkable.  Esophageal biopsies revealed severe acute and chronic esophagitis with intestinal metaplasia, no dysplasia.  Colonoscopy revealed sigmoid diverticulosis only.    Gallbladder ultrasound report reviewed from 11/23/2019.  Gallbladder was distended but without evidence of cholelithiasis, cholecystitis, or choledocholithiasis; no dilated bile ducts.  There was a positive sonographic Ch sign present.  HIDA scan films and report reviewed from 4/1/2015.  Ejection fraction was calculated at 92%.    Assessment:       Abnormal HIDA scan.  Probable chronic cholecystitis.  Right upper quadrant abdominal pain. Differential diagnosis includes but is not limited to reflux disease, esophagitis, ulcer disease, gastritis, ampullary dysfunction, biliary tract disease, gastrointestinal neoplasm, etc.  Options at this time include but not limited to endoscopy, second opinion, radiologic studies, laparoscopy, empiric therapy, symptomatic therapy, etc.  Multiple comorbid issues ( COPD, hypertension, hyperlipidemia, hiatal hernia, reflux disease, Yousif's esophagus ) increase the complexity of required perioperative evaluation & management, risks of complications, and likely will increase the difficulty and complexity of postoperative care, etc.  These issues must be factored in to preoperative evaluation and perioperative management.    1. Chronic cholecystitis    2. Chronic obstructive pulmonary disease, unspecified COPD type    3. Essential hypertension    4. Gastroesophageal reflux disease with esophagitis    5. Yousif's esophagus without dysplasia    6. Hiatal hernia    7. Diverticulosis of colon          Plan:     Chronic cholecystitis  -     Case Request Operating Room: CHOLECYSTECTOMY-LAPAROSCOPIC  -     Comprehensive metabolic panel; Future; Expected date: 03/16/2020  -     Amylase; Future; Expected date: 03/16/2020  -     Lipase; Future; Expected date: 03/16/2020  -      CBC auto differential; Future; Expected date: 03/16/2020  -     EKG 12-lead; Future    Chronic obstructive pulmonary disease, unspecified COPD type    Essential hypertension    Gastroesophageal reflux disease with esophagitis    Yousif's esophagus without dysplasia    Hiatal hernia    Diverticulosis of colon        Follow up in about 6 weeks (around 4/27/2020) for routine postop evaluation.    Counseling/Medical Decision Making:  Janina Klein was counseled regarding the results of the evaluation thus far and the differential diagnosis including but not limited to: cholelithiasis, cholecystitis, choledocholithiasis, gastritis, duodenitis, ulcer disease, reflux disease, Sphincter of Oddi Dysfunction, pancreatitis, irritable bowel syndrome, inflammatory bowel disease, diverticular disease, neoplastic disease, infectious disease, ischemic disease, etc.  We also discussed all diagnostic and therapeutic options as well as the risks, benefits, possible complications, details of, and indications for each option.  Options discussed included but were not limited to: endoscopy, laparoscopic cholecystectomy, conventional cholecystectomy, radiologic studies, second opinion, observation, empiric therapy, symptomatic therapy, stone dissolving therapy, lithotripsy, referral elsewhere for treatment, etc.  Possible complications of laparoscopic surgery discussed included but were not limited to: bleeding, infections, scars, chronic pain, nerve damage, internal injuries, bile duct injuries, inability to complete the operation laparoscopically / need to convert to an open technique, retained stones, persistent symptoms, need for additional operations or procedures, chronic diarrhea, weight gain, weight loss, etc.  Questions were solicited and answered.  [employer] publications were provided regarding endoscopy, gallbladder disease, laparoscopic surgery, etc.  I read the entire consent form to her verbatim. A copy of the consent form was  provided for her review outside the office and hospital prior to the procedure.  Entire conversation was held in laymans terms.  All unfamiliar terms defined.  At the conclusion of the conversation she voiced complete understanding of all we discussed, satisfaction that all questions were answered, and that she felt fully informed and completely capable of making an informed decision.  She requests and desires to proceed with an attempted laparoscopic cholecystectomy.  She clearly understands the distinct possibility of persistent symptoms following surgery.    Total face-to-face encounter time was 60 minutes, 40 minutes spent counseling as outlined/summarized above.

## 2020-03-16 NOTE — H&P
[facility] - General Surgery  General Surgery  H&P      Patient Name: Janina Klein  MRN: [id number]       Chief Complaint:  I am having my gallbladder removed      HPI:  Ms. Klein returns today with complaints of postprandial right upper quadrant abdominal discomfort that radiates towards the right chest and upper midline back.  Pain is associated nausea but no vomiting.  No fever or chills.  No jaundice, biliuria, acholia, diarrhea, constipation, melena, hematochezia, hematemesis, etc.  No other aggravating factors.  No alleviating factors.  No other associated symptoms.  In the interval since her last office visit 2/6/2020 she had a HIDA scan with CCK.  She was highly symptomatic with symptoms reproduced identically upon injection with the CCK.  Previous evaluation has included an EGD, colonoscopy, ultrasound, and a HIDA scan in 2015.  The HIDA scan she had in 2015 revealed an elevated ejection fraction.  She was entirely asymptomatic during the CCK portion of that HIDA scan.      Allergies & Meds:     Allergen Reactions    Lisinopril        Current Outpatient Medications   Medication Sig Dispense Refill    amLODIPine (NORVASC) 5 MG tablet TAKE 1 TABLET EVERY DAY 90 tablet 3    atorvastatin (LIPITOR) 10 MG tablet Take 1 tablet (10 mg total) by mouth once daily. 90 tablet 0    gabapentin (NEURONTIN) 800 MG tablet TAKE 1 TABLET THREE TIMES DAILY 270 tablet 1    metoprolol tartrate (LOPRESSOR) 50 MG tablet TAKE 1 TABLET TWICE DAILY 180 tablet 3    montelukast (SINGULAIR) 10 mg tablet TAKE 1 TABLET BY MOUTH EVERY DAY 90 tablet 1    pantoprazole (PROTONIX) 40 MG tablet TAKE 1 TABLET EVERY DAY 90 tablet 1    promethazine (PHENERGAN) 25 MG tablet Take 1 tablet (25 mg total) by mouth every 6 (six) hours as needed for Nausea. 30 tablet 0    sucralfate (CARAFATE) 1 gram tablet Take 1 tablet (1 g total) by mouth 4 (four) times daily. 120 tablet 11    tiotropium-olodaterol (STIOLTO  RESPIMAT) 2.5-2.5 mcg/actuation Mist Inhale 2 puffs into the lungs once daily. Controller 12 g 3    albuterol 90 mcg/actuation inhaler Inhale 2 puffs into the lungs every 6 (six) hours as needed for Wheezing. 18 g 11    albuterol-ipratropium 2.5mg-0.5mg/3mL (DUO-NEB) 0.5 mg-3 mg(2.5 mg base)/3 mL nebulizer solution USE 1 VIAL VIA NEBULIZER FOUR TIMES DAILY (Patient not taking: Reported on 3/16/2020) 360 mL 9    fluticasone (FLONASE) 50 mcg/actuation nasal spray 2 sprays (100 mcg total) by Each Nare route once daily. (Patient not taking: Reported on 3/16/2020) 3 Bottle 3         PMFSHx:  Past Medical History:   Diagnosis Date    Anxiety     Arthritis     osteoarthritis    Chronic pain     Chronic pain     COPD (chronic obstructive pulmonary disease)     GERD (gastroesophageal reflux disease)     History of stomach ulcers     Hypertension     IBS (irritable bowel syndrome)     [facility] Diagnosed 2004    Palpitations        Past Surgical History:   Procedure Laterality Date    APPENDECTOMY       SECTION      COLONOSCOPY      ? results in [place]    COLONOSCOPY  2015    Dr. Farfan   diverticulosis    COLONOSCOPY N/A 2020    Procedure: COLONOSCOPY;  Surgeon: Casey Farfan MD;  Location: [facility];  Service: General;  Laterality: N/A;    CYSTOSCOPY      ESOPHAGOGASTRODUODENOSCOPY N/A 2020    Procedure: ESOPHAGOGASTRODUODENOSCOPY (EGD);  Surgeon: Casey Farfan MD;  Location: [facility];  Service: General;  Laterality: N/A;  WITH BXS    HERNIA REPAIR      HYSTERECTOMY  9/3/2014    with bso    TOTAL ABDOMINAL HYSTERECTOMY W/ BILATERAL SALPINGOOPHORECTOMY Bilateral        Family History   Problem Relation Age of Onset    Hypertension Mother     Diabetes Sister     Colon cancer Maternal Grandmother     Breast cancer Other        Social History     Tobacco Use    Smoking status: Current Every Day Smoker     Packs/day: 0.50     Types: Cigarettes     Smokeless tobacco: Never Used   Substance Use Topics    Alcohol use: No    Drug use: No       Review of Systems   Constitutional: Negative for appetite change, chills, fatigue, fever and unexpected weight change.   HENT: Negative for congestion, dental problem, ear pain, mouth sores, postnasal drip, rhinorrhea, sore throat, tinnitus, trouble swallowing and voice change.    Eyes: Negative for photophobia, pain, discharge and visual disturbance.   Respiratory: Negative for cough, chest tightness, shortness of breath and wheezing.    Cardiovascular: Negative for chest pain, palpitations and leg swelling.   Gastrointestinal: Negative for blood in stool, constipation, diarrhea and vomiting.   Endocrine: Negative for cold intolerance, heat intolerance, polydipsia, polyphagia and polyuria.   Genitourinary: Negative for difficulty urinating, dysuria, flank pain, frequency, hematuria and urgency.   Musculoskeletal: Negative for arthralgias, joint swelling and myalgias.   Skin: Negative for color change and rash.   Allergic/Immunologic: Negative for immunocompromised state.   Neurological: Negative for dizziness, tremors, seizures, syncope, speech difficulty, weakness, numbness and headaches.   Hematological: Negative for adenopathy. Does not bruise/bleed easily.   Psychiatric/Behavioral: Negative for agitation, confusion, hallucinations, self-injury and suicidal ideas. The patient is not nervous/anxious.             Physical Exam   Constitutional: She is oriented to person, place, and time. She appears well-developed and well-nourished. She is active.  Non-toxic appearance. No distress.   Body mass index is 24.2 kg/m².   HENT: Head: Normocephalic and atraumatic.   Right Ear: Hearing and external ear normal. No drainage or tenderness.   Left Ear: Hearing and external ear normal. No drainage or tenderness.   Nose: Nose normal. No rhinorrhea. No epistaxis.   Mouth/Throat: Uvula is midline, oropharynx is clear and moist and  mucous membranes are normal. Mucous membranes are not pale, not dry and not cyanotic. No oropharyngeal exudate.   Eyes: Pupils are equal, round, and reactive to light. Conjunctivae and lids are normal. Right eye exhibits no discharge and no exudate. Left eye exhibits no discharge and no exudate. Right conjunctiva is not injected. Right eye exhibits no nystagmus. Left eye exhibits no nystagmus.   Neck: Trachea normal, full passive range of motion without pain and phonation normal. No JVD present. Carotid bruit is not present. No tracheal deviation present. No thyroid mass and no thyromegaly present.   Cardiovascular: Normal rate, regular rhythm, S1 normal, S2 normal, normal heart sounds and intact distal pulses. PMI is not displaced. Exam reveals no gallop and no friction rub.   No murmur heard.  Pulmonary/Chest: Effort normal and breath sounds normal. No accessory muscle usage. No respiratory distress. She exhibits no mass, no tenderness and no crepitus. Right breast exhibits no inverted nipple, no mass, no nipple discharge, no skin change and no tenderness. Left breast exhibits no inverted nipple, no mass, no nipple discharge, no skin change and no tenderness. Breasts are symmetrical.   Abdominal: Soft. Normal appearance and bowel sounds are normal. She exhibits no distension, no fluid wave, no abdominal bruit and no mass. There is no hepatosplenomegaly. There is no tenderness. There is no rebound, no guarding and negative Ch's sign. No hernia. Hernia confirmed negative in the right inguinal area and confirmed negative in the left inguinal area.   Genitourinary: Rectum normal and vagina normal. There is no rash or lesion on the right labia. There is no rash or lesion on the left labia. Right adnexum displays no mass. Left adnexum displays no mass. No vaginal discharge found.   Musculoskeletal:        Cervical back: Normal.        Thoracic back: Normal.        Lumbar back: Normal.        Right upper arm: Normal.         Left upper arm: Normal.        Right forearm: Normal.        Left forearm: Normal.        Right hand: Normal.        Left hand: Normal.        Right upper leg: Normal.        Left upper leg: Normal.        Right lower leg: Normal.        Left lower leg: Normal.        Right foot: Normal.        Left foot: Normal.   Lymphadenopathy:        Head (right side): No submental, no submandibular and no posterior auricular adenopathy present.        Head (left side): No submental, no submandibular and no posterior auricular adenopathy present.     She has no cervical adenopathy.     She has no axillary adenopathy.        Right: No inguinal and no supraclavicular adenopathy present.        Left: No inguinal and no supraclavicular adenopathy present.   Neurological: She is alert and oriented to person, place, and time. She has normal strength. No cranial nerve deficit or sensory deficit. Coordination and gait normal. GCS eye subscore is 4. GCS verbal subscore is 5. GCS motor subscore is 6.   Skin: Skin is warm, dry and intact. No rash noted. No cyanosis. Nails show no clubbing.   Psychiatric: She has a normal mood and affect. Her speech is normal. Judgment and thought content normal. Her mood appears not anxious. Her affect is not inappropriate. She is not actively hallucinating. She does not exhibit a depressed mood.           Medical Records Review:  HIDA scan films and report reviewed from 2/27/2020.  No evidence of ductal obstruction.  Ejection fraction 59%.  As noted above she was highly symptomatic during the CCK portion of the exam was symptoms identical to her presenting complaints.    EGD, colonoscopy, and pathology reports reviewed from 1/24/2020.  EGD revealed a small hiatal hernia, evidence of esophagitis, evidence of gastritis, and evidence of duodenitis.  Findings were present despite ongoing therapy initiated well before the endoscopy with Protonix.  Gastric biopsies confirmed chronic Helicobacter  negative gastritis.  Duodenal biopsies unremarkable.  Esophageal biopsies revealed severe acute and chronic esophagitis with intestinal metaplasia, no dysplasia.  Colonoscopy revealed sigmoid diverticulosis only.    Gallbladder ultrasound report reviewed from 11/23/2019.  Gallbladder was distended but without evidence of cholelithiasis, cholecystitis, or choledocholithiasis; no dilated bile ducts.  There was a positive sonographic Hc sign present.  HIDA scan films and report reviewed from 4/1/2015.  Ejection fraction was calculated at 92%.    Assessment:       Abnormal HIDA scan.  Probable chronic cholecystitis.  Right upper quadrant abdominal pain. Differential diagnosis includes but is not limited to reflux disease, esophagitis, ulcer disease, gastritis, ampullary dysfunction, biliary tract disease, gastrointestinal neoplasm, etc.  Options at this time include but not limited to endoscopy, second opinion, radiologic studies, laparoscopy, empiric therapy, symptomatic therapy, etc.  Multiple comorbid issues ( COPD, hypertension, hyperlipidemia, hiatal hernia, reflux disease, Yousif's esophagus ) increase the complexity of required perioperative evaluation & management, risks of complications, and likely will increase the difficulty and complexity of postoperative care, etc.  These issues must be factored in to preoperative evaluation and perioperative management.    1. Chronic cholecystitis    2. Chronic obstructive pulmonary disease, unspecified COPD type    3. Essential hypertension    4. Gastroesophageal reflux disease with esophagitis    5. Yousif's esophagus without dysplasia    6. Hiatal hernia    7. Diverticulosis of colon          Plan:     Chronic cholecystitis  -     Case Request Operating Room: CHOLECYSTECTOMY-LAPAROSCOPIC  -     Comprehensive metabolic panel; Future; Expected date: 03/16/2020  -     Amylase; Future; Expected date: 03/16/2020  -     Lipase; Future; Expected date: 03/16/2020  -      CBC auto differential; Future; Expected date: 03/16/2020  -     EKG 12-lead; Future    Chronic obstructive pulmonary disease, unspecified COPD type    Essential hypertension    Gastroesophageal reflux disease with esophagitis    Yousif's esophagus without dysplasia    Hiatal hernia    Diverticulosis of colon        Follow up around 4/27/2020 for routine postop evaluation.    Counseling/Medical Decision Making:  Janina Klein was counseled regarding the results of the evaluation thus far and the differential diagnosis including but not limited to: cholelithiasis, cholecystitis, choledocholithiasis, gastritis, duodenitis, ulcer disease, reflux disease, Sphincter of Oddi Dysfunction, pancreatitis, irritable bowel syndrome, inflammatory bowel disease, diverticular disease, neoplastic disease, infectious disease, ischemic disease, etc.  We also discussed all diagnostic and therapeutic options as well as the risks, benefits, possible complications, details of, and indications for each option.  Options discussed included but were not limited to: endoscopy, laparoscopic cholecystectomy, conventional cholecystectomy, radiologic studies, second opinion, observation, empiric therapy, symptomatic therapy, stone dissolving therapy, lithotripsy, referral elsewhere for treatment, etc.  Possible complications of laparoscopic surgery discussed included but were not limited to: bleeding, infections, scars, chronic pain, nerve damage, internal injuries, bile duct injuries, inability to complete the operation laparoscopically / need to convert to an open technique, retained stones, persistent symptoms, need for additional operations or procedures, chronic diarrhea, weight gain, weight loss, etc.  Questions were solicited and answered.  [employer]bames publications were provided regarding endoscopy, gallbladder disease, laparoscopic surgery, etc.  I read the entire consent form to her verbatim. A copy of the consent form was provided for her  review outside the office and hospital prior to the procedure.  Entire conversation was held in laymans terms.  All unfamiliar terms defined.  At the conclusion of the conversation she voiced complete understanding of all we discussed, satisfaction that all questions were answered, and that she felt fully informed and completely capable of making an informed decision.  She requests and desires to proceed with an attempted laparoscopic cholecystectomy.  She fully understands the distinct possibility of persistent symptoms following surgery.

## 2020-03-18 ENCOUNTER — PATIENT MESSAGE (OUTPATIENT)
Dept: SURGERY | Facility: CLINIC | Age: 60
End: 2020-03-18

## 2020-04-29 ENCOUNTER — TELEPHONE (OUTPATIENT)
Dept: SURGERY | Facility: CLINIC | Age: 60
End: 2020-04-29

## 2020-04-29 ENCOUNTER — PATIENT MESSAGE (OUTPATIENT)
Dept: SURGERY | Facility: CLINIC | Age: 60
End: 2020-04-29

## 2020-04-29 PROBLEM — E78.1 HIGH TRIGLYCERIDES: Status: RESOLVED | Noted: 2017-04-19 | Resolved: 2020-04-29

## 2020-04-29 NOTE — TELEPHONE ENCOUNTER
Writer spoke to patient and let her know that we are getting daily updates from our surgical staff and we will call her when we can start rescheduling all procedures. Patient expressed verbal understanding.

## 2020-04-29 NOTE — TELEPHONE ENCOUNTER
----- Message from Jennifer Gray sent at 4/29/2020  9:44 AM CDT -----  Contact: patient  Type: Needs Medical Advice  Who Called:  patient  Symptoms (please be specific):    How long has patient had these symptoms:    Pharmacy name and phone #:    Best Call Back Number:   Additional Information: requesting a call back to reschedule surgery.

## 2020-05-07 ENCOUNTER — PATIENT MESSAGE (OUTPATIENT)
Dept: SURGERY | Facility: CLINIC | Age: 60
End: 2020-05-07

## 2020-05-12 ENCOUNTER — PATIENT MESSAGE (OUTPATIENT)
Dept: SURGERY | Facility: CLINIC | Age: 60
End: 2020-05-12

## 2020-05-12 DIAGNOSIS — R10.9 ABDOMINAL PAIN, UNSPECIFIED ABDOMINAL LOCATION: Primary | ICD-10-CM

## 2020-05-12 RX ORDER — DICYCLOMINE HYDROCHLORIDE 10 MG/1
10 CAPSULE ORAL
Qty: 120 CAPSULE | Refills: 0 | Status: SHIPPED | OUTPATIENT
Start: 2020-05-12 | End: 2020-06-11

## 2020-05-13 ENCOUNTER — PATIENT MESSAGE (OUTPATIENT)
Dept: SURGERY | Facility: CLINIC | Age: 60
End: 2020-05-13

## 2020-05-13 DIAGNOSIS — K81.1 CHRONIC CHOLECYSTITIS: Primary | ICD-10-CM

## 2020-05-13 NOTE — PROGRESS NOTES
Ms. Klein has requested a prescription for Bentyl.  Previously this was very effective for her by her report with crampy abdominal pain.  Pending cholecystectomy.

## 2020-05-19 ENCOUNTER — PATIENT MESSAGE (OUTPATIENT)
Dept: SURGERY | Facility: CLINIC | Age: 60
End: 2020-05-19

## 2020-05-20 ENCOUNTER — TELEPHONE (OUTPATIENT)
Dept: SURGERY | Facility: CLINIC | Age: 60
End: 2020-05-20

## 2020-05-20 ENCOUNTER — PATIENT MESSAGE (OUTPATIENT)
Dept: SURGERY | Facility: HOSPITAL | Age: 60
End: 2020-05-20

## 2020-05-20 NOTE — TELEPHONE ENCOUNTER
----- Message from Dasha Blackwell sent at 5/20/2020  9:16 AM CDT -----  Contact: self  Type:  Patient Returning Call    Who Called:  self  Who Left Message for Patient:  Serena  Does the patient know what this is regarding?:  yes  Best Call Back Number:  [phone] (home)   Additional Information:  Patient states it is regarding changing her surgery date. Patient states she is ok for Tuesday 05/26/20. Please call patient. Thanks!

## 2020-05-21 ENCOUNTER — ANESTHESIA EVENT (OUTPATIENT)
Dept: SURGERY | Facility: HOSPITAL | Age: 60
End: 2020-05-21
Payer: MEDICARE

## 2020-05-21 ENCOUNTER — HOSPITAL ENCOUNTER (OUTPATIENT)
Dept: PREADMISSION TESTING | Facility: HOSPITAL | Age: 60
Discharge: HOME | End: 2020-05-21
Attending: SURGERY
Payer: MEDICARE

## 2020-05-21 ENCOUNTER — HOSPITAL ENCOUNTER (OUTPATIENT)
Dept: CARDIOLOGY | Facility: HOSPITAL | Age: 60
Discharge: HOME | End: 2020-05-21
Attending: SURGERY
Payer: MEDICARE

## 2020-05-21 DIAGNOSIS — K81.1 CHRONIC CHOLECYSTITIS: ICD-10-CM

## 2020-05-21 PROCEDURE — 93005 ELECTROCARDIOGRAM TRACING: CPT

## 2020-05-21 PROCEDURE — 99900103 DSU ONLY-NO CHARGE-INITIAL HR (STAT)

## 2020-05-21 NOTE — ANESTHESIA PREPROCEDURE EVALUATION
05/21/2020  Janina Klein is a 60 y.o., female.    Anesthesia Evaluation    I have reviewed the Patient Summary Reports.    I have reviewed the Nursing Notes.   I have reviewed the Medications.     Review of Systems  Social:  Smoker    Hematology/Oncology:     Oncology Normal     EENT/Dental:EENT/Dental Normal   Cardiovascular:   Hypertension    Pulmonary:   COPD    Hepatic/GI:   PUD, GERD    Neurological:   Chronic Pain Syndrome   Endocrine:  Endocrine Normal    Dermatological:  Skin Normal        Physical Exam  General:  Well nourished    Airway/Jaw/Neck:  Airway Findings: Mouth Opening: Normal Tongue: Normal  General Airway Assessment: Adult  Mallampati: II  TM Distance: Normal, at least 6 cm      Dental:  Dental Findings: Upper Dentures   Chest/Lungs:  Chest/Lungs Findings: Clear to auscultation     Heart/Vascular:  Heart Findings: Rate: Normal  Rhythm: Regular Rhythm        Mental Status:  Mental Status Findings:  Cooperative, Alert and Oriented         Anesthesia Plan  Type of Anesthesia, risks & benefits discussed:  Anesthesia Type:  general  Patient's Preference:   Intra-op Monitoring Plan: standard ASA monitors  Intra-op Monitoring Plan Comments:   Post Op Pain Control Plan: IV/PO Opioids PRN  Post Op Pain Control Plan Comments:   Induction:   IV  Beta Blocker:         Informed Consent: Patient understands risks and agrees with Anesthesia plan.  Questions answered. Anesthesia consent signed with patient.  ASA Score: 3     Day of Surgery Review of History & Physical:            Ready For Surgery From Anesthesia Perspective.

## 2020-05-24 ENCOUNTER — LAB VISIT (OUTPATIENT)
Dept: FAMILY MEDICINE | Facility: CLINIC | Age: 60
End: 2020-05-24
Payer: MEDICARE

## 2020-05-24 DIAGNOSIS — K81.1 CHRONIC CHOLECYSTITIS: ICD-10-CM

## 2020-05-24 LAB — SARS-COV-2 RNA RESP QL NAA+PROBE: NOT DETECTED

## 2020-05-24 PROCEDURE — U0003 INFECTIOUS AGENT DETECTION BY NUCLEIC ACID (DNA OR RNA); SEVERE ACUTE RESPIRATORY SYNDROME CORONAVIRUS 2 (SARS-COV-2) (CORONAVIRUS DISEASE [COVID-19]), AMPLIFIED PROBE TECHNIQUE, MAKING USE OF HIGH THROUGHPUT TECHNOLOGIES AS DESCRIBED BY CMS-2020-01-R: HCPCS

## 2020-05-26 ENCOUNTER — HOSPITAL ENCOUNTER (OUTPATIENT)
Facility: HOSPITAL | Age: 60
Discharge: HOME | End: 2020-05-26
Attending: SURGERY | Admitting: SURGERY
Payer: MEDICARE

## 2020-05-26 ENCOUNTER — ANESTHESIA (OUTPATIENT)
Dept: SURGERY | Facility: HOSPITAL | Age: 60
End: 2020-05-26
Payer: MEDICARE

## 2020-05-26 DIAGNOSIS — K81.1 CHRONIC CHOLECYSTITIS: ICD-10-CM

## 2020-05-26 PROCEDURE — D9220A PRA ANESTHESIA: Mod: ANES,,, | Performed by: ANESTHESIOLOGY

## 2020-05-26 PROCEDURE — 71000033 HC RECOVERY, INTIAL HOUR: Performed by: SURGERY

## 2020-05-26 PROCEDURE — D9220A PRA ANESTHESIA: ICD-10-PCS | Mod: CRNA,,, | Performed by: NURSE ANESTHETIST, CERTIFIED REGISTERED

## 2020-05-26 PROCEDURE — 87075 CULTR BACTERIA EXCEPT BLOOD: CPT

## 2020-05-26 PROCEDURE — 47562 PR LAP,CHOLECYSTECTOMY: ICD-10-PCS | Mod: 80,,, | Performed by: SURGERY

## 2020-05-26 PROCEDURE — 36000709 HC OR TIME LEV III EA ADD 15 MIN: Performed by: SURGERY

## 2020-05-26 PROCEDURE — 47562 LAPAROSCOPIC CHOLECYSTECTOMY: CPT | Mod: 80,,, | Performed by: SURGERY

## 2020-05-26 PROCEDURE — 36000708 HC OR TIME LEV III 1ST 15 MIN: Performed by: SURGERY

## 2020-05-26 PROCEDURE — C9290 INJ, BUPIVACAINE LIPOSOME: HCPCS | Performed by: SURGERY

## 2020-05-26 PROCEDURE — 88304 TISSUE EXAM BY PATHOLOGIST: CPT | Performed by: PATHOLOGY

## 2020-05-26 PROCEDURE — 00790 ANES IPER UPR ABD NOS: CPT | Performed by: SURGERY

## 2020-05-26 PROCEDURE — 88304 PR  SURG PATH,LEVEL III: ICD-10-PCS | Mod: 26,,, | Performed by: PATHOLOGY

## 2020-05-26 PROCEDURE — 37000009 HC ANESTHESIA EA ADD 15 MINS: Performed by: SURGERY

## 2020-05-26 PROCEDURE — 63600175 PHARM REV CODE 636 W HCPCS: Performed by: NURSE ANESTHETIST, CERTIFIED REGISTERED

## 2020-05-26 PROCEDURE — D9220A PRA ANESTHESIA: ICD-10-PCS | Mod: ANES,,, | Performed by: ANESTHESIOLOGY

## 2020-05-26 PROCEDURE — 71000015 HC POSTOP RECOV 1ST HR: Performed by: SURGERY

## 2020-05-26 PROCEDURE — 63600175 PHARM REV CODE 636 W HCPCS: Performed by: SURGERY

## 2020-05-26 PROCEDURE — 25000003 PHARM REV CODE 250: Performed by: ANESTHESIOLOGY

## 2020-05-26 PROCEDURE — 47562 LAPAROSCOPIC CHOLECYSTECTOMY: CPT | Mod: ,,, | Performed by: SURGERY

## 2020-05-26 PROCEDURE — 25000003 PHARM REV CODE 250: Performed by: NURSE ANESTHETIST, CERTIFIED REGISTERED

## 2020-05-26 PROCEDURE — 27201423 OPTIME MED/SURG SUP & DEVICES STERILE SUPPLY: Performed by: SURGERY

## 2020-05-26 PROCEDURE — 87070 CULTURE OTHR SPECIMN AEROBIC: CPT

## 2020-05-26 PROCEDURE — D9220A PRA ANESTHESIA: Mod: CRNA,,, | Performed by: NURSE ANESTHETIST, CERTIFIED REGISTERED

## 2020-05-26 PROCEDURE — 47562 PR LAP,CHOLECYSTECTOMY: ICD-10-PCS | Mod: ,,, | Performed by: SURGERY

## 2020-05-26 PROCEDURE — 88304 TISSUE EXAM BY PATHOLOGIST: CPT | Mod: 26,,, | Performed by: PATHOLOGY

## 2020-05-26 PROCEDURE — 37000008 HC ANESTHESIA 1ST 15 MINUTES: Performed by: SURGERY

## 2020-05-26 RX ORDER — ONDANSETRON 4 MG/1
4 TABLET, FILM COATED ORAL EVERY 6 HOURS PRN
Qty: 30 TABLET | Refills: 0 | Status: SHIPPED | OUTPATIENT
Start: 2020-05-26 | End: 2020-07-20 | Stop reason: SDUPTHER

## 2020-05-26 RX ORDER — PROPOFOL 10 MG/ML
VIAL (ML) INTRAVENOUS
Status: DISCONTINUED | OUTPATIENT
Start: 2020-05-26 | End: 2020-05-26

## 2020-05-26 RX ORDER — SUCCINYLCHOLINE CHLORIDE 20 MG/ML
INJECTION INTRAMUSCULAR; INTRAVENOUS
Status: DISCONTINUED | OUTPATIENT
Start: 2020-05-26 | End: 2020-05-26

## 2020-05-26 RX ORDER — ONDANSETRON 2 MG/ML
INJECTION INTRAMUSCULAR; INTRAVENOUS
Status: DISCONTINUED | OUTPATIENT
Start: 2020-05-26 | End: 2020-05-26

## 2020-05-26 RX ORDER — OXYCODONE AND ACETAMINOPHEN 5; 325 MG/1; MG/1
1 TABLET ORAL ONCE
Status: COMPLETED | OUTPATIENT
Start: 2020-05-26 | End: 2020-05-26

## 2020-05-26 RX ORDER — GLYCOPYRROLATE 0.2 MG/ML
INJECTION INTRAMUSCULAR; INTRAVENOUS
Status: DISCONTINUED | OUTPATIENT
Start: 2020-05-26 | End: 2020-05-26

## 2020-05-26 RX ORDER — LIDOCAINE HYDROCHLORIDE 10 MG/ML
1 INJECTION, SOLUTION EPIDURAL; INFILTRATION; INTRACAUDAL; PERINEURAL ONCE
Status: DISCONTINUED | OUTPATIENT
Start: 2020-05-26 | End: 2020-05-26 | Stop reason: HOSPADM

## 2020-05-26 RX ORDER — MEPERIDINE HYDROCHLORIDE 50 MG/ML
INJECTION INTRAMUSCULAR; INTRAVENOUS; SUBCUTANEOUS
Status: DISCONTINUED | OUTPATIENT
Start: 2020-05-26 | End: 2020-05-26

## 2020-05-26 RX ORDER — ROCURONIUM BROMIDE 10 MG/ML
INJECTION, SOLUTION INTRAVENOUS
Status: DISCONTINUED | OUTPATIENT
Start: 2020-05-26 | End: 2020-05-26

## 2020-05-26 RX ORDER — OXYCODONE AND ACETAMINOPHEN 10; 325 MG/1; MG/1
1 TABLET ORAL EVERY 4 HOURS PRN
Qty: 30 TABLET | Refills: 0 | Status: SHIPPED | OUTPATIENT
Start: 2020-05-26 | End: 2020-06-05 | Stop reason: SDUPTHER

## 2020-05-26 RX ORDER — MIDAZOLAM HYDROCHLORIDE 1 MG/ML
INJECTION, SOLUTION INTRAMUSCULAR; INTRAVENOUS
Status: DISCONTINUED | OUTPATIENT
Start: 2020-05-26 | End: 2020-05-26

## 2020-05-26 RX ORDER — OXYCODONE AND ACETAMINOPHEN 5; 325 MG/1; MG/1
TABLET ORAL
Status: DISCONTINUED
Start: 2020-05-26 | End: 2020-05-26 | Stop reason: HOSPADM

## 2020-05-26 RX ORDER — SODIUM CHLORIDE, SODIUM LACTATE, POTASSIUM CHLORIDE, CALCIUM CHLORIDE 600; 310; 30; 20 MG/100ML; MG/100ML; MG/100ML; MG/100ML
INJECTION, SOLUTION INTRAVENOUS CONTINUOUS
Status: DISCONTINUED | OUTPATIENT
Start: 2020-05-26 | End: 2020-05-26 | Stop reason: HOSPADM

## 2020-05-26 RX ORDER — KETOROLAC TROMETHAMINE 30 MG/ML
INJECTION, SOLUTION INTRAMUSCULAR; INTRAVENOUS
Status: DISCONTINUED | OUTPATIENT
Start: 2020-05-26 | End: 2020-05-26

## 2020-05-26 RX ADMIN — PROPOFOL 50 MG: 10 INJECTION, EMULSION INTRAVENOUS at 03:05

## 2020-05-26 RX ADMIN — SODIUM CHLORIDE, POTASSIUM CHLORIDE, SODIUM LACTATE AND CALCIUM CHLORIDE: 600; 310; 30; 20 INJECTION, SOLUTION INTRAVENOUS at 12:05

## 2020-05-26 RX ADMIN — OXYCODONE HYDROCHLORIDE AND ACETAMINOPHEN 1 TABLET: 5; 325 TABLET ORAL at 05:05

## 2020-05-26 RX ADMIN — MEPERIDINE HYDROCHLORIDE 20 MG: 50 INJECTION INTRAMUSCULAR; INTRAVENOUS; SUBCUTANEOUS at 03:05

## 2020-05-26 RX ADMIN — MIDAZOLAM HYDROCHLORIDE 1 MG: 1 INJECTION, SOLUTION INTRAMUSCULAR; INTRAVENOUS at 02:05

## 2020-05-26 RX ADMIN — MEPERIDINE HYDROCHLORIDE 20 MG: 50 INJECTION INTRAMUSCULAR; INTRAVENOUS; SUBCUTANEOUS at 02:05

## 2020-05-26 RX ADMIN — SUCCINYLCHOLINE CHLORIDE 120 MG: 20 INJECTION, SOLUTION INTRAMUSCULAR; INTRAVENOUS at 03:05

## 2020-05-26 RX ADMIN — ROCURONIUM BROMIDE 20 MG: 10 INJECTION, SOLUTION INTRAVENOUS at 03:05

## 2020-05-26 RX ADMIN — GLYCOPYRROLATE 0.4 MG: 0.2 INJECTION INTRAMUSCULAR; INTRAVENOUS at 03:05

## 2020-05-26 RX ADMIN — ROCURONIUM BROMIDE 10 MG: 10 INJECTION, SOLUTION INTRAVENOUS at 03:05

## 2020-05-26 RX ADMIN — MEPERIDINE HYDROCHLORIDE 10 MG: 50 INJECTION INTRAMUSCULAR; INTRAVENOUS; SUBCUTANEOUS at 04:05

## 2020-05-26 RX ADMIN — SODIUM CHLORIDE, POTASSIUM CHLORIDE, SODIUM LACTATE AND CALCIUM CHLORIDE: 600; 310; 30; 20 INJECTION, SOLUTION INTRAVENOUS at 03:05

## 2020-05-26 RX ADMIN — ERTAPENEM SODIUM 1 G: 1 INJECTION, POWDER, LYOPHILIZED, FOR SOLUTION INTRAMUSCULAR; INTRAVENOUS at 02:05

## 2020-05-26 RX ADMIN — ONDANSETRON 4 MG: 2 INJECTION INTRAMUSCULAR; INTRAVENOUS at 04:05

## 2020-05-26 RX ADMIN — KETOROLAC TROMETHAMINE 30 MG: 30 INJECTION, SOLUTION INTRAMUSCULAR at 04:05

## 2020-05-26 RX ADMIN — PROPOFOL 100 MG: 10 INJECTION, EMULSION INTRAVENOUS at 03:05

## 2020-05-26 NOTE — H&P
[facility] - Periop Services  General Surgery  H&P      Patient Name: Janina Klein  MRN: [id number]     Chief Complaint:  I am having my gallbladder removed        HPI:  Ms. Klein presents today to proceed with a cholecystectomy.  She was seen in Surgery Clinic on 3/16/2020 with complaints of postprandial right upper quadrant abdominal discomfort that radiates towards the right chest and upper midline back.  Pain is associated nausea but no vomiting.  No fever or chills.  No jaundice, biliuria, acholia, diarrhea, constipation, melena, hematochezia, hematemesis, etc.  No other aggravating factors.  No alleviating factors.  No other associated symptoms.  In the interval since her last office visit 2/6/2020 she had a HIDA scan with CCK.  She was highly symptomatic with symptoms reproduced identically upon injection with the CCK.  Previous evaluation has included an EGD, colonoscopy, ultrasound, and a HIDA scan in 2015.  The HIDA scan she had in 2015 revealed an elevated ejection fraction.  She was entirely asymptomatic during the CCK portion of that HIDA scan.        Allergies & Meds:           Allergen Reactions    Lisinopril                  Current Outpatient Medications   Medication Sig Dispense Refill    amLODIPine (NORVASC) 5 MG tablet TAKE 1 TABLET EVERY DAY 90 tablet 3    atorvastatin (LIPITOR) 10 MG tablet Take 1 tablet (10 mg total) by mouth once daily. 90 tablet 0    gabapentin (NEURONTIN) 800 MG tablet TAKE 1 TABLET THREE TIMES DAILY 270 tablet 1    metoprolol tartrate (LOPRESSOR) 50 MG tablet TAKE 1 TABLET TWICE DAILY 180 tablet 3    montelukast (SINGULAIR) 10 mg tablet TAKE 1 TABLET BY MOUTH EVERY DAY 90 tablet 1    pantoprazole (PROTONIX) 40 MG tablet TAKE 1 TABLET EVERY DAY 90 tablet 1    promethazine (PHENERGAN) 25 MG tablet Take 1 tablet (25 mg total) by mouth every 6 (six) hours as needed for Nausea. 30 tablet 0    sucralfate (CARAFATE) 1 gram tablet Take 1 tablet (1 g  total) by mouth 4 (four) times daily. 120 tablet 11    tiotropium-olodaterol (STIOLTO RESPIMAT) 2.5-2.5 mcg/actuation Mist Inhale 2 puffs into the lungs once daily. Controller 12 g 3    albuterol 90 mcg/actuation inhaler Inhale 2 puffs into the lungs every 6 (six) hours as needed for Wheezing. 18 g 11    albuterol-ipratropium 2.5mg-0.5mg/3mL (DUO-NEB) 0.5 mg-3 mg(2.5 mg base)/3 mL nebulizer solution USE 1 VIAL VIA NEBULIZER FOUR TIMES DAILY (Patient not taking: Reported on 3/16/2020) 360 mL 9    fluticasone (FLONASE) 50 mcg/actuation nasal spray 2 sprays (100 mcg total) by Each Nare route once daily. (Patient not taking: Reported on 3/16/2020) 3 Bottle 3            PMFSHx:       Past Medical History:   Diagnosis Date    Anxiety      Arthritis       osteoarthritis    Chronic pain      Chronic pain      COPD (chronic obstructive pulmonary disease)      GERD (gastroesophageal reflux disease)      History of stomach ulcers      Hypertension      IBS (irritable bowel syndrome)       [facility] Diagnosed 2004    Palpitations                 Past Surgical History:   Procedure Laterality Date    APPENDECTOMY         SECTION        COLONOSCOPY        ? results in [place]    COLONOSCOPY   2015     Dr. Farfan   diverticulosis    COLONOSCOPY N/A 2020     Procedure: COLONOSCOPY;  Surgeon: Casey Farfan MD;  Location: [facility];  Service: General;  Laterality: N/A;    CYSTOSCOPY        ESOPHAGOGASTRODUODENOSCOPY N/A 2020     Procedure: ESOPHAGOGASTRODUODENOSCOPY (EGD);  Surgeon: Casey Farfan MD;  Location: [facility];  Service: General;  Laterality: N/A;  WITH BXS    HERNIA REPAIR        HYSTERECTOMY   9/3/2014     with bso    TOTAL ABDOMINAL HYSTERECTOMY W/ BILATERAL SALPINGOOPHORECTOMY Bilateral                 Family History   Problem Relation Age of Onset    Hypertension Mother      Diabetes Sister      Colon cancer Maternal Grandmother      Breast cancer  Other           Social History            Tobacco Use    Smoking status: Current Every Day Smoker       Packs/day: 0.50       Types: Cigarettes    Smokeless tobacco: Never Used   Substance Use Topics    Alcohol use: No    Drug use: No         Review of Systems   Constitutional: Negative for appetite change, chills, fatigue, fever and unexpected weight change.   HENT: Negative for congestion, dental problem, ear pain, mouth sores, postnasal drip, rhinorrhea, sore throat, tinnitus, trouble swallowing and voice change.    Eyes: Negative for photophobia, pain, discharge and visual disturbance.   Respiratory: Negative for cough, chest tightness, shortness of breath and wheezing.    Cardiovascular: Negative for chest pain, palpitations and leg swelling.   Gastrointestinal: Negative for blood in stool, constipation, diarrhea and vomiting.   Endocrine: Negative for cold intolerance, heat intolerance, polydipsia, polyphagia and polyuria.   Genitourinary: Negative for difficulty urinating, dysuria, flank pain, frequency, hematuria and urgency.   Musculoskeletal: Negative for arthralgias, joint swelling and myalgias.   Skin: Negative for color change and rash.   Allergic/Immunologic: Negative for immunocompromised state.   Neurological: Negative for dizziness, tremors, seizures, syncope, speech difficulty, weakness, numbness and headaches.   Hematological: Negative for adenopathy. Does not bruise/bleed easily.   Psychiatric/Behavioral: Negative for agitation, confusion, hallucinations, self-injury and suicidal ideas. The patient is not nervous/anxious.              Physical Exam   Constitutional: She is oriented to person, place, and time. She appears well-developed and well-nourished. She is active.  Non-toxic appearance. No distress.   Body mass index is 24.2 kg/m².   HENT: Head: Normocephalic and atraumatic.   Right Ear: Hearing and external ear normal. No drainage or tenderness.   Left Ear: Hearing and external ear  normal. No drainage or tenderness.   Nose: Nose normal. No rhinorrhea. No epistaxis.   Mouth/Throat: Uvula is midline, oropharynx is clear and moist and mucous membranes are normal. Mucous membranes are not pale, not dry and not cyanotic. No oropharyngeal exudate.   Eyes: Pupils are equal, round, and reactive to light. Conjunctivae and lids are normal. Right eye exhibits no discharge and no exudate. Left eye exhibits no discharge and no exudate. Right conjunctiva is not injected. Right eye exhibits no nystagmus. Left eye exhibits no nystagmus.   Neck: Trachea normal, full passive range of motion without pain and phonation normal. No JVD present. Carotid bruit is not present. No tracheal deviation present. No thyroid mass and no thyromegaly present.   Cardiovascular: Normal rate, regular rhythm, S1 normal, S2 normal, normal heart sounds and intact distal pulses. PMI is not displaced. Exam reveals no gallop and no friction rub.   No murmur heard.  Pulmonary/Chest: Effort normal and breath sounds normal. No accessory muscle usage. No respiratory distress. She exhibits no mass, no tenderness and no crepitus. Right breast exhibits no inverted nipple, no mass, no nipple discharge, no skin change and no tenderness. Left breast exhibits no inverted nipple, no mass, no nipple discharge, no skin change and no tenderness. Breasts are symmetrical.   Abdominal: Soft. Normal appearance and bowel sounds are normal. She exhibits no distension, no fluid wave, no abdominal bruit and no mass. There is no hepatosplenomegaly. There is no tenderness. There is no rebound, no guarding and negative Ch's sign. No hernia. Hernia confirmed negative in the right inguinal area and confirmed negative in the left inguinal area.   Genitourinary: Rectum normal and vagina normal. There is no rash or lesion on the right labia. There is no rash or lesion on the left labia. Right adnexum displays no mass. Left adnexum displays no mass. No vaginal  discharge found.   Musculoskeletal:        Cervical back: Normal.        Thoracic back: Normal.        Lumbar back: Normal.        Right upper arm: Normal.        Left upper arm: Normal.        Right forearm: Normal.        Left forearm: Normal.        Right hand: Normal.        Left hand: Normal.        Right upper leg: Normal.        Left upper leg: Normal.        Right lower leg: Normal.        Left lower leg: Normal.        Right foot: Normal.        Left foot: Normal.   Lymphadenopathy:        Head (right side): No submental, no submandibular and no posterior auricular adenopathy present.        Head (left side): No submental, no submandibular and no posterior auricular adenopathy present.     She has no cervical adenopathy.     She has no axillary adenopathy.        Right: No inguinal and no supraclavicular adenopathy present.        Left: No inguinal and no supraclavicular adenopathy present.   Neurological: She is alert and oriented to person, place, and time. She has normal strength. No cranial nerve deficit or sensory deficit. Coordination and gait normal. GCS eye subscore is 4. GCS verbal subscore is 5. GCS motor subscore is 6.   Skin: Skin is warm, dry and intact. No rash noted. No cyanosis. Nails show no clubbing.   Psychiatric: She has a normal mood and affect. Her speech is normal. Judgment and thought content normal. Her mood appears not anxious. Her affect is not inappropriate. She is not actively hallucinating. She does not exhibit a depressed mood.             Medical Records Review:  HIDA scan films and report reviewed from 2/27/2020.  No evidence of ductal obstruction.  Ejection fraction 59%.  As noted above she was highly symptomatic during the CCK portion of the exam was symptoms identical to her presenting complaints.     EGD, colonoscopy, and pathology reports reviewed from 1/24/2020.  EGD revealed a small hiatal hernia, evidence of esophagitis, evidence of gastritis, and evidence of  duodenitis.  Findings were present despite ongoing therapy initiated well before the endoscopy with Protonix.  Gastric biopsies confirmed chronic Helicobacter negative gastritis.  Duodenal biopsies unremarkable.  Esophageal biopsies revealed severe acute and chronic esophagitis with intestinal metaplasia, no dysplasia.  Colonoscopy revealed sigmoid diverticulosis only.    Gallbladder ultrasound report reviewed from 11/23/2019.  Gallbladder was distended but without evidence of cholelithiasis, cholecystitis, or choledocholithiasis; no dilated bile ducts.  There was a positive sonographic Ch sign present.  HIDA scan films and report reviewed from 4/1/2015.  Ejection fraction was calculated at 92%.    Lab results reviewed from 5/21/2020.  CBC showed no evidence of leukocytosis, anemia, or thrombocytopenia.  Electrolytes revealed a very mild hypocarbia.  BUN and creatinine showed no evidence of renal dysfunction.  Glucose showed no suspicion diabetes.  Liver profile showed no evidence of hepatocellular disease or biliary obstruction.  Amylase and lipase showed no evidence of pancreatitis.    COVID-19 routine screening negative on 5/24/2020.    EKG reviewed from 5/21/2020.  Twelve lead tracing showed normal sinus rhythm with no evidence of any ischemic changes.     Assessment:       Abnormal HIDA scan.  Probable chronic cholecystitis.  Right upper quadrant abdominal pain. Differential diagnosis includes but is not limited to reflux disease, esophagitis, ulcer disease, gastritis, ampullary dysfunction, biliary tract disease, gastrointestinal neoplasm, etc.  Options at this time include but not limited to endoscopy, second opinion, radiologic studies, laparoscopy, empiric therapy, symptomatic therapy, etc.  Multiple comorbid issues ( COPD, hypertension, hyperlipidemia, hiatal hernia, reflux disease, Yousif's esophagus ) increase the complexity of required perioperative evaluation & management, risks of complications,  and likely will increase the difficulty and complexity of postoperative care, etc.  These issues must be factored in to preoperative evaluation and perioperative management.     1. Chronic cholecystitis    2. Chronic obstructive pulmonary disease, unspecified COPD type    3. Essential hypertension    4. Gastroesophageal reflux disease with esophagitis    5. Yousif's esophagus without dysplasia    6. Hiatal hernia    7. Diverticulosis of colon          Plan:      Chronic cholecystitis  -     Case Request Operating Room: CHOLECYSTECTOMY-LAPAROSCOPIC  -     Invanz 1 g intravenous preop     Chronic obstructive pulmonary disease, unspecified COPD type     Essential hypertension     Gastroesophageal reflux disease with esophagitis     Yousif's esophagus without dysplasia     Hiatal hernia     Diverticulosis of colon           Follow up around 6/6/2020 for routine postop evaluation.     Counseling/Medical Decision Making:  Janina Klein was counseled regarding the results of the evaluation thus far and the differential diagnosis including but not limited to: cholelithiasis, cholecystitis, choledocholithiasis, gastritis, duodenitis, ulcer disease, reflux disease, Sphincter of Oddi Dysfunction, pancreatitis, irritable bowel syndrome, inflammatory bowel disease, diverticular disease, neoplastic disease, infectious disease, ischemic disease, etc.  We also discussed all diagnostic and therapeutic options as well as the risks, benefits, possible complications, details of, and indications for each option.  Options discussed included but were not limited to: endoscopy, laparoscopic cholecystectomy, conventional cholecystectomy, radiologic studies, second opinion, observation, empiric therapy, symptomatic therapy, stone dissolving therapy, lithotripsy, referral elsewhere for treatment, etc.  Possible complications of laparoscopic surgery discussed included but were not limited to: bleeding, infections, scars, chronic pain, nerve  damage, internal injuries, bile duct injuries, inability to complete the operation laparoscopically / need to convert to an open technique, retained stones, persistent symptoms, need for additional operations or procedures, chronic diarrhea, weight gain, weight loss, etc.  Questions were solicited and answered.  Jt publications were provided regarding endoscopy, gallbladder disease, laparoscopic surgery, etc.  I read the entire consent form to her verbatim. A copy of the consent form was provided for her review outside the office and hospital prior to the procedure.  Entire conversation was held in laymans terms.  All unfamiliar terms defined.  At the conclusion of the conversation she voiced complete understanding of all we discussed, satisfaction that all questions were answered, and that she felt fully informed and completely capable of making an informed decision.  She requests and desires to proceed with an attempted laparoscopic cholecystectomy.  She fully understands the distinct possibility of persistent symptoms following surgery.    No evident contraindication to proceeding with planned operation today.

## 2020-05-26 NOTE — OP NOTE
[facility] - Periop Services  General Surgery  Op Note  05/26/2020      Patient Name: Janina Klein  MRN: [id number]         SURGEON:  Casey Farfan M.D.     ASSISTANT:   Jem Butt M.D.     PREOPERATIVE DIAGNOSIS:  Chronic cholecystitis     POSTOPERATIVE DIAGNOSIS:  Same     SURGICAL PROCEDURE PERFORMED:  Laparoscopic Cholecystectomy    ANESTHESIA:  General Endotracheal.     INDICATIONS FOR PROCEDURE:  Postprandial right upper quadrant abdominal pain.  Abnormal HIDA scan with elevated ejection fraction.    SURGICAL FINDINGS:  Severe chronic cholecystitis.  Extensive fibrosis in the gallbladder bed and triangle of Calot.  Cystic duct, common bile duct, common hepatic duct, cystic artery, and right hepatic artery are all anatomically unremarkable.  No dilated bile ducts.  Critical view of safety was achieved.  Extensive adhesions of omentum, duodenum, and distal stomach to the gallbladder and liver.  Minimal abdominal adhesive disease from prior surgery in the lower abdomen below the umbilicus consisting of omentum to the anterior abdominal wall only.  Gallbladder was markedly enlarged, floppy, and the wall was quite thick.     PROCEDURE IN DETAIL:   Janina Klein was identified by name, wrist band, and birth date in preop holding.  She confirmed identity.  Informed consent process was reviewed.  She  verbalized consent as well as understanding of all treatment options, risks, benefits, details of, and indications for each option.  Intravenous antibiotics were administered.  She was transported the operating room. Time-out completed. Transfered to the OR bed. General anesthesia induced. Abdomen was prepped and draped in the usual sterile fashion with ChloraPrep, sterile towels, and sterile drapes.  ChloraPrep was permitted to dry for 3 min prior to placing towels and drapes.  Exparel was infiltrated into each port site as each port was placed for postoperative analgesia.  A total of 20 cc  was used.  1 cm vertical periumbilical skin incision was made with a number 15 blade.  Subcutaneous tissue was divided with electrocautery. Traction sutures were placed on either side of the fascial midline.  Fascia was incised in the midline with a number 15 blade. Peritoneum was entered bluntly.  Inner surface of the anterior abdominal wall was examined digitally.  No adhesions or other pathology identified. 12 mm blunt-tipped Salas balloon trocar was introduced without difficulty or complications. Telescope was inserted.  Abdominal cavity was inspected.  No evidence of any intra-abdominal or retroperitoneal injuries.  The above pathology was identified. Three 5 mm ports were then inserted through separate skin incisions under laparoscopic visualization without difficulty or complications.  One of these was inserted in the epigastrium and 2 in the right upper quadrant.  Anterior and cephalad traction was applied to the dome of the gallbladder.  Adhesions of omentum, duodenum, and stomach to the gallbladder and liver were divided with electrocautery and blunt dissection.  Inferior and lateral traction was applied to the the infundibulum.  Blunt dissection was carried out in the triangle of Calot to identify the cystic duct, common bile duct, common hepatic duct, cystic artery, and right hepatic artery.  Critical view of safety achieved.  Cystic duct was clipped twice proximally and once distally.  Cystic artery was clipped twice proximally and once distally.  The structures were divided between the proximal distal clips.  The gallbladder was then freed from its attachments to the liver with blunt and electrocautery assisted dissection. Just prior to completing the amputation tension was relaxed on the liver and hemostasis of the gallbladder bed was ensured.  The gallbladder was then completely freed from the liver. Telescope was moved to the epigastric port.  Gallbladder was retrieved through the umbilical port.   Umbilical port was replaced.  Pneumoperitoneum was reestablished.  Telescope was moved back to the umbilical port.  Abdomen was irrigated copiously.  All irrigation solution was evacuated.  Hemostasis was ensured throughout the abdomen.  The three 5 mm ports were removed. Hemostasis was ensured at these sites by careful inspection of the internal and external surfaces.  Pneumoperitoneum was completely evacuated.  Umbilical port was removed and hemostasis was ensured at this site by careful inspection.  Peritoneum at the umbilical site was closed with 3 0 Vicryl. Fascia was closed with 0 Vicryl.  Subcutaneous tissue was closed with 3 0 Vicryl. Skin was closed at all port sites with 4 0 undyed subcuticular Vicryl suture. Sterile Dermabond dressings applied. She was emerged from anesthesia without difficulty and transported to the recovery room in good condition.     TOLERATED:  Well    COUNTS:  Correct    DRAINS:  None     ESTIMATED BLOOD LOSS:  Less than 5 cc     SPECIMEN:  Gallbladder.  Bile Culture     COMPLICATIONS:  None     DISPOSITION:  To PACU, stable.      Documented with M*Modal voice recognition software.        Casey Farfan MD FACS

## 2020-05-26 NOTE — DISCHARGE INSTRUCTIONS
Discharge Instructions: After Your Surgery  Youve just had surgery. During surgery, you were given medicine called anesthesia to keep you relaxed and free of pain. After surgery, you may have some pain or nausea. This is common. Here are some tips for feeling better and getting well after surgery.     Stay on schedule with your medicine.   Going home  Your healthcare provider will show you how to take care of yourself when you go home. He or she will also answer your questions. Have an adult family member or friend drive you home. For the first 24 hours after your surgery:  · Do not drive or use heavy equipment.  · Do not make important decisions or sign legal papers.  · Do not drink alcohol.  · Have someone stay with you, if needed. He or she can watch for problems and help keep you safe.  Be sure to go to all follow-up visits with your healthcare provider. And rest after your surgery for as long as your healthcare provider tells you to.  Coping with pain  If you have pain after surgery, pain medicine will help you feel better. Take it as told, before pain becomes severe. Also, ask your healthcare provider or pharmacist about other ways to control pain. This might be with heat, ice, or relaxation. And follow any other instructions your surgeon or nurse gives you.  Tips for taking pain medicine  To get the best relief possible, remember these points:  · Pain medicines can upset your stomach. Taking them with a little food may help.  · Most pain relievers taken by mouth need at least 20 to 30 minutes to start to work.  · Taking medicine on a schedule can help you remember to take it. Try to time your medicine so that you can take it before starting an activity. This might be before you get dressed, go for a walk, or sit down for dinner.  · Constipation is a common side effect of pain medicines. Call your healthcare provider before taking any medicines such as laxatives or stool softeners to help ease constipation.  Also ask if you should skip any foods. Drinking lots of fluids and eating foods such as fruits and vegetables that are high in fiber can also help. Remember, do not take laxatives unless your surgeon has prescribed them.  · Drinking alcohol and taking pain medicine can cause dizziness and slow your breathing. It can even be deadly. Do not drink alcohol while taking pain medicine.  · Pain medicine can make you react more slowly to things. Do not drive or run machinery while taking pain medicine.  Your healthcare provider may tell you to take acetaminophen to help ease your pain. Ask him or her how much you are supposed to take each day. Acetaminophen or other pain relievers may interact with your prescription medicines or other over-the-counter (OTC) medicines. Some prescription medicines have acetaminophen and other ingredients. Using both prescription and OTC acetaminophen for pain can cause you to overdose. Read the labels on your OTC medicines with care. This will help you to clearly know the list of ingredients, how much to take, and any warnings. It may also help you not take too much acetaminophen. If you have questions or do not understand the information, ask your pharmacist or healthcare provider to explain it to you before you take the OTC medicine.  Managing nausea  Some people have an upset stomach after surgery. This is often because of anesthesia, pain, or pain medicine, or the stress of surgery. These tips will help you handle nausea and eat healthy foods as you get better. If you were on a special food plan before surgery, ask your healthcare provider if you should follow it while you get better. These tips may help:  · Do not push yourself to eat. Your body will tell you when to eat and how much.  · Start off with clear liquids and soup. They are easier to digest.  · Next try semi-solid foods, such as mashed potatoes, applesauce, and gelatin, as you feel ready.  · Slowly move to solid foods. Dont  eat fatty, rich, or spicy foods at first.  · Do not force yourself to have 3 large meals a day. Instead eat smaller amounts more often.  · Take pain medicines with a small amount of solid food, such as crackers or toast, to avoid nausea.     Call your surgeon if  · You still have pain an hour after taking medicine. The medicine may not be strong enough.  · You feel too sleepy, dizzy, or groggy. The medicine may be too strong.  · You have side effects like nausea, vomiting, or skin changes, such as rash, itching, or hives.       If you have obstructive sleep apnea  You were given anesthesia medicine during surgery to keep you comfortable and free of pain. After surgery, you may have more apnea spells because of this medicine and other medicines you were given. The spells may last longer than usual.   At home:  · Keep using the continuous positive airway pressure (CPAP) device when you sleep. Unless your healthcare provider tells you not to, use it when you sleep, day or night. CPAP is a common device used to treat obstructive sleep apnea.  · Talk with your provider before taking any pain medicine, muscle relaxants, or sedatives. Your provider will tell you about the possible dangers of taking these medicines.  Date Last Reviewed: 12/1/2016 © 2000-2017 [employer]. [address] [place] [address], [place] [address]. All rights reserved. This information is not intended as a substitute for professional medical care. Always follow your healthcare professional's instructions.        After Gallbladder Surgery  You can usually go home the same day as your surgery. In some cases, you may need to stay overnight. After open laparoscopic surgery, you will usually need to stay in the hospital for 2 to 5 days. Once youre at home, be sure to follow all your healthcare providers instructions.  In the hospital  Bandages will cover your incisions and you may have special boots on your legs to prevent blood clots. To aid  recovery, youll be asked to get up and move as soon as possible. You may also be asked to use a device that helps promote deep breathing to keep your lungs clear.  At home  You can get back to your normal routine as soon as you feel able. To speed healing:  · Take any prescribed pain medicines as directed.  · Follow your healthcare providers instructions about bathing and caring for your incisions.  · Walk and move around as often as possible, but follow your healthcare provider's instructions on how much weight you can lift.   · Ask your healthcare provider about driving and going back to work. This is often about 5 to 10 days after surgery.  Eating normally again  Removing the gallbladder doesnt mean you have to be on a special diet. But you may want to start with light meals. It can also take a few weeks for your digestion to adjust. You may have indigestion, loose stools, or diarrhea. This is common and should go away in time.  Following up  Keep follow-up appointments during your recovery. These allow your healthcare provider to check your progress and answer any questions. Be sure to mention if you have any new symptoms. Also mention if you have diarrhea that doesnt go away.     Call your healthcare provider  Contact your healthcare provider if you have any of the following:  · Fever of 100.4º F (38.0ºC) or higher, or as directed by your healthcare provider  · Chills  · Increasing pain, redness, or drainage at an incision site  · Vomiting or nausea that lasts more than 12 hours  · Prolonged diarrhea  · Belly pain  · Leg swelling or trouble breathing  · Difficulty urinating  · Bleeding from the rectum   Date Last Reviewed: 7/1/2016 © 2000-2017 [employer]. [address] [place] [address], [place] [address]. All rights reserved. This information is not intended as a substitute for professional medical care. Always follow your healthcare professional's instructions.

## 2020-05-26 NOTE — TRANSFER OF CARE
"Anesthesia Transfer of Care Note    Patient: Janina Klein    Procedure(s) Performed: Procedure(s) (LRB):  CHOLECYSTECTOMY-LAPAROSCOPIC (N/A)    Patient location: PACU    Anesthesia Type: general    Transport from OR: Transported from OR on room air with adequate spontaneous ventilation    Post pain: adequate analgesia    Post assessment: no apparent anesthetic complications and tolerated procedure well    Post vital signs: stable    Level of consciousness: awake, alert and oriented    Nausea/Vomiting: no nausea/vomiting    Complications: none    Transfer of care protocol was followed      Last vitals:   Visit Vitals  BP (!) 136/91 (BP Location: Right arm, Patient Position: Lying)   Pulse (!) 58   Resp 11   Ht 5' 4" (1.626 m)   Wt 61.2 kg (135 lb)   SpO2 95%   Breastfeeding? No   BMI 23.17 kg/m²     "

## 2020-05-26 NOTE — DISCHARGE SUMMARY
[facility]  Discharge Note  Short Stay    Procedure(s) (LRB):  CHOLECYSTECTOMY-LAPAROSCOPIC (N/A)    OUTCOME: Patient tolerated treatment/procedure well without complication and is now ready for discharge.    DISPOSITION: Home or Self Care    FINAL DIAGNOSIS:  Chronic cholecystitis    FOLLOWUP: In clinic    DISCHARGE INSTRUCTIONS:    Discharge Procedure Orders   Diet Adult Regular     Order Specific Question Answer Comments   Additional restrictions: Low Chol/Sat Fat      No driving until:     Order Specific Question Answer Comments   Date: 6/27/2020      Other restrictions (specify):   Order Comments: No bending, lifting, pushing, pulling, climbing, driving, etc until released by surgery clinic.  Nothing even the least bit exertional or strenuous until released by surgery clinic.  Showers permissible.  No bathing or swimming.   Scheduling Instructions: No bending, lifting, pushing, pulling, climbing, driving, etc until released by surgery clinic.  Nothing even the least bit exertional or strenuous until released by surgery clinic.  Showers permissible.  No bathing or swimming.     No dressing needed

## 2020-05-26 NOTE — ANESTHESIA POSTPROCEDURE EVALUATION
Anesthesia Post Evaluation    Patient: Janina Klein    Procedure(s) Performed: Procedure(s) (LRB):  CHOLECYSTECTOMY-LAPAROSCOPIC (N/A)    Final Anesthesia Type: general    Patient location during evaluation: PACU  Patient participation: Yes- Able to Participate  Level of consciousness: awake and awake and alert  Post-procedure vital signs: reviewed and stable  Pain management: adequate  Airway patency: patent    PONV status at discharge: No PONV  Anesthetic complications: no      Cardiovascular status: blood pressure returned to baseline  Respiratory status: unassisted and spontaneous ventilation  Hydration status: euvolemic  Follow-up not needed.          Vitals Value Taken Time   /88 5/26/2020  5:27 PM   Temp 98 5/26/2020  5:31 PM   Pulse 68 5/26/2020  5:30 PM   Resp 8 5/26/2020  5:25 PM   SpO2 96 % 5/26/2020  5:30 PM   Vitals shown include unvalidated device data.      No case tracking events are documented in the log.      Pain/Kit Score: Kit Score: 9 (5/26/2020  5:13 PM)

## 2020-05-28 ENCOUNTER — PATIENT MESSAGE (OUTPATIENT)
Dept: SURGERY | Facility: CLINIC | Age: 60
End: 2020-05-28

## 2020-05-29 VITALS
RESPIRATION RATE: 8 BRPM | WEIGHT: 135 LBS | SYSTOLIC BLOOD PRESSURE: 132 MMHG | HEIGHT: 64 IN | BODY MASS INDEX: 23.05 KG/M2 | HEART RATE: 67 BPM | DIASTOLIC BLOOD PRESSURE: 110 MMHG | OXYGEN SATURATION: 98 %

## 2020-05-29 LAB
FINAL PATHOLOGIC DIAGNOSIS: NORMAL
GROSS: NORMAL

## 2020-05-30 LAB — BACTERIA SPEC AEROBE CULT: NO GROWTH

## 2020-06-02 LAB — BACTERIA SPEC ANAEROBE CULT: NORMAL

## 2020-06-05 ENCOUNTER — OFFICE VISIT (OUTPATIENT)
Dept: SURGERY | Facility: CLINIC | Age: 60
End: 2020-06-05
Payer: MEDICARE

## 2020-06-05 VITALS
WEIGHT: 137.31 LBS | RESPIRATION RATE: 16 BRPM | TEMPERATURE: 98 F | BODY MASS INDEX: 23.44 KG/M2 | HEIGHT: 64 IN | SYSTOLIC BLOOD PRESSURE: 132 MMHG | HEART RATE: 76 BPM | OXYGEN SATURATION: 97 % | DIASTOLIC BLOOD PRESSURE: 85 MMHG

## 2020-06-05 DIAGNOSIS — Z48.89 ENCOUNTER FOR POSTOPERATIVE CARE: Primary | ICD-10-CM

## 2020-06-05 PROCEDURE — 99024 POSTOP FOLLOW-UP VISIT: CPT | Mod: S$GLB,,, | Performed by: SURGERY

## 2020-06-05 PROCEDURE — 99024 PR POST-OP FOLLOW-UP VISIT: ICD-10-PCS | Mod: S$GLB,,, | Performed by: SURGERY

## 2020-06-05 RX ORDER — OXYCODONE AND ACETAMINOPHEN 10; 325 MG/1; MG/1
1 TABLET ORAL EVERY 8 HOURS PRN
Qty: 20 TABLET | Refills: 0 | Status: SHIPPED | OUTPATIENT
Start: 2020-06-05 | End: 2020-06-18 | Stop reason: SDUPTHER

## 2020-06-05 NOTE — PROGRESS NOTES
"Subjective:       Patient ID: Janina Klein is a 60 y.o. female.    Chief Complaint: Post-op Evaluation (Lap nadir 5-26-20)      HPI:  Ms. Klein presents today after a recent uncomplicated laparoscopic cholecystectomy with complaints of periumbilical incisional pain.  She was doing well until she picked up her large dog a couple of days ago.  Since that time she has had recurrent periumbilical incisional pain and is requesting another prescription for analgesics..  She is not experiencing any fevers, chills, night sweats, nausea, vomiting, diarrhea, constipation, jaundice, biliuria, acholia, etc.  Appetite is excellent.  Activity tolerance has returned to normal.  Overall she feels "great".  No wound concerns.  No wound redness, drainage, swelling, odor, etc.      Allergies & Meds:  Review of patient's allergies indicates:   Allergen Reactions    Lisinopril        Current Outpatient Medications   Medication Sig Dispense Refill    albuterol-ipratropium 2.5mg-0.5mg/3mL (DUO-NEB) 0.5 mg-3 mg(2.5 mg base)/3 mL nebulizer solution USE 1 VIAL VIA NEBULIZER FOUR TIMES DAILY 360 mL 9    amLODIPine (NORVASC) 5 MG tablet TAKE 1 TABLET EVERY DAY 90 tablet 3    atorvastatin (LIPITOR) 20 MG tablet Take 1 tablet (20 mg total) by mouth once daily. 90 tablet 1    dicyclomine (BENTYL) 10 MG capsule Take 1 capsule (10 mg total) by mouth 4 (four) times daily before meals and nightly. 120 capsule 0    fluticasone (FLONASE) 50 mcg/actuation nasal spray 2 sprays (100 mcg total) by Each Nare route once daily. 3 Bottle 3    gabapentin (NEURONTIN) 800 MG tablet TAKE 1 TABLET THREE TIMES DAILY 270 tablet 1    metoprolol tartrate (LOPRESSOR) 50 MG tablet TAKE 1 TABLET TWICE DAILY 180 tablet 3    montelukast (SINGULAIR) 10 mg tablet TAKE 1 TABLET BY MOUTH EVERY DAY 90 tablet 1    ondansetron (ZOFRAN) 4 MG tablet Take 1 tablet (4 mg total) by mouth every 6 (six) hours as needed. 30 tablet 0    oxyCODONE-acetaminophen (PERCOCET)  " mg per tablet Take 1 tablet by mouth every 8 (eight) hours as needed. 20 tablet 0    pantoprazole (PROTONIX) 40 MG tablet TAKE 1 TABLET EVERY DAY 90 tablet 1    promethazine (PHENERGAN) 25 MG tablet Take 1 tablet (25 mg total) by mouth every 6 (six) hours as needed for Nausea. 30 tablet 0    sucralfate (CARAFATE) 1 gram tablet Take 1 tablet (1 g total) by mouth 4 (four) times daily. 120 tablet 11    tiotropium-olodaterol (STIOLTO RESPIMAT) 2.5-2.5 mcg/actuation Mist Inhale 2 puffs into the lungs once daily. Controller 12 g 3    albuterol 90 mcg/actuation inhaler Inhale 2 puffs into the lungs every 6 (six) hours as needed for Wheezing. 18 g 11     No current facility-administered medications for this visit.        PMFSHx:    Past Medical History:   Diagnosis Date    Anxiety     Arthritis     osteoarthritis    Chronic pain     Chronic pain     COPD (chronic obstructive pulmonary disease)     GERD (gastroesophageal reflux disease)     History of stomach ulcers     Hypertension     IBS (irritable bowel syndrome)     [facility] Diagnosed 2004    Palpitations        Past Surgical History:   Procedure Laterality Date    APPENDECTOMY       SECTION      COLONOSCOPY      ? results in [place]    COLONOSCOPY  2015    Dr. Farfan   diverticulosis    COLONOSCOPY N/A 2020    Procedure: COLONOSCOPY;  Surgeon: Casey Farfan MD;  Location: [facility] ENDO;  Service: General;  Laterality: N/A;    CYSTOSCOPY      ESOPHAGOGASTRODUODENOSCOPY N/A 2020    Procedure: ESOPHAGOGASTRODUODENOSCOPY (EGD);  Surgeon: Casey Farfan MD;  Location: [facility] ENDO;  Service: General;  Laterality: N/A;  WITH BXS    HERNIA REPAIR      HYSTERECTOMY  9/3/2014    with bso    LAPAROSCOPIC CHOLECYSTECTOMY N/A 2020    Procedure: CHOLECYSTECTOMY-LAPAROSCOPIC;  Surgeon: Casey Farfan MD;  Location: [facility] OR;  Service: General;  Laterality: N/A;    TOTAL ABDOMINAL HYSTERECTOMY W/ BILATERAL  SALPINGOOPHORECTOMY Bilateral        Family History   Problem Relation Age of Onset    Hypertension Mother     Diabetes Sister     Colon cancer Maternal Grandmother     Breast cancer Other        Social History     Tobacco Use    Smoking status: Current Every Day Smoker     Packs/day: 0.50     Types: Cigarettes    Smokeless tobacco: Never Used   Substance Use Topics    Alcohol use: No     Frequency: Never     Binge frequency: Never    Drug use: No         Review of Systems   Constitutional: Negative for appetite change, chills, fatigue, fever and unexpected weight change.   HENT: Negative for congestion, dental problem, ear pain, mouth sores, postnasal drip, rhinorrhea, sore throat, tinnitus, trouble swallowing and voice change.    Eyes: Negative for photophobia, pain, discharge and visual disturbance.   Respiratory: Negative for cough, chest tightness, shortness of breath and wheezing.    Cardiovascular: Negative for chest pain, palpitations and leg swelling.   Gastrointestinal: Negative for abdominal pain, blood in stool, constipation, diarrhea, nausea and vomiting.   Endocrine: Negative for cold intolerance, heat intolerance, polydipsia, polyphagia and polyuria.   Genitourinary: Negative for difficulty urinating, dysuria, flank pain, frequency, hematuria and urgency.   Musculoskeletal: Negative for arthralgias, joint swelling and myalgias.   Skin: Negative for color change and rash.   Allergic/Immunologic: Negative for immunocompromised state.   Neurological: Negative for dizziness, tremors, seizures, syncope, speech difficulty, weakness, numbness and headaches.   Hematological: Negative for adenopathy. Does not bruise/bleed easily.   Psychiatric/Behavioral: Negative for agitation, confusion, hallucinations, self-injury and suicidal ideas. The patient is not nervous/anxious.        Objective:      Physical Exam   Constitutional: Vital signs are normal. She is active. No distress (no acute distress).    Cardiovascular: Normal rate, regular rhythm and normal heart sounds.   Pulmonary/Chest: Effort normal and breath sounds normal. Respiratory distress: no respiratory distress.   Abdominal: Soft. Normal appearance and bowel sounds are normal. Distention: nondistended. Tenderness: nontender.   Neurological: She is alert.   Skin: Skin is warm, dry and intact.   Incisions healed well without erythema, edema, exudate, induration, fluctuance, crepitus, necrosis, discoloration, separation         Medical Records Review:  Pathology report reviewed from 5/26/2020.  Mild chronic cholecystitis was noted; no report of malignancy, atypia, dysplasia, etc.      Assessment:       Satisfactory recovery.  No evident complications.  Incisions healed.    1. Encounter for postoperative care          Plan:     Encounter for postoperative care    Other orders  -     oxyCODONE-acetaminophen (PERCOCET)  mg per tablet; Take 1 tablet by mouth every 8 (eight) hours as needed.  Dispense: 20 tablet; Refill: 0          Follow up for any concerns or questions as needed, resume care with PCP.

## 2020-06-19 RX ORDER — OXYCODONE AND ACETAMINOPHEN 10; 325 MG/1; MG/1
1 TABLET ORAL EVERY 12 HOURS PRN
Qty: 10 TABLET | Refills: 0 | Status: SHIPPED | OUTPATIENT
Start: 2020-06-19 | End: 2020-06-30 | Stop reason: SDUPTHER

## 2020-06-19 NOTE — TELEPHONE ENCOUNTER
Ms. Klein needs to be seen in clinic before another prescription after this one will be provided.  No reason for her to still need pain medication 3 weeks after a cholecystectomy.  If she is still having pain she needs further evaluation.

## 2020-06-30 DIAGNOSIS — Z48.89 ENCOUNTER FOR POSTOPERATIVE CARE: Primary | ICD-10-CM

## 2020-06-30 RX ORDER — OXYCODONE AND ACETAMINOPHEN 10; 325 MG/1; MG/1
1 TABLET ORAL EVERY 12 HOURS PRN
Qty: 10 TABLET | Refills: 0 | Status: SHIPPED | OUTPATIENT
Start: 2020-06-30 | End: 2020-07-10 | Stop reason: SDUPTHER

## 2020-07-06 RX ORDER — OXYCODONE AND ACETAMINOPHEN 10; 325 MG/1; MG/1
1 TABLET ORAL EVERY 12 HOURS PRN
Qty: 10 TABLET | Refills: 0 | OUTPATIENT
Start: 2020-07-06

## 2020-07-07 RX ORDER — OXYCODONE AND ACETAMINOPHEN 10; 325 MG/1; MG/1
1 TABLET ORAL EVERY 12 HOURS PRN
Qty: 10 TABLET | Refills: 0 | OUTPATIENT
Start: 2020-07-07

## 2020-07-07 NOTE — TELEPHONE ENCOUNTER
Called patient. Schedule appointment for medication refill. Instructions given verbally. Stated understanding.

## 2020-07-08 RX ORDER — OXYCODONE AND ACETAMINOPHEN 10; 325 MG/1; MG/1
1 TABLET ORAL EVERY 12 HOURS PRN
Qty: 10 TABLET | Refills: 0 | OUTPATIENT
Start: 2020-07-08

## 2020-07-10 ENCOUNTER — OFFICE VISIT (OUTPATIENT)
Dept: SURGERY | Facility: CLINIC | Age: 60
End: 2020-07-10
Payer: MEDICARE

## 2020-07-10 VITALS
HEART RATE: 70 BPM | WEIGHT: 141.31 LBS | DIASTOLIC BLOOD PRESSURE: 98 MMHG | RESPIRATION RATE: 16 BRPM | TEMPERATURE: 96 F | HEIGHT: 64 IN | SYSTOLIC BLOOD PRESSURE: 153 MMHG | OXYGEN SATURATION: 97 % | BODY MASS INDEX: 24.13 KG/M2

## 2020-07-10 DIAGNOSIS — S30.1XXA CONTUSION OF ABDOMINAL WALL, INITIAL ENCOUNTER: Primary | ICD-10-CM

## 2020-07-10 PROCEDURE — 3077F SYST BP >= 140 MM HG: CPT | Mod: CPTII,S$GLB,, | Performed by: SURGERY

## 2020-07-10 PROCEDURE — 3008F BODY MASS INDEX DOCD: CPT | Mod: CPTII,S$GLB,, | Performed by: SURGERY

## 2020-07-10 PROCEDURE — 3080F PR MOST RECENT DIASTOLIC BLOOD PRESSURE >= 90 MM HG: ICD-10-PCS | Mod: CPTII,S$GLB,, | Performed by: SURGERY

## 2020-07-10 PROCEDURE — 99213 OFFICE O/P EST LOW 20 MIN: CPT | Mod: 24,S$GLB,, | Performed by: SURGERY

## 2020-07-10 PROCEDURE — 3077F PR MOST RECENT SYSTOLIC BLOOD PRESSURE >= 140 MM HG: ICD-10-PCS | Mod: CPTII,S$GLB,, | Performed by: SURGERY

## 2020-07-10 PROCEDURE — 99213 PR OFFICE/OUTPT VISIT, EST, LEVL III, 20-29 MIN: ICD-10-PCS | Mod: 24,S$GLB,, | Performed by: SURGERY

## 2020-07-10 PROCEDURE — 3008F PR BODY MASS INDEX (BMI) DOCUMENTED: ICD-10-PCS | Mod: CPTII,S$GLB,, | Performed by: SURGERY

## 2020-07-10 PROCEDURE — 3080F DIAST BP >= 90 MM HG: CPT | Mod: CPTII,S$GLB,, | Performed by: SURGERY

## 2020-07-10 RX ORDER — OXYCODONE AND ACETAMINOPHEN 10; 325 MG/1; MG/1
1 TABLET ORAL EVERY 12 HOURS PRN
Qty: 10 TABLET | Refills: 0 | Status: SHIPPED | OUTPATIENT
Start: 2020-07-10 | End: 2020-07-20

## 2020-07-10 NOTE — PROGRESS NOTES
Subjective:       Patient ID: Janina Klein is a 60 y.o. female.    Chief Complaint: Follow-up (Med refill)      HPI:  Ms. Klein recently contacted the office requesting additional pain medication.  She had a laparoscopic cholecystectomy on 5/26/2020.  Since the initial postoperative analgesia prescription she has had 2 additional tapering prescriptions for narcotics to manage postoperative pain.  When she contacted the office the impression was that she was still experiencing abdominal postoperative pain.  Today she relates that she has new onset pain related to blunt abdominal trauma sustained when a dog attacked her and knocked her down.  This incident happened about a week ago.  From the standpoint of her cholecystectomy she is doing extremely well.  All preop symptoms have completely resolved.  She is not experiencing any nausea, vomiting, diarrhea, constipation, melena, hematochezia, hematemesis, fevers, chills, jaundice, biliuria, acholia, etc.  She has no wound concerns.      Allergies & Meds:  Review of patient's allergies indicates:   Allergen Reactions    Lisinopril        Current Outpatient Medications   Medication Sig Dispense Refill    albuterol-ipratropium 2.5mg-0.5mg/3mL (DUO-NEB) 0.5 mg-3 mg(2.5 mg base)/3 mL nebulizer solution USE 1 VIAL VIA NEBULIZER FOUR TIMES DAILY 360 mL 9    amLODIPine (NORVASC) 5 MG tablet TAKE 1 TABLET EVERY DAY 90 tablet 3    atorvastatin (LIPITOR) 20 MG tablet Take 1 tablet (20 mg total) by mouth once daily. 90 tablet 1    fluticasone (FLONASE) 50 mcg/actuation nasal spray 2 sprays (100 mcg total) by Each Nare route once daily. 3 Bottle 3    gabapentin (NEURONTIN) 800 MG tablet TAKE 1 TABLET THREE TIMES DAILY 270 tablet 1    metoprolol tartrate (LOPRESSOR) 50 MG tablet TAKE 1 TABLET TWICE DAILY 180 tablet 3    montelukast (SINGULAIR) 10 mg tablet TAKE 1 TABLET BY MOUTH EVERY DAY 90 tablet 1    ondansetron (ZOFRAN) 4 MG tablet Take 1 tablet (4 mg total) by mouth  every 6 (six) hours as needed. 30 tablet 0    oxyCODONE-acetaminophen (PERCOCET)  mg per tablet Take 1 tablet by mouth every 12 (twelve) hours as needed. 10 tablet 0    pantoprazole (PROTONIX) 40 MG tablet TAKE 1 TABLET EVERY DAY 90 tablet 1    promethazine (PHENERGAN) 25 MG tablet Take 1 tablet (25 mg total) by mouth every 6 (six) hours as needed for Nausea. 30 tablet 0    sucralfate (CARAFATE) 1 gram tablet Take 1 tablet (1 g total) by mouth 4 (four) times daily. 120 tablet 11    tiotropium-olodaterol (STIOLTO RESPIMAT) 2.5-2.5 mcg/actuation Mist Inhale 2 puffs into the lungs once daily. Controller 12 g 3    albuterol 90 mcg/actuation inhaler Inhale 2 puffs into the lungs every 6 (six) hours as needed for Wheezing. 18 g 11     No current facility-administered medications for this visit.        PMFSHx:    Past Medical History:   Diagnosis Date    Anxiety     Arthritis     osteoarthritis    Chronic pain     Chronic pain     COPD (chronic obstructive pulmonary disease)     GERD (gastroesophageal reflux disease)     History of stomach ulcers     Hypertension     IBS (irritable bowel syndrome)     [facility] Diagnosed 2004    Palpitations        Past Surgical History:   Procedure Laterality Date    APPENDECTOMY       SECTION      [place]OS[place]      ? [place] in [place]    COLONOSCOPY  2015    Dr. Farfan   diverticulosis    COLONOSCOPY N/A 2020    Procedure: COLONOSCOPY;  Surgeon: Casey Farfan MD;  Location: [facility] ENDO;  Service: General;  Laterality: N/A;    CYSTOSCOPY      ESOPHAGOGASTRODUODENOSCOPY N/A 2020    Procedure: ESOPHAGOGASTRODUODENOSCOPY (EGD);  Surgeon: Casey Farfan MD;  Location: [facility] ENDO;  Service: General;  Laterality: N/A;  WITH BXS    HERNIA REPAIR      HYSTERECTOMY  9/3/2014    with bso    LAPAROSCOPIC CHOLECYSTECTOMY N/A 2020    Procedure: CHOLECYSTECTOMY-LAPAROSCOPIC;  Surgeon: Casey Farafn MD;  Location: [facility] OR;   Service: General;  Laterality: N/A;    TOTAL ABDOMINAL HYSTERECTOMY W/ BILATERAL SALPINGOOPHORECTOMY Bilateral        Family History   Problem Relation Age of Onset    Hypertension Mother     Diabetes Sister     Colon cancer Maternal Grandmother     Breast cancer Other        Social History     Tobacco Use    Smoking status: Current Every Day Smoker     Packs/day: 0.50     Types: Cigarettes    Smokeless tobacco: Never Used   Substance Use Topics    Alcohol use: No     Frequency: Never     Binge frequency: Never    Drug use: No         Review of Systems   Constitutional: Negative for appetite change, chills, fatigue, fever and unexpected weight change.   HENT: Negative for congestion, dental problem, ear pain, mouth sores, postnasal drip, rhinorrhea, sore throat, tinnitus, trouble swallowing and voice change.    Eyes: Negative for photophobia, pain, discharge and visual disturbance.   Respiratory: Negative for cough, chest tightness, shortness of breath and wheezing.    Cardiovascular: Negative for chest pain, palpitations and leg swelling.   Gastrointestinal: Negative for blood in stool, constipation, diarrhea, nausea and vomiting.   Endocrine: Negative for cold intolerance, heat intolerance, polydipsia, polyphagia and polyuria.   Genitourinary: Negative for difficulty urinating, dysuria, flank pain, frequency, hematuria and urgency.   Musculoskeletal: Negative for arthralgias, joint swelling and myalgias.   Skin: Negative for color change and rash.   Allergic/Immunologic: Negative for immunocompromised state.   Neurological: Negative for dizziness, tremors, seizures, syncope, speech difficulty, weakness, numbness and headaches.   Hematological: Negative for adenopathy. Does not bruise/bleed easily.   Psychiatric/Behavioral: Negative for agitation, confusion, hallucinations, self-injury and suicidal ideas. The patient is not nervous/anxious.        Objective:      Physical Exam  Constitutional:        General: She is not in acute distress.     Appearance: Normal appearance. She is well-developed. She is not ill-appearing or toxic-appearing.      Comments: Body mass index is 24.25 kg/m².   HENT:      Head: Normocephalic and atraumatic.      Right Ear: Hearing and external ear normal.      Left Ear: Hearing and external ear normal.      Nose: Nose normal.   Eyes:      General: Lids are normal. No scleral icterus.     Conjunctiva/sclera: Conjunctivae normal.   Neck:      Musculoskeletal: Normal range of motion and neck supple.      Thyroid: No thyroid mass or thyromegaly.      Vascular: No carotid bruit or JVD.      Trachea: Trachea and phonation normal.   Cardiovascular:      Rate and Rhythm: Normal rate and regular rhythm.      Chest Wall: PMI is not displaced.      Heart sounds: Normal heart sounds. No murmur. No gallop.    Pulmonary:      Effort: Pulmonary effort is normal. No tachypnea, accessory muscle usage or respiratory distress.      Breath sounds: Normal breath sounds.   Abdominal:      General: Bowel sounds are normal.      Palpations: Abdomen is soft.      Tenderness: There is no abdominal tenderness.      Hernia: No hernia is present.   Lymphadenopathy:      Cervical: No cervical adenopathy.      Upper Body:      Right upper body: No supraclavicular adenopathy.      Left upper body: No supraclavicular adenopathy.   Skin:     General: Skin is warm and dry.      Findings: No rash.      Nails: There is no clubbing.        Comments: Lap Reina incisions healed   Neurological:      Mental Status: She is alert and oriented to person, place, and time. She is not disoriented.      GCS: GCS eye subscore is 4. GCS verbal subscore is 5. GCS motor subscore is 6.   Psychiatric:         Attention and Perception: She is attentive.         Speech: Speech normal.         Behavior: Behavior normal.         Thought Content: Thought content normal.         Judgment: Judgment normal.           Medical Records  Review:      Assessment:         1. Contusion of abdominal wall, initial encounter          Plan:     Contusion of abdominal wall, initial encounter    Other orders  -     oxyCODONE-acetaminophen (PERCOCET)  mg per tablet; Take 1 tablet by mouth every 12 (twelve) hours as needed.  Dispense: 10 tablet; Refill: 0      MPMP reviewed.  No evident contraindication to providing a small quantity prescription for analgesics to manage this acute contusion discomfort/pain until such time that she can see her PCP.    Follow up for any concerns or questions as needed, resume care with PCP.    Counseling/Medical Decision Making:  Ms. Klein was counseled regarding ongoing healthcare.  She was encouraged to initiate care for all new problems with her PCP and to follow up with her PCP at this time.  She understands that she should call this clinic for any issue that she believes is related to her surgery.    Total face-to-face encounter time was 25 minutes, 15 minutes spent counseling as outlined/summarized above.

## 2020-07-20 PROBLEM — Z90.49 HISTORY OF LAPAROSCOPIC CHOLECYSTECTOMY: Status: ACTIVE | Noted: 2020-05-26

## 2020-08-03 ENCOUNTER — LAB VISIT (OUTPATIENT)
Dept: LAB | Facility: HOSPITAL | Age: 60
End: 2020-08-03
Attending: INTERNAL MEDICINE
Payer: MEDICAID

## 2020-08-03 ENCOUNTER — OFFICE VISIT (OUTPATIENT)
Dept: GASTROENTEROLOGY | Facility: CLINIC | Age: 60
End: 2020-08-03
Payer: MEDICARE

## 2020-08-03 VITALS
TEMPERATURE: 98 F | DIASTOLIC BLOOD PRESSURE: 94 MMHG | HEIGHT: 64 IN | WEIGHT: 144 LBS | HEART RATE: 77 BPM | RESPIRATION RATE: 18 BRPM | OXYGEN SATURATION: 96 % | BODY MASS INDEX: 24.59 KG/M2 | SYSTOLIC BLOOD PRESSURE: 141 MMHG

## 2020-08-03 DIAGNOSIS — R10.84 GENERALIZED ABDOMINAL PAIN: ICD-10-CM

## 2020-08-03 DIAGNOSIS — K44.9 HIATAL HERNIA: ICD-10-CM

## 2020-08-03 DIAGNOSIS — Z90.49 HISTORY OF LAPAROSCOPIC CHOLECYSTECTOMY: ICD-10-CM

## 2020-08-03 DIAGNOSIS — K29.50 OTHER CHRONIC GASTRITIS WITHOUT HEMORRHAGE: ICD-10-CM

## 2020-08-03 DIAGNOSIS — K20.90 ESOPHAGITIS: ICD-10-CM

## 2020-08-03 DIAGNOSIS — K21.9 GASTROESOPHAGEAL REFLUX DISEASE, ESOPHAGITIS PRESENCE NOT SPECIFIED: Primary | ICD-10-CM

## 2020-08-03 DIAGNOSIS — R19.7 DIARRHEA, UNSPECIFIED TYPE: ICD-10-CM

## 2020-08-03 DIAGNOSIS — R10.11 RIGHT UPPER QUADRANT ABDOMINAL PAIN: ICD-10-CM

## 2020-08-03 DIAGNOSIS — K57.30 DIVERTICULOSIS OF COLON: ICD-10-CM

## 2020-08-03 LAB
CRP SERPL-MCNC: 0.23 MG/DL (ref 0–0.75)
T4 FREE SERPL-MCNC: 0.77 NG/DL (ref 0.71–1.51)
TSH SERPL DL<=0.005 MIU/L-ACNC: 1.62 UIU/ML (ref 0.34–5.6)

## 2020-08-03 PROCEDURE — 36415 COLL VENOUS BLD VENIPUNCTURE: CPT

## 2020-08-03 PROCEDURE — 84443 ASSAY THYROID STIM HORMONE: CPT

## 2020-08-03 PROCEDURE — 3077F PR MOST RECENT SYSTOLIC BLOOD PRESSURE >= 140 MM HG: ICD-10-PCS | Mod: CPTII,S$GLB,, | Performed by: INTERNAL MEDICINE

## 2020-08-03 PROCEDURE — 86140 C-REACTIVE PROTEIN: CPT

## 2020-08-03 PROCEDURE — 83516 IMMUNOASSAY NONANTIBODY: CPT | Mod: 59

## 2020-08-03 PROCEDURE — 3080F PR MOST RECENT DIASTOLIC BLOOD PRESSURE >= 90 MM HG: ICD-10-PCS | Mod: CPTII,S$GLB,, | Performed by: INTERNAL MEDICINE

## 2020-08-03 PROCEDURE — 3080F DIAST BP >= 90 MM HG: CPT | Mod: CPTII,S$GLB,, | Performed by: INTERNAL MEDICINE

## 2020-08-03 PROCEDURE — 3008F BODY MASS INDEX DOCD: CPT | Mod: CPTII,S$GLB,, | Performed by: INTERNAL MEDICINE

## 2020-08-03 PROCEDURE — 99204 OFFICE O/P NEW MOD 45 MIN: CPT | Mod: S$GLB,,, | Performed by: INTERNAL MEDICINE

## 2020-08-03 PROCEDURE — 99999 PR PBB SHADOW E&M-EST. PATIENT-LVL V: ICD-10-PCS | Mod: PBBFAC,,, | Performed by: INTERNAL MEDICINE

## 2020-08-03 PROCEDURE — 3008F PR BODY MASS INDEX (BMI) DOCUMENTED: ICD-10-PCS | Mod: CPTII,S$GLB,, | Performed by: INTERNAL MEDICINE

## 2020-08-03 PROCEDURE — 99204 PR OFFICE/OUTPT VISIT, NEW, LEVL IV, 45-59 MIN: ICD-10-PCS | Mod: S$GLB,,, | Performed by: INTERNAL MEDICINE

## 2020-08-03 PROCEDURE — 3077F SYST BP >= 140 MM HG: CPT | Mod: CPTII,S$GLB,, | Performed by: INTERNAL MEDICINE

## 2020-08-03 PROCEDURE — 99999 PR PBB SHADOW E&M-EST. PATIENT-LVL V: CPT | Mod: PBBFAC,,, | Performed by: INTERNAL MEDICINE

## 2020-08-03 PROCEDURE — 84439 ASSAY OF FREE THYROXINE: CPT

## 2020-08-03 NOTE — PROGRESS NOTES
"Subjective:       Patient ID: Janina Klein is a 60 y.o. female.    Chief Complaint: Abdominal Pain    She complains of abdominal pain and diarrhea.  She states she was diagnosed as having irritable bowel in 2004 but has had diarrhea for 30 or 40 years.  in the last 2 years she has lost from 180 lb to approximately 140 lb.  Because of "be cause if I eat something I will pay for it ".  She states she is having postprandial diarrhea.  She cannot pinpoint a specific food.  She has had occasional nausea without vomiting.  She denies fever chills hematemesis hematochezia jaundice or bleeding.  She has multiple bowel movements during the day with urgency and incontinence.  They are not nocturnal.  She uses the Imodium.  For the abdominal cramping she uses the dicyclomine.  She describes having diffuse abdominal pain.  In 2015 she underwent umbilical hernia surgery and appendectomy and a hysterectomy in 1 sitting.  She states since then she has had abdominal pain.  The pain is sometimes related to the diarrhea.  It signals the urgency today have a bowel movement.  She has had dyspepsia and pyrosis.  Upper endoscopy and colonoscopy were performed in January.  She had a cholecystectomy in February.  She denies significant skin or joint symptoms except for mild arthralgias.  The mother had "stomach issues ".  The mother had diarrhea and had a partial colectomy for diverticulitis.  The grandmother had colon cancer in her late 40s.  She denies dysphagia aspiration.  She was found to have gastroesophageal reflux hiatal hernia and diverticulosis and internal hemorrhoids.      Allergies:  Review of patient's allergies indicates:   Allergen Reactions    Lisinopril        Medications:    Current Outpatient Medications:     albuterol 90 mcg/actuation inhaler, Inhale 2 puffs into the lungs every 6 (six) hours as needed for Wheezing., Disp: 18 g, Rfl: 11    albuterol-ipratropium 2.5mg-0.5mg/3mL (DUO-NEB) 0.5 mg-3 mg(2.5 mg base)/3 " mL nebulizer solution, USE 1 VIAL VIA NEBULIZER FOUR TIMES DAILY, Disp: 360 mL, Rfl: 9    amLODIPine (NORVASC) 5 MG tablet, TAKE 1 TABLET EVERY DAY, Disp: 90 tablet, Rfl: 3    atorvastatin (LIPITOR) 20 MG tablet, Take 1 tablet (20 mg total) by mouth once daily., Disp: 90 tablet, Rfl: 1    gabapentin (NEURONTIN) 800 MG tablet, TAKE 1 TABLET THREE TIMES DAILY, Disp: 270 tablet, Rfl: 1    metoprolol tartrate (LOPRESSOR) 50 MG tablet, TAKE 1 TABLET TWICE DAILY, Disp: 180 tablet, Rfl: 3    montelukast (SINGULAIR) 10 mg tablet, TAKE 1 TABLET BY MOUTH EVERY DAY, Disp: 90 tablet, Rfl: 1    ondansetron (ZOFRAN) 4 MG tablet, Take 1 tablet (4 mg total) by mouth every 6 (six) hours as needed., Disp: 30 tablet, Rfl: 0    pantoprazole (PROTONIX) 40 MG tablet, TAKE 1 TABLET EVERY DAY, Disp: 90 tablet, Rfl: 1    sucralfate (CARAFATE) 1 gram tablet, Take 1 tablet (1 g total) by mouth 4 (four) times daily., Disp: 120 tablet, Rfl: 11    tiotropium-olodaterol (STIOLTO RESPIMAT) 2.5-2.5 mcg/actuation Mist, Inhale 2 puffs into the lungs once daily. Controller, Disp: 12 g, Rfl: 3    Past Medical History:   Diagnosis Date    Anxiety     Arthritis     osteoarthritis    Chronic pain     Chronic pain     COPD (chronic obstructive pulmonary disease)     GERD (gastroesophageal reflux disease)     History of stomach ulcers     Hypertension     IBS (irritable bowel syndrome)     [facility] Diagn[facility] 2004    Palpitations        Past Surgical History:   Procedure Laterality Date    APPENDECTOMY       SECTION      [place]OSCOPY      ? [place] in [place]    COLONOSCOPY  2015    Dr. Farfan   diverticulosis    COLONOSCOPY N/A 2020    Procedure: COLONOSCOPY;  Surgeon: Casey Farfan MD;  Location: [facility];  Service: General;  Laterality: N/A;    CYSTOSCOPY      ESOPHAGOGASTRODUODENOSCOPY N/A 2020    Procedure: ESOPHAGOGASTRODUODENOSCOPY (EGD);  Surgeon: Casey Farfan MD;  Location:  [facility] ENDO;  Service: General;  Laterality: N/A;  WITH BXS    HERNIA REPAIR      HYSTERECTOMY  9/3/2014    with bso    LAPAROSCOPIC CHOLECYSTECTOMY N/A 5/26/2020    Procedure: CHOLECYSTECTOMY-LAPAROSCOPIC;  Surgeon: Casey Farfan MD;  Location: [facility] OR;  Service: General;  Laterality: N/A;    TOTAL ABDOMINAL HYSTERECTOMY W/ BILATERAL SALPINGOOPHORECTOMY Bilateral          Review of Systems   Constitutional: Positive for appetite change and unexpected weight change. Negative for fever.   HENT: Negative for trouble swallowing.         No jaundice.   Respiratory: Negative for cough, shortness of breath and wheezing.         She is a daily cigarette smoker.  She does not use alcohol.  She denies dysphagia aspiration or hemoptysis.  She has been offered the smoking cessation program in the past and states she will consider this option.  She has her pulmonary inhalers.  She has been told in the past she has chronic lung disease.  She has minimal coughing and minimal sputum production.  She denies dyspnea on exertion.   Cardiovascular: Negative for chest pain.        She denies exertional chest pain or rhythm disturbance.   Gastrointestinal: Positive for abdominal pain, diarrhea and nausea. Negative for abdominal distention, anal bleeding, blood in stool, constipation and rectal pain.   Musculoskeletal: Positive for arthralgias and back pain. Negative for neck pain.   Skin: Negative for pallor and rash.   Neurological: Negative for dizziness, seizures, syncope, speech difficulty, weakness and numbness.   Hematological: Negative for adenopathy.   Psychiatric/Behavioral: Negative for confusion.       Objective:      Physical Exam  Vitals signs reviewed.   Constitutional:       Appearance: She is well-developed.      Comments: Well-nourished well-hydrated afebrile nonicteric white female.  She is oriented x3.  She can relate her history and answer questions appropriately.  She is sitting comfortably in the chair.  She  is normocephalic.  Pupils are normal.   HENT:      Head: Normocephalic.   Eyes:      Pupils: Pupils are equal, round, and reactive to light.   Neck:      Musculoskeletal: Normal range of motion and neck supple.      Thyroid: No thyromegaly.      Trachea: No tracheal deviation.   Cardiovascular:      Rate and Rhythm: Normal rate and regular rhythm.      Heart sounds: Normal heart sounds.   Pulmonary:      Effort: Pulmonary effort is normal.      Breath sounds: Normal breath sounds.   Abdominal:      General: Bowel sounds are normal. There is no distension.      Palpations: Abdomen is soft. There is no mass.      Tenderness: There is abdominal tenderness. There is no right CVA tenderness, left CVA tenderness, guarding or rebound.      Hernia: No hernia is present.      Comments: The abdomen is soft there healed scars.  There is tenderness in the epigastrium.  Organomegaly masses are not detected.  Bowel sounds are normal.   Musculoskeletal: Normal range of motion.      Comments: She can ambulate normally.  She can go from the sitting the standing position without difficulty.  She get on the exam table without difficulty or assistance.   Lymphadenopathy:      Cervical: No cervical adenopathy.   Skin:     General: Skin is warm and dry.   Neurological:      Mental Status: She is alert and oriented to person, place, and time.      Cranial Nerves: No cranial nerve deficit.   Psychiatric:         Behavior: Behavior normal.           Plan:       Gastroesophageal reflux disease, esophagitis presence not specified  -     FL Upper GI; Future; Expected date: 08/03/2020    Generalized abdominal pain  -     Ambulatory referral/consult to Gastroenterology  -     Celiac Disease Panel; Future; Expected date: 08/03/2020  -     TSH; Future; Expected date: 08/03/2020  -     T4, free; Future; Expected date: 08/03/2020  -     C-reactive protein; Future; Expected date: 08/03/2020  -     Gastrointestinal Pathogens Panel, PCR; Future;  Expected date: 08/03/2020  -     Pancreatic elastase, fecal; Future; Expected date: 08/03/2020  -     Calprotectin; Future; Expected date: 08/03/2020    Diarrhea, unspecified type  -     Celiac Disease Panel; Future; Expected date: 08/03/2020  -     TSH; Future; Expected date: 08/03/2020  -     T4, free; Future; Expected date: 08/03/2020  -     C-reactive protein; Future; Expected date: 08/03/2020  -     Gastrointestinal Pathogens Panel, PCR; Future; Expected date: 08/03/2020  -     Pancreatic elastase, fecal; Future; Expected date: 08/03/2020  -     Calprotectin; Future; Expected date: 08/03/2020    Right upper quadrant abdominal pain    Hiatal hernia    Other chronic gastritis without hemorrhage    Esophagitis    Diverticulosis of colon    History of laparoscopic cholecystectomy     She will continue her reflux regimen current medications.  She will make a food diary and avoid the offending foods.  Initially upper GI x-rays series labs and stool studies will be obtained.

## 2020-08-03 NOTE — LETTER
August 3, 2020      Lisa Y. Cooksey, AYESHA  [address]  [place]  [place] [address]           [facility]  [address]  [place] [address]  Phone: [phone]  Fax: [phone]          Patient: Janina Klein   MR Number: [id number]   YOB: 1960   Date of Visit: 8/3/2020       Dear Lisa Y. Cooksey:    Thank you for referring Janina Klein to me for evaluation. Attached you will find relevant portions of my assessment and plan of care.    If you have questions, please do not hesitate to call me. I look forward to following Janina Klein along with you.    Sincerely,    Mike Tong MD    Enclosure  CC:  No Recipients    If you would like to receive this communication electronically, please contact [email or web address] or [phone] to request more information on [employer] Link access.    For providers and/or their staff who would like to refer a patient to [facility], please contact us through our one-stop-shop provider referral line, [facility], at [phone].    If you feel you have received this communication in error or would no longer like to receive these types of communications, please e-mail [email or web address]

## 2020-08-03 NOTE — PATIENT INSTRUCTIONS
She is scheduled for laboratory and stool evaluation for the diarrhea.  Additionally x-rays to evaluate the abdominal pain.  She will make a food diary and avoid the offending foods.  She will continue current medications vitamins and minerals.

## 2020-08-06 LAB
GLIADIN PEPTIDE IGA SER-ACNC: 10 UNITS
GLIADIN PEPTIDE IGG SER-ACNC: 1 UNITS
IGA SERPL-MCNC: 182 MG/DL (ref 70–400)
TTG IGA SER-ACNC: 4 UNITS
TTG IGG SER-ACNC: 2 UNITS

## 2020-08-12 ENCOUNTER — HOSPITAL ENCOUNTER (OUTPATIENT)
Dept: RADIOLOGY | Facility: HOSPITAL | Age: 60
Discharge: HOME | End: 2020-08-12
Attending: INTERNAL MEDICINE
Payer: MEDICARE

## 2020-08-12 DIAGNOSIS — K21.9 GASTROESOPHAGEAL REFLUX DISEASE, ESOPHAGITIS PRESENCE NOT SPECIFIED: ICD-10-CM

## 2020-08-12 PROCEDURE — 74240 X-RAY XM UPR GI TRC 1CNTRST: CPT | Mod: 26,,, | Performed by: RADIOLOGY

## 2020-08-12 PROCEDURE — 25500020 PHARM REV CODE 255: Performed by: INTERNAL MEDICINE

## 2020-08-12 PROCEDURE — 74240 X-RAY XM UPR GI TRC 1CNTRST: CPT | Mod: TC,FY

## 2020-08-12 PROCEDURE — A9698 NON-RAD CONTRAST MATERIALNOC: HCPCS | Performed by: INTERNAL MEDICINE

## 2020-08-12 PROCEDURE — 74240 FL UPPER GI: ICD-10-PCS | Mod: 26,,, | Performed by: RADIOLOGY

## 2020-08-12 RX ADMIN — BARIUM SULFATE 355 ML: 0.6 SUSPENSION ORAL at 10:08

## 2020-08-12 RX ADMIN — BARIUM SULFATE 135 ML: 980 POWDER, FOR SUSPENSION ORAL at 10:08

## 2020-08-13 DIAGNOSIS — K22.2 ESOPHAGEAL STRICTURE: Primary | ICD-10-CM

## 2020-08-13 DIAGNOSIS — K44.9 HIATAL HERNIA: ICD-10-CM

## 2020-08-13 DIAGNOSIS — Z01.818 PREOP TESTING: ICD-10-CM

## 2020-08-17 ENCOUNTER — LAB VISIT (OUTPATIENT)
Dept: LAB | Facility: HOSPITAL | Age: 60
End: 2020-08-17
Attending: INTERNAL MEDICINE
Payer: MEDICARE

## 2020-08-17 ENCOUNTER — OFFICE VISIT (OUTPATIENT)
Dept: GASTROENTEROLOGY | Facility: CLINIC | Age: 60
End: 2020-08-17
Payer: MEDICARE

## 2020-08-17 VITALS
HEIGHT: 64 IN | HEART RATE: 68 BPM | OXYGEN SATURATION: 98 % | SYSTOLIC BLOOD PRESSURE: 161 MMHG | WEIGHT: 141 LBS | TEMPERATURE: 98 F | BODY MASS INDEX: 24.07 KG/M2 | RESPIRATION RATE: 18 BRPM | DIASTOLIC BLOOD PRESSURE: 95 MMHG

## 2020-08-17 DIAGNOSIS — Z90.49 HISTORY OF LAPAROSCOPIC CHOLECYSTECTOMY: ICD-10-CM

## 2020-08-17 DIAGNOSIS — R13.10 DYSPHAGIA, UNSPECIFIED TYPE: ICD-10-CM

## 2020-08-17 DIAGNOSIS — K22.2 ESOPHAGEAL STRICTURE: Primary | ICD-10-CM

## 2020-08-17 DIAGNOSIS — K44.9 HIATAL HERNIA: ICD-10-CM

## 2020-08-17 DIAGNOSIS — K57.30 DIVERTICULOSIS OF COLON: ICD-10-CM

## 2020-08-17 DIAGNOSIS — K21.00 GASTROESOPHAGEAL REFLUX DISEASE WITH ESOPHAGITIS: Primary | ICD-10-CM

## 2020-08-17 DIAGNOSIS — Z01.818 PREOP TESTING: ICD-10-CM

## 2020-08-17 DIAGNOSIS — K22.2 ESOPHAGEAL STRICTURE: ICD-10-CM

## 2020-08-17 LAB
ANION GAP SERPL CALC-SCNC: 12 MMOL/L (ref 8–16)
BASOPHILS # BLD AUTO: 0.05 K/UL (ref 0–0.2)
BASOPHILS NFR BLD: 0.5 % (ref 0–1.9)
BUN SERPL-MCNC: 13 MG/DL (ref 6–20)
CALCIUM SERPL-MCNC: 8.8 MG/DL (ref 8.7–10.5)
CHLORIDE SERPL-SCNC: 105 MMOL/L (ref 95–110)
CO2 SERPL-SCNC: 19 MMOL/L (ref 23–29)
CREAT SERPL-MCNC: 0.6 MG/DL (ref 0.5–1.4)
DIFFERENTIAL METHOD: NORMAL
EOSINOPHIL # BLD AUTO: 0.1 K/UL (ref 0–0.5)
EOSINOPHIL NFR BLD: 1.4 % (ref 0–8)
ERYTHROCYTE [DISTWIDTH] IN BLOOD BY AUTOMATED COUNT: 13.2 % (ref 11.5–14.5)
EST. GFR  (AFRICAN AMERICAN): >60 ML/MIN/1.73 M^2
EST. GFR  (NON AFRICAN AMERICAN): >60 ML/MIN/1.73 M^2
GLUCOSE SERPL-MCNC: 109 MG/DL (ref 70–110)
HCT VFR BLD AUTO: 40 % (ref 37–48.5)
HGB BLD-MCNC: 13.4 G/DL (ref 12–16)
IMM GRANULOCYTES # BLD AUTO: 0.03 K/UL (ref 0–0.04)
IMM GRANULOCYTES NFR BLD AUTO: 0.3 % (ref 0–0.5)
LYMPHOCYTES # BLD AUTO: 3.3 K/UL (ref 1–4.8)
LYMPHOCYTES NFR BLD: 31.7 % (ref 18–48)
MCH RBC QN AUTO: 30.4 PG (ref 27–31)
MCHC RBC AUTO-ENTMCNC: 33.5 G/DL (ref 32–36)
MCV RBC AUTO: 91 FL (ref 82–98)
MONOCYTES # BLD AUTO: 0.4 K/UL (ref 0.3–1)
MONOCYTES NFR BLD: 4.2 % (ref 4–15)
NEUTROPHILS # BLD AUTO: 6.4 K/UL (ref 1.8–7.7)
NEUTROPHILS NFR BLD: 61.9 % (ref 38–73)
NRBC BLD-RTO: 0 /100 WBC
PLATELET # BLD AUTO: 196 K/UL (ref 150–350)
PMV BLD AUTO: 9.7 FL (ref 9.2–12.9)
POTASSIUM SERPL-SCNC: 3.7 MMOL/L (ref 3.5–5.1)
RBC # BLD AUTO: 4.41 M/UL (ref 4–5.4)
SODIUM SERPL-SCNC: 136 MMOL/L (ref 136–145)
WBC # BLD AUTO: 10.26 K/UL (ref 3.9–12.7)

## 2020-08-17 PROCEDURE — 80048 BASIC METABOLIC PNL TOTAL CA: CPT

## 2020-08-17 PROCEDURE — 3077F SYST BP >= 140 MM HG: CPT | Mod: CPTII,S$GLB,, | Performed by: INTERNAL MEDICINE

## 2020-08-17 PROCEDURE — 3008F BODY MASS INDEX DOCD: CPT | Mod: CPTII,S$GLB,, | Performed by: INTERNAL MEDICINE

## 2020-08-17 PROCEDURE — 99214 PR OFFICE/OUTPT VISIT, EST, LEVL IV, 30-39 MIN: ICD-10-PCS | Mod: S$GLB,,, | Performed by: INTERNAL MEDICINE

## 2020-08-17 PROCEDURE — 3080F PR MOST RECENT DIASTOLIC BLOOD PRESSURE >= 90 MM HG: ICD-10-PCS | Mod: CPTII,S$GLB,, | Performed by: INTERNAL MEDICINE

## 2020-08-17 PROCEDURE — 99999 PR PBB SHADOW E&M-EST. PATIENT-LVL IV: ICD-10-PCS | Mod: PBBFAC,,, | Performed by: INTERNAL MEDICINE

## 2020-08-17 PROCEDURE — 3008F PR BODY MASS INDEX (BMI) DOCUMENTED: ICD-10-PCS | Mod: CPTII,S$GLB,, | Performed by: INTERNAL MEDICINE

## 2020-08-17 PROCEDURE — 3077F PR MOST RECENT SYSTOLIC BLOOD PRESSURE >= 140 MM HG: ICD-10-PCS | Mod: CPTII,S$GLB,, | Performed by: INTERNAL MEDICINE

## 2020-08-17 PROCEDURE — 99999 PR PBB SHADOW E&M-EST. PATIENT-LVL IV: CPT | Mod: PBBFAC,,, | Performed by: INTERNAL MEDICINE

## 2020-08-17 PROCEDURE — 36415 COLL VENOUS BLD VENIPUNCTURE: CPT

## 2020-08-17 PROCEDURE — 3080F DIAST BP >= 90 MM HG: CPT | Mod: CPTII,S$GLB,, | Performed by: INTERNAL MEDICINE

## 2020-08-17 PROCEDURE — 85025 COMPLETE CBC W/AUTO DIFF WBC: CPT

## 2020-08-17 PROCEDURE — 99214 OFFICE O/P EST MOD 30 MIN: CPT | Mod: S$GLB,,, | Performed by: INTERNAL MEDICINE

## 2020-08-17 RX ORDER — HYOSCYAMINE SULFATE 0.38 MG/1
0.38 TABLET, EXTENDED RELEASE ORAL 2 TIMES DAILY
Qty: 60 TABLET | Refills: 11 | Status: SHIPPED | OUTPATIENT
Start: 2020-08-17 | End: 2020-08-18

## 2020-08-17 NOTE — PROGRESS NOTES
Subjective:       Patient ID: Janina Klein is a 60 y.o. female.    Chief Complaint: Follow-up    Evaluation of the diarrhea is in progress.  At this point the thyroid and celiac disease panel also are negative.  She has had a cholecystectomy.  In January upper endoscopy revealed a hiatal hernia and gastroesophageal reflux.  Colonoscopy revealed diverticulosis.  Barium swallow shows esophageal narrowing with hiatal hernia.  She has had nausea without vomiting.  She has had dysphagia but denies jaundice or bleeding.  She denies acute obstruction or aspiration.  She denies cardiopulmonary symptoms.  She generally has daily bowel movements.  She continues her bowel program she states with additional fiber and has daily bowel movements without straining.  Her weight remains stable.  She denies fever chills.  She has a history of irritable she states that is.  She has a history ulcers she has had occasional vomiting but cannot pinpoint precipitating.  She takes her medications as prescribed.  She has occasional abdominal cramping.  Is mild in nature.  Sometimes is in the left lower quadrant and signals the urge to defecate.  In the past she states the levsin worked well.  That her insurance would not provide the medication and substitute to the dicyclomine which did not help her.  In the past the levbid has been helpful in the and she would like to start the medication again.      Allergies:  Review of patient's allergies indicates:   Allergen Reactions    Lisinopril        Medications:    Current Outpatient Medications:     albuterol 90 mcg/actuation inhaler, Inhale 2 puffs into the lungs every 6 (six) hours as needed for Wheezing., Disp: 18 g, Rfl: 11    albuterol-ipratropium 2.5mg-0.5mg/3mL (DUO-NEB) 0.5 mg-3 mg(2.5 mg base)/3 mL nebulizer solution, USE 1 VIAL VIA NEBULIZER FOUR TIMES DAILY, Disp: 360 mL, Rfl: 9    amLODIPine (NORVASC) 5 MG tablet, TAKE 1 TABLET EVERY DAY, Disp: 90 tablet, Rfl: 3    atorvastatin  (LIPITOR) 20 MG tablet, Take 1 tablet (20 mg total) by mouth once daily., Disp: 90 tablet, Rfl: 1    gabapentin (NEURONTIN) 800 MG tablet, TAKE 1 TABLET THREE TIMES DAILY, Disp: 270 tablet, Rfl: 1    metoprolol tartrate (LOPRESSOR) 50 MG tablet, TAKE 1 TABLET TWICE DAILY, Disp: 180 tablet, Rfl: 3    montelukast (SINGULAIR) 10 mg tablet, TAKE 1 TABLET BY MOUTH EVERY DAY, Disp: 90 tablet, Rfl: 1    ondansetron (ZOFRAN) 4 MG tablet, Take 1 tablet (4 mg total) by mouth every 6 (six) hours as needed., Disp: 30 tablet, Rfl: 0    pantoprazole (PROTONIX) 40 MG tablet, TAKE 1 TABLET EVERY DAY, Disp: 90 tablet, Rfl: 1    sucralfate (CARAFATE) 1 gram tablet, Take 1 tablet (1 g total) by mouth 4 (four) times daily., Disp: 120 tablet, Rfl: 11    tiotropium-olodaterol (STIOLTO RESPIMAT) 2.5-2.5 mcg/actuation Mist, Inhale 2 puffs into the lungs once daily. Controller, Disp: 12 g, Rfl: 3    hyoscyamine (LEVBID) 0.375 mg Tb12, Take 1 tablet (0.375 mg total) by mouth 2 (two) times a day., Disp: 60 tablet, Rfl: 11    Past Medical History:   Diagnosis Date    Anxiety     Arthritis     osteoarthritis    Chronic pain     Chronic pain     COPD (chronic obstructive pulmonary disease)     GERD (gastroesophageal reflux disease)     History of stomach ulcers     Hypertension     IBS (irritable bowel syndrome)     [facility] Diagnosed 2004    Palpitations        Past Surgical History:   Procedure Laterality Date    APPENDECTOMY       SECTION      COLONOSCOPY      ? [place] in [place]    COLONOSCOPY  2015    Dr. Farfan   diverticulosis    COLONOSCOPY N/A 2020    Procedure: COLONOSCOPY;  Surgeon: Casey Farfan MD;  Location: [facility] ENDO;  Service: General;  Laterality: N/A;    CYSTOSCOPY      ESOPHAGOGASTRODUODENOSCOPY N/A 2020    Procedure: ESOPHAGOGASTRODUODENOSCOPY (EGD);  Surgeon: Casey Farfan MD;  Location: [facility] ENDO;  Service: General;  Laterality: N/A;  WITH BXS     HERNIA REPAIR      HYSTERECTOMY  9/3/2014    with bso    LAPAROSCOPIC CHOLECYSTECTOMY N/A 5/26/2020    Procedure: CHOLECYSTECTOMY-LAPAROSCOPIC;  Surgeon: Casey Farfan MD;  Location: [facility] OR;  Service: General;  Laterality: N/A;    TOTAL ABDOMINAL HYSTERECTOMY W/ BILATERAL SALPINGOOPHORECTOMY Bilateral          Review of Systems   Constitutional: Negative for appetite change, fever and unexpected weight change.   HENT: Negative for trouble swallowing.         No jaundice.   Respiratory: Negative for cough, shortness of breath and wheezing.         She is a daily cigarette smoker.  She has been offered the smoking cessation past will consider this option she states.  She states she has history of lung disease.  She denies dysphagia aspiration hemoptysis.  She has occasional coughing significant sputum production she denies dyspnea on exertion.   Cardiovascular: Negative for chest pain.        She states her hyper tension hyperlipidemia well controlled.  She denies exertional chest pain or rhythm disturbance.   Gastrointestinal: Positive for nausea. Negative for abdominal distention, abdominal pain, anal bleeding, blood in stool, constipation and diarrhea.   Musculoskeletal: Positive for arthralgias and back pain. Negative for neck pain.   Skin: Negative for pallor and rash.   Neurological: Negative for dizziness, seizures, syncope, speech difficulty, weakness and numbness.   Hematological: Negative for adenopathy.   Psychiatric/Behavioral: Negative for confusion.       Objective:      Physical Exam  Vitals signs reviewed.   Constitutional:       Appearance: She is well-developed.      Comments: Well-nourished well-hydrated afebrile nonicteric white female.  She is sitting comfortably in the chair.  She is oriented x3.  She is normocephalic.  Pupils normal.  She can relate her history and answer questions appropriately.   HENT:      Head: Normocephalic.   Eyes:      Pupils: Pupils are equal, round, and reactive  to light.   Neck:      Musculoskeletal: Normal range of motion and neck supple.      Thyroid: No thyromegaly.      Trachea: No tracheal deviation.   Cardiovascular:      Rate and Rhythm: Normal rate and regular rhythm.      Heart sounds: Normal heart sounds.   Pulmonary:      Effort: Pulmonary effort is normal.      Breath sounds: Normal breath sounds.   Abdominal:      General: Bowel sounds are normal. There is no distension.      Palpations: Abdomen is soft. There is no mass.      Tenderness: There is no abdominal tenderness. There is no right CVA tenderness, left CVA tenderness, guarding or rebound.      Hernia: No hernia is present.      Comments: The abdomen is soft without tenderness masses or organomegaly.  Bowel sounds are normal.   Musculoskeletal: Normal range of motion.      Comments: She can ambulate normally.  She can go from the sitting the standing position without difficulty.   Lymphadenopathy:      Cervical: No cervical adenopathy.   Skin:     General: Skin is warm and dry.   Neurological:      Mental Status: She is alert and oriented to person, place, and time.      Cranial Nerves: No cranial nerve deficit.   Psychiatric:         Behavior: Behavior normal.           Plan:       Gastroesophageal reflux disease with esophagitis    Hiatal hernia    Diverticulosis of colon    History of laparoscopic cholecystectomy    Esophageal stricture    Dysphagia, unspecified type    Other orders  -     hyoscyamine (LEVBID) 0.375 mg Tb12; Take 1 tablet (0.375 mg total) by mouth 2 (two) times a day.  Dispense: 60 tablet; Refill: 11     She will continue her reflux regimen current medications.  She will chew her food well.  She will make a food diary and avoid the offending foods.  She will be scheduled for upper endoscopy and esophageal dilatation.  She has good health knowledge.  Specifically she realizes there is an extremely small incidence of bleeding perforation or aspiration.  She continues her bowel program  with additional fiber.  She started on the levbid.  She continues her other medications.

## 2020-08-17 NOTE — H&P (VIEW-ONLY)
Subjective:       Patient ID: Janina Klein is a 60 y.o. female.    Chief Complaint: Follow-up    Evaluation of the diarrhea is in progress.  At this point the thyroid and celiac disease panel also are negative.  She has had a cholecystectomy.  In January upper endoscopy revealed a hiatal hernia and gastroesophageal reflux.  Colonoscopy revealed diverticulosis.  Barium swallow shows esophageal narrowing with hiatal hernia.  She has had nausea without vomiting.  She has had dysphagia but denies jaundice or bleeding.  She denies acute obstruction or aspiration.  She denies cardiopulmonary symptoms.  She generally has daily bowel movements.  She continues her bowel program she states with additional fiber and has daily bowel movements without straining.  Her weight remains stable.  She denies fever chills.  She has a history of irritable she states that is.  She has a history ulcers she has had occasional vomiting but cannot pinpoint precipitating.  She takes her medications as prescribed.  She has occasional abdominal cramping.  Is mild in nature.  Sometimes is in the left lower quadrant and signals the urge to defecate.  In the past she states the levsin worked well.  That her insurance would not provide the medication and substitute to the dicyclomine which did not help her.  In the past the levbid has been helpful in the and she would like to start the medication again.      Allergies:  Review of patient's allergies indicates:   Allergen Reactions    Lisinopril        Medications:    Current Outpatient Medications:     albuterol 90 mcg/actuation inhaler, Inhale 2 puffs into the lungs every 6 (six) hours as needed for Wheezing., Disp: 18 g, Rfl: 11    albuterol-ipratropium 2.5mg-0.5mg/3mL (DUO-NEB) 0.5 mg-3 mg(2.5 mg base)/3 mL nebulizer solution, USE 1 VIAL VIA NEBULIZER FOUR TIMES DAILY, Disp: 360 mL, Rfl: 9    amLODIPine (NORVASC) 5 MG tablet, TAKE 1 TABLET EVERY DAY, Disp: 90 tablet, Rfl: 3    atorvastatin  (LIPITOR) 20 MG tablet, Take 1 tablet (20 mg total) by mouth once daily., Disp: 90 tablet, Rfl: 1    gabapentin (NEURONTIN) 800 MG tablet, TAKE 1 TABLET THREE TIMES DAILY, Disp: 270 tablet, Rfl: 1    metoprolol tartrate (LOPRESSOR) 50 MG tablet, TAKE 1 TABLET TWICE DAILY, Disp: 180 tablet, Rfl: 3    montelukast (SINGULAIR) 10 mg tablet, TAKE 1 TABLET BY MOUTH EVERY DAY, Disp: 90 tablet, Rfl: 1    ondansetron (ZOFRAN) 4 MG tablet, Take 1 tablet (4 mg total) by mouth every 6 (six) hours as needed., Disp: 30 tablet, Rfl: 0    pantoprazole (PROTONIX) 40 MG tablet, TAKE 1 TABLET EVERY DAY, Disp: 90 tablet, Rfl: 1    sucralfate (CARAFATE) 1 gram tablet, Take 1 tablet (1 g total) by mouth 4 (four) times daily., Disp: 120 tablet, Rfl: 11    tiotropium-olodaterol (STIOLTO RESPIMAT) 2.5-2.5 mcg/actuation Mist, Inhale 2 puffs into the lungs once daily. Controller, Disp: 12 g, Rfl: 3    hyoscyamine (LEVBID) 0.375 mg Tb12, Take 1 tablet (0.375 mg total) by mouth 2 (two) times a day., Disp: 60 tablet, Rfl: 11    Past Medical History:   Diagnosis Date    Anxiety     Arthritis     osteoarthritis    Chronic pain     Chronic pain     COPD (chronic obstructive pulmonary disease)     GERD (gastroesophageal reflux disease)     History of stomach ulcers     Hypertension     IBS (irritable bowel syndrome)     [facility] Diagnosed 2004    Palpitations        Past Surgical History:   Procedure Laterality Date    APPENDECTOMY       SECTION      [place]OSCOPY      ? [place] in [place]    COLONOSCOPY  2015    Dr. Farfan   diverticulosis    COLONOSCOPY N/A 2020    Procedure: COLONOSCOPY;  Surgeon: Casey Farfan MD;  Location: [facility] ENDO;  Service: General;  Laterality: N/A;    CYSTOSCOPY      ESOPHAGOGASTRODUODENOSCOPY N/A 2020    Procedure: ESOPHAGOGASTRODUODENOSCOPY (EGD);  Surgeon: Casey Farfan MD;  Location: [facility] ENDO;  Service: General;  Laterality: N/A;  WITH BXS     HERNIA REPAIR      HYSTERECTOMY  9/3/2014    with bso    LAPAROSCOPIC CHOLECYSTECTOMY N/A 5/26/2020    Procedure: CHOLECYSTECTOMY-LAPAROSCOPIC;  Surgeon: Casey Farfan MD;  Location: [facility] OR;  Service: General;  Laterality: N/A;    TOTAL ABDOMINAL HYSTERECTOMY W/ BILATERAL SALPINGOOPHORECTOMY Bilateral          Review of Systems   Constitutional: Negative for appetite change, fever and unexpected weight change.   HENT: Negative for trouble swallowing.         No jaundice.   Respiratory: Negative for cough, shortness of breath and wheezing.         She is a daily cigarette smoker.  She has been offered the smoking cessation past will consider this option she states.  She states she has history of lung disease.  She denies dysphagia aspiration hemoptysis.  She has occasional coughing significant sputum production she denies dyspnea on exertion.   Cardiovascular: Negative for chest pain.        She states her hyper tension hyperlipidemia well controlled.  She denies exertional chest pain or rhythm disturbance.   Gastrointestinal: Positive for nausea. Negative for abdominal distention, abdominal pain, anal bleeding, blood in stool, constipation and diarrhea.   Musculoskeletal: Positive for arthralgias and back pain. Negative for neck pain.   Skin: Negative for pallor and rash.   Neurological: Negative for dizziness, seizures, syncope, speech difficulty, weakness and numbness.   Hematological: Negative for adenopathy.   Psychiatric/Behavioral: Negative for confusion.       Objective:      Physical Exam  Vitals signs reviewed.   Constitutional:       Appearance: She is well-developed.      Comments: Well-nourished well-hydrated afebrile nonicteric white female.  She is sitting comfortably in the chair.  She is oriented x3.  She is normocephalic.  Pupils normal.  She can relate her history and answer questions appropriately.   HENT:      Head: Normocephalic.   Eyes:      Pupils: Pupils are equal, round, and reactive  to light.   Neck:      Musculoskeletal: Normal range of motion and neck supple.      Thyroid: No thyromegaly.      Trachea: No tracheal deviation.   Cardiovascular:      Rate and Rhythm: Normal rate and regular rhythm.      Heart sounds: Normal heart sounds.   Pulmonary:      Effort: Pulmonary effort is normal.      Breath sounds: Normal breath sounds.   Abdominal:      General: Bowel sounds are normal. There is no distension.      Palpations: Abdomen is soft. There is no mass.      Tenderness: There is no abdominal tenderness. There is no right CVA tenderness, left CVA tenderness, guarding or rebound.      Hernia: No hernia is present.      Comments: The abdomen is soft without tenderness masses or organomegaly.  Bowel sounds are normal.   Musculoskeletal: Normal range of motion.      Comments: She can ambulate normally.  She can go from the sitting the standing position without difficulty.   Lymphadenopathy:      Cervical: No cervical adenopathy.   Skin:     General: Skin is warm and dry.   Neurological:      Mental Status: She is alert and oriented to person, place, and time.      Cranial Nerves: No cranial nerve deficit.   Psychiatric:         Behavior: Behavior normal.           Plan:       Gastroesophageal reflux disease with esophagitis    Hiatal hernia    Diverticulosis of colon    History of laparoscopic cholecystectomy    Esophageal stricture    Dysphagia, unspecified type    Other orders  -     hyoscyamine (LEVBID) 0.375 mg Tb12; Take 1 tablet (0.375 mg total) by mouth 2 (two) times a day.  Dispense: 60 tablet; Refill: 11     She will continue her reflux regimen current medications.  She will chew her food well.  She will make a food diary and avoid the offending foods.  She will be scheduled for upper endoscopy and esophageal dilatation.  She has good health knowledge.  Specifically she realizes there is an extremely small incidence of bleeding perforation or aspiration.  She continues her bowel program  with additional fiber.  She started on the levbid.  She continues her other medications.

## 2020-08-17 NOTE — LETTER
August 18, 2020      Lisa Y. Cooksey, AYESHA  [address]  [place]  [place] [address]           [facility]  [address]  [place] [address]  Phone: [phone]  Fax: [phone]          Patient: Janina Klein   MR Number: [id number]   YOB: 1960   Date of Visit: 8/17/2020       Dear Lisa Y. Cooksey:    Thank you for referring Janina Klein to me for evaluation. Attached you will find relevant portions of my assessment and plan of care.    If you have questions, please do not hesitate to call me. I look forward to following Janina Klein along with you.    Sincerely,    Mike Tong MD    Enclosure  CC:  No Recipients    If you would like to receive this communication electronically, please contact [email or web address] or [phone] to request more information on [employer] access.    For providers and/or their staff who would like to refer a patient to [facility], please contact us through our one-stop-shop provider referral line, [facility], at [phone].    If you feel you have received this communication in error or would no longer like to receive these types of communications, please e-mail [email or web address]

## 2020-08-18 PROBLEM — R13.10 DYSPHAGIA: Status: ACTIVE | Noted: 2020-08-18

## 2020-08-18 PROBLEM — K22.2 ESOPHAGEAL STRICTURE: Status: ACTIVE | Noted: 2020-08-18

## 2020-08-18 RX ORDER — DICYCLOMINE HYDROCHLORIDE 10 MG/1
10 CAPSULE ORAL
Qty: 120 CAPSULE | Refills: 11 | Status: SHIPPED | OUTPATIENT
Start: 2020-08-18 | End: 2021-08-18

## 2020-08-18 NOTE — TELEPHONE ENCOUNTER
Please Advise----- Message from Danae Belle sent at 8/18/2020  9:04 AM CDT -----  Regarding: dicyclomine (BENTYL) 10 MG capsule 120 capsule /other script sent is too expensive/not covered  Contact: Janina pt  Type:  RX Refill Request    Who Called:  Janina  Refill or New Rx:  refill  RX Name and Strength:  dicyclomine (BENTYL) 10 MG capsule 120 capsule   How is the patient currently taking it? (ex. 1XDay):  n/a  Is this a 30 day or 90 day RX:  30  Preferred Pharmacy with phone number:    [id number] [place] - [address]  [address]  [place] [address]  Phone: [phone] Fax: [phone]    Local or Mail Order:  local  Ordering Provider:  Ran Epps Call Back Number:  [phone]  Additional Information:  Pls call pt regarding having this script sent instead of the hyoscyamine (LEVBID) 0.375 mg Tb12/that one is not covered

## 2020-08-19 ENCOUNTER — ANESTHESIA EVENT (OUTPATIENT)
Dept: SURGERY | Facility: HOSPITAL | Age: 60
End: 2020-08-19
Payer: MEDICARE

## 2020-08-19 ENCOUNTER — ANESTHESIA (OUTPATIENT)
Dept: SURGERY | Facility: HOSPITAL | Age: 60
End: 2020-08-19
Payer: MEDICARE

## 2020-08-19 ENCOUNTER — HOSPITAL ENCOUNTER (OUTPATIENT)
Facility: HOSPITAL | Age: 60
Discharge: HOME | End: 2020-08-19
Attending: INTERNAL MEDICINE | Admitting: INTERNAL MEDICINE
Payer: MEDICARE

## 2020-08-19 VITALS
HEIGHT: 64 IN | SYSTOLIC BLOOD PRESSURE: 153 MMHG | RESPIRATION RATE: 16 BRPM | HEART RATE: 60 BPM | WEIGHT: 140 LBS | TEMPERATURE: 98 F | BODY MASS INDEX: 23.9 KG/M2 | OXYGEN SATURATION: 99 % | DIASTOLIC BLOOD PRESSURE: 102 MMHG

## 2020-08-19 DIAGNOSIS — K22.2 ESOPHAGEAL STRICTURE: ICD-10-CM

## 2020-08-19 PROCEDURE — 43248 EGD GUIDE WIRE INSERTION: CPT | Mod: ,,, | Performed by: INTERNAL MEDICINE

## 2020-08-19 PROCEDURE — 88342 IMHCHEM/IMCYTCHM 1ST ANTB: CPT | Mod: 26,,, | Performed by: PATHOLOGY

## 2020-08-19 PROCEDURE — 63600175 PHARM REV CODE 636 W HCPCS: Performed by: INTERNAL MEDICINE

## 2020-08-19 PROCEDURE — 88305 TISSUE EXAM BY PATHOLOGIST: CPT | Performed by: PATHOLOGY

## 2020-08-19 PROCEDURE — 27201423 OPTIME MED/SURG SUP & DEVICES STERILE SUPPLY: Performed by: INTERNAL MEDICINE

## 2020-08-19 PROCEDURE — D9220A PRA ANESTHESIA: Mod: ANES,,, | Performed by: ANESTHESIOLOGY

## 2020-08-19 PROCEDURE — 37000008 HC ANESTHESIA 1ST 15 MINUTES: Performed by: INTERNAL MEDICINE

## 2020-08-19 PROCEDURE — C1769 GUIDE WIRE: HCPCS | Performed by: INTERNAL MEDICINE

## 2020-08-19 PROCEDURE — 63600175 PHARM REV CODE 636 W HCPCS: Performed by: NURSE ANESTHETIST, CERTIFIED REGISTERED

## 2020-08-19 PROCEDURE — 88342 CHG IMMUNOCYTOCHEMISTRY: ICD-10-PCS | Mod: 26,,, | Performed by: PATHOLOGY

## 2020-08-19 PROCEDURE — 37000009 HC ANESTHESIA EA ADD 15 MINS: Performed by: INTERNAL MEDICINE

## 2020-08-19 PROCEDURE — D9220A PRA ANESTHESIA: ICD-10-PCS | Mod: ANES,,, | Performed by: ANESTHESIOLOGY

## 2020-08-19 PROCEDURE — 88305 TISSUE EXAM BY PATHOLOGIST: CPT | Mod: 26,,, | Performed by: PATHOLOGY

## 2020-08-19 PROCEDURE — 43248 PR EGD, FLEX, W/DILATION OVER GUIDEWIRE: ICD-10-PCS | Mod: ,,, | Performed by: INTERNAL MEDICINE

## 2020-08-19 PROCEDURE — 43239 EGD BIOPSY SINGLE/MULTIPLE: CPT | Performed by: INTERNAL MEDICINE

## 2020-08-19 PROCEDURE — 25000003 PHARM REV CODE 250: Performed by: NURSE ANESTHETIST, CERTIFIED REGISTERED

## 2020-08-19 PROCEDURE — 88342 IMHCHEM/IMCYTCHM 1ST ANTB: CPT | Performed by: PATHOLOGY

## 2020-08-19 PROCEDURE — 43248 EGD GUIDE WIRE INSERTION: CPT | Performed by: INTERNAL MEDICINE

## 2020-08-19 PROCEDURE — D9220A PRA ANESTHESIA: ICD-10-PCS | Mod: CRNA,,, | Performed by: NURSE ANESTHETIST, CERTIFIED REGISTERED

## 2020-08-19 PROCEDURE — 88305 TISSUE EXAM BY PATHOLOGIST: ICD-10-PCS | Mod: 26,,, | Performed by: PATHOLOGY

## 2020-08-19 PROCEDURE — D9220A PRA ANESTHESIA: Mod: CRNA,,, | Performed by: NURSE ANESTHETIST, CERTIFIED REGISTERED

## 2020-08-19 RX ORDER — GLYCOPYRROLATE 0.2 MG/ML
INJECTION INTRAMUSCULAR; INTRAVENOUS
Status: DISCONTINUED | OUTPATIENT
Start: 2020-08-19 | End: 2020-08-19

## 2020-08-19 RX ORDER — LIDOCAINE HYDROCHLORIDE 10 MG/ML
1 INJECTION, SOLUTION EPIDURAL; INFILTRATION; INTRACAUDAL; PERINEURAL ONCE
Status: CANCELLED | OUTPATIENT
Start: 2020-08-19 | End: 2020-08-19

## 2020-08-19 RX ORDER — PROPOFOL 10 MG/ML
VIAL (ML) INTRAVENOUS
Status: DISCONTINUED | OUTPATIENT
Start: 2020-08-19 | End: 2020-08-19

## 2020-08-19 RX ORDER — ONDANSETRON 2 MG/ML
4 INJECTION INTRAMUSCULAR; INTRAVENOUS DAILY PRN
Status: DISCONTINUED | OUTPATIENT
Start: 2020-08-19 | End: 2020-08-19 | Stop reason: HOSPADM

## 2020-08-19 RX ORDER — LIDOCAINE HYDROCHLORIDE 20 MG/ML
INJECTION, SOLUTION EPIDURAL; INFILTRATION; INTRACAUDAL; PERINEURAL
Status: DISCONTINUED | OUTPATIENT
Start: 2020-08-19 | End: 2020-08-19

## 2020-08-19 RX ORDER — SODIUM CHLORIDE, SODIUM LACTATE, POTASSIUM CHLORIDE, CALCIUM CHLORIDE 600; 310; 30; 20 MG/100ML; MG/100ML; MG/100ML; MG/100ML
INJECTION, SOLUTION INTRAVENOUS CONTINUOUS
Status: DISCONTINUED | OUTPATIENT
Start: 2020-08-19 | End: 2020-08-19 | Stop reason: HOSPADM

## 2020-08-19 RX ORDER — SODIUM CHLORIDE, SODIUM LACTATE, POTASSIUM CHLORIDE, CALCIUM CHLORIDE 600; 310; 30; 20 MG/100ML; MG/100ML; MG/100ML; MG/100ML
INJECTION, SOLUTION INTRAVENOUS CONTINUOUS
Status: CANCELLED | OUTPATIENT
Start: 2020-08-19

## 2020-08-19 RX ORDER — SODIUM CHLORIDE, SODIUM LACTATE, POTASSIUM CHLORIDE, CALCIUM CHLORIDE 600; 310; 30; 20 MG/100ML; MG/100ML; MG/100ML; MG/100ML
125 INJECTION, SOLUTION INTRAVENOUS CONTINUOUS
Status: DISCONTINUED | OUTPATIENT
Start: 2020-08-19 | End: 2020-08-19 | Stop reason: HOSPADM

## 2020-08-19 RX ADMIN — GLYCOPYRROLATE 0.2 MG: 0.2 INJECTION INTRAMUSCULAR; INTRAVENOUS at 10:08

## 2020-08-19 RX ADMIN — SODIUM CHLORIDE, POTASSIUM CHLORIDE, SODIUM LACTATE AND CALCIUM CHLORIDE: 600; 310; 30; 20 INJECTION, SOLUTION INTRAVENOUS at 10:08

## 2020-08-19 RX ADMIN — PROPOFOL 50 MG: 10 INJECTION, EMULSION INTRAVENOUS at 10:08

## 2020-08-19 RX ADMIN — SODIUM CHLORIDE, POTASSIUM CHLORIDE, SODIUM LACTATE AND CALCIUM CHLORIDE: 600; 310; 30; 20 INJECTION, SOLUTION INTRAVENOUS at 08:08

## 2020-08-19 RX ADMIN — LIDOCAINE HYDROCHLORIDE 100 MG: 20 INJECTION, SOLUTION EPIDURAL; INFILTRATION; INTRACAUDAL; PERINEURAL at 10:08

## 2020-08-19 NOTE — ANESTHESIA POSTPROCEDURE EVALUATION
Anesthesia Post Evaluation    Patient: Janina Klein    Procedure(s) Performed: Procedure(s) (LRB):  EGD W/ DILAT (ESOPHAGOGASTRODUODENOSCOPY WITH DILATION) (N/A)    [facility] Anesthesia Post Op Evaluation      Vitals Value Taken Time   /102 08/19/20 1052   Temp 98 08/19/20 1111   Pulse 60 08/19/20 1102   Resp 16 08/19/20 1102   SpO2 99 % 08/19/20 1050   Vitals shown include unvalidated device data.      Event Time   Out of Recovery 10:45:00         Pain/Kit Score: Kit Score: 10 (8/19/2020 10:45 AM)  Modified Kit Score: 19 (8/19/2020 11:00 AM)

## 2020-08-19 NOTE — DISCHARGE SUMMARY
Upper endoscopy revealed esophageal stricture and she was dilated.  She had a hiatal hernia and diffuse gastritis.  There was a channel ulcer.  Biopsies were obtained for H pylori.  Patient will plan to elevate the head of the bed.  She will chew food well.  She will make a food diary avoid the offending foods continued and he continue her current medications vitamins and minerals.  She continues her bowel program with additional fiber to produced daily bowel movements.  She has a followup upon the office.

## 2020-08-19 NOTE — TRANSFER OF CARE
"Anesthesia Transfer of Care Note    Patient: Janina Klein    Procedure(s) Performed: Procedure(s) (LRB):  EGD W/ DILAT (ESOPHAGOGASTRODUODENOSCOPY WITH DILATION) (N/A)    Patient location: PACU    Anesthesia Type: general    Transport from OR: Transported from OR on room air with adequate spontaneous ventilation    Post pain: adequate analgesia    Post assessment: no apparent anesthetic complications    Post vital signs: stable    Level of consciousness: awake, alert and oriented    Nausea/Vomiting: no nausea/vomiting    Complications: none    Transfer of care protocol was followed      Last vitals:   Visit Vitals  BP (!) 150/98 (BP Location: Right arm, Patient Position: Lying)   Pulse 60   Temp 36.6 °C (97.8 °F) (Oral)   Resp 18   Ht 5' 4" (1.626 m)   Wt 63.5 kg (140 lb)   LMP  (Approximate)   SpO2 98%   Breastfeeding No   BMI 24.03 kg/m²     "

## 2020-08-19 NOTE — ADDENDUM NOTE
Addendum  created 08/19/20 1234 by Ralf Ambriz, CRNA    Flowsheet accepted, Intraprocedure Event edited, Intraprocedure Flowsheets edited, Intraprocedure Meds edited, Orders acknowledged in Narrator

## 2020-08-19 NOTE — ANESTHESIA PREPROCEDURE EVALUATION
08/19/2020  Janina Klein is a 60 y.o., female.    Anesthesia Evaluation    I have reviewed the Patient Summary Reports.    I have reviewed the Nursing Notes.    I have reviewed the Medications.     Review of Systems  Anesthesia Hx:  No problems with previous Anesthesia  Neg history of prior surgery. Denies Family Hx of Anesthesia complications.   Denies Personal Hx of Anesthesia complications.   Social:  Smoker    Hematology/Oncology:  Hematology Normal   Oncology Normal     EENT/Dental:EENT/Dental Normal   Cardiovascular:   Hypertension, well controlled    Pulmonary:   COPD, mild    Renal/:  Renal/ Normal     Hepatic/GI:   Hiatal Hernia, GERD, poorly controlled    Musculoskeletal:  Musculoskeletal Normal    Neurological:   Neuromuscular Disease,    Endocrine:  Endocrine Normal    Dermatological:  Skin Normal    Psych:  Psychiatric Normal              Anesthesia Plan  Type of Anesthesia, risks & benefits discussed:  Anesthesia Type:  general  Patient's Preference:   Intra-op Monitoring Plan: standard ASA monitors  Intra-op Monitoring Plan Comments:   Post Op Pain Control Plan:   Post Op Pain Control Plan Comments:   Induction:   IV  Beta Blocker:  Patient is not currently on a Beta-Blocker (No further documentation required).       Informed Consent: Patient understands risks and agrees with Anesthesia plan.  Questions answered. Anesthesia consent signed with patient.  ASA Score: 2     Day of Surgery Review of History & Physical: I have interviewed and examined the patient. I have reviewed the patient's H&P dated:    H&P update referred to the provider.         Ready For Surgery From Anesthesia Perspective.

## 2020-08-19 NOTE — PLAN OF CARE
Personal items returned. Changing clothing at bedside. Denies need for assistance and need to void. Mother remains at pts side.

## 2020-08-19 NOTE — PLAN OF CARE
Has met unit/department guidelines for discharge from each phase of the post procedure continuum. Leaving floor per w/ with RN and mother. AAO x3. Resp even and unlabored room air. No distress noted. All personal belongings returned to pt.

## 2020-08-19 NOTE — PROVATION PATIENT INSTRUCTIONS
Discharge Summary/Instructions after an Endoscopic Procedure  Patient Name: Janina Klein  Patient MRN: [id number]  Patient YOB: 1960 Wednesday, August 19, 2020  Mike Tong MD  RESTRICTIONS:  During your procedure today, you received medications for sedation.  These   medications may affect your judgment, balance and coordination.  Therefore,   for 24 hours, you have the following restrictions:   - DO NOT drive a car, operate machinery, make legal/financial decisions,   sign important papers or drink alcohol.    ACTIVITY:  Today: no heavy lifting, straining or running due to procedural   sedation/anesthesia.  The following day: return to full activity including work.  DIET:  Eat and drink normally unless instructed otherwise.     TREATMENT FOR COMMON SIDE EFFECTS:  - Mild abdominal pain, nausea, belching, bloating or excessive gas:  rest,   eat lightly and use a heating pad.  - Sore Throat: treat with throat lozenges and/or gargle with warm salt   water.  - Because air was used during the procedure, expelling large amounts of air   from your rectum or belching is normal.  - If a bowel prep was taken, you may not have a bowel movement for 1-3 days.    This is normal.  SYMPTOMS TO WATCH FOR AND REPORT TO YOUR PHYSICIAN:  1. Abdominal pain or bloating, other than gas cramps.  2. Chest pain.  3. Back pain.  4. Signs of infection such as: chills or fever occurring within 24 hours   after the procedure.  5. Rectal bleeding, which would show as bright red, maroon, or black stools.   (A tablespoon of blood from the rectum is not serious, especially if   hemorrhoids are present.)  6. Vomiting.  7. Weakness or dizziness.  GO DIRECTLY TO THE NEAREST EMERGENCY ROOM IF YOU HAVE ANY OF THE FOLLOWING:      Difficulty breathing              Chills and/or fever over 101 F   Persistent vomiting and/or vomiting blood   Severe abdominal pain   Severe chest pain   Black, tarry stools   Bleeding- more than one tablespoon   Any  other symptom or condition that you feel may need urgent attention  Your doctor recommends these additional instructions:  If any biopsies were taken, your doctors clinic will contact you in 1 to 2   weeks with any results.  - Discharge patient to home (ambulatory).   - Resume previous diet.  For questions, problems or results please call your physician - Mike Tong MD at Work:  [phone].  [facility] EMERGENCY ROOM PHONE NUMBER: [phone]  IF A COMPLICATION OR EMERGENCY SITUATION ARISES AND YOU ARE UNABLE TO REACH   YOUR PHYSICIAN - GO DIRECTLY TO THE EMERGENCY ROOM.  MD Mike Bello MD  8/19/2020 10:30:05 AM  This report has been verified and signed electronically.  [employer]

## 2020-08-19 NOTE — H&P
"   Office Visit    8/17/2020   [facility] - Gastro     Mike Tong MD  Gastroenterology Gastroesophageal reflux disease with esophagitis +5 more  Dx Follow-up ; Referred by Lisa Y. Cooksey, NP  Reason for Visit                       Additional Documentation  Vitals:  /95(BP Location: Left arm, Patient Position: Sitting, BP Method: Medium (Automatic))   Pulse 68   Temp 97.5 °F (36.4 °C) (Temporal)   Resp 18   Ht 5' 4" (1.626 m)   Wt 64 kg (141 lb)   SpO2 98%   BMI 24.20 kg/m²   BSA 1.7 m²   Flowsheets:  Anthropometrics      SmartForms:  [facility] AMB - FALL RISK      Encounter Info:  Billing Info,   History,   Allergies,   Detailed Report           Instructions    Follow up in about 1 month (around 9/17/2020).  She will be scheduled for upper endoscopy and esophageal dilatation. The barium swallow shows an esophageal stricture and hiatal hernia. She will complete her evaluation for the diarrhea. The thyroid function and celiac disease panel were negative. She will make a food diary and avoid the offending foods. She continues her current medications and also her reflux regimen vitamins and minerals.             After Visit Summary (Printed 8/17/2020)     Progress Notes  Mike Tong MD (Physician)  Gastroenterology  8/17/2020 9:00 AM  Signed   Subjective:   Patient ID: Janina Klein is a 60 y.o. female.   Chief Complaint: Follow-up   Evaluation of the diarrhea is in progress. At this point the thyroid and celiac disease panel also are negative. She has had a cholecystectomy. In January upper endoscopy revealed a hiatal hernia and gastroesophageal reflux. Colonoscopy revealed diverticulosis. Barium swallow shows esophageal narrowing with hiatal hernia. She has had nausea without vomiting. She has had dysphagia but denies jaundice or bleeding. She denies acute obstruction or aspiration. She denies cardiopulmonary symptoms. She generally has daily bowel movements. She continues her bowel " program she states with additional fiber and has daily bowel movements without straining. Her weight remains stable. She denies fever chills. She has a history of irritable she states that is. She has a history ulcers she has had occasional vomiting but cannot pinpoint precipitating. She takes her medications as prescribed. She has occasional abdominal cramping. Is mild in nature. Sometimes is in the left lower quadrant and signals the urge to defecate. In the past she states the levsin worked well. That her insurance would not provide the medication and substitute to the dicyclomine which did not help her. In the past the levbid has been helpful in the and she would like to start the medication again.     Allergies:           Review of patient's allergies indicates:    Allergen Reactions     Lisinopril     Medications:   Current Outpatient Medications:    albuterol 90 mcg/actuation inhaler, Inhale 2 puffs into the lungs every 6 (six) hours as needed for Wheezing., Disp: 18 g, Rfl: 11    albuterol-ipratropium 2.5mg-0.5mg/3mL (DUO-NEB) 0.5 mg-3 mg(2.5 mg base)/3 mL nebulizer solution, USE 1 VIAL VIA NEBULIZER FOUR TIMES DAILY, Disp: 360 mL, Rfl: 9    amLODIPine (NORVASC) 5 MG tablet, TAKE 1 TABLET EVERY DAY, Disp: 90 tablet, Rfl: 3    atorvastatin (LIPITOR) 20 MG tablet, Take 1 tablet (20 mg total) by mouth once daily., Disp: 90 tablet, Rfl: 1    gabapentin (NEURONTIN) 800 MG tablet, TAKE 1 TABLET THREE TIMES DAILY, Disp: 270 tablet, Rfl: 1    metoprolol tartrate (LOPRESSOR) 50 MG tablet, TAKE 1 TABLET TWICE DAILY, Disp: 180 tablet, Rfl: 3    montelukast (SINGULAIR) 10 mg tablet, TAKE 1 TABLET BY MOUTH EVERY DAY, Disp: 90 tablet, Rfl: 1    ondansetron (ZOFRAN) 4 MG tablet, Take 1 tablet (4 mg total) by mouth every 6 (six) hours as needed., Disp: 30 tablet, Rfl: 0    pantoprazole (PROTONIX) 40 MG tablet, TAKE 1 TABLET EVERY DAY, Disp: 90 tablet, Rfl: 1    sucralfate (CARAFATE) 1 gram tablet, Take 1 tablet  (1 g total) by mouth 4 (four) times daily., Disp: 120 tablet, Rfl: 11    tiotropium-olodaterol (STIOLTO RESPIMAT) 2.5-2.5 mcg/actuation Mist, Inhale 2 puffs into the lungs once daily. Controller, Disp: 12 g, Rfl: 3    hyoscyamine (LEVBID) 0.375 mg Tb12, Take 1 tablet (0.375 mg total) by mouth 2 (two) times a day., Disp: 60 tablet, Rfl: 11           Past Medical History:    Diagnosis Date     Anxiety      Arthritis      osteoarthritis     Chronic pain      Chronic pain      COPD (chronic obstructive pulmonary disease)      GERD (gastroesophageal reflux disease)      History of stomach ulcers      Hypertension      IBS (irritable bowel syndrome)      [facility] Diagnosed 2004     Palpitations            Past Surgical History:    Procedure Laterality Date     APPENDECTOMY        SECTION       [place]COPY       ? [place] in [place]     COLONOSCOPY  2015     Dr. Farfan diverticulosis     COLONOSCOPY N/A 2020     Procedure: COLONOSCOPY; Surgeon: Casey Farfan MD; Location: [facility] ENDO; Service: General; Laterality: N/A;     CYSTOSCOPY       ESOPHAGOGASTRODUODENOSCOPY N/A 2020     Procedure: ESOPHAGOGASTRODUODENOSCOPY (EGD); Surgeon: Casey Farfan MD; Location: [facility] ENDO; Service: General; Laterality: N/A; WITH BXS     HERNIA REPAIR       HYSTERECTOMY  9/3/2014     with bso     LAPAROSCOPIC CHOLECYSTECTOMY N/A 2020     Procedure: CHOLECYSTECTOMY-LAPAROSCOPIC; Surgeon: Caesy Farfan MD; Location: [facility] OR; Service: General; Laterality: N/A;     TOTAL ABDOMINAL HYSTERECTOMY W/ BILATERAL SALPINGOOPHORECTOMY Bilateral     Review of Systems   Constitutional: Negative for appetite change, fever and unexpected weight change.   HENT: Negative for trouble swallowing.   No jaundice.   Respiratory: Negative for cough, shortness of breath and wheezing.   She is a daily cigarette smoker. She has been offered the smoking cessation past will consider this option she  states. She states she has history of lung disease. She denies dysphagia aspiration hemoptysis. She has occasional coughing significant sputum production she denies dyspnea on exertion.   Cardiovascular: Negative for chest pain.   She states her hyper tension hyperlipidemia well controlled. She denies exertional chest pain or rhythm disturbance.   Gastrointestinal: Positive for nausea. Negative for abdominal distention, abdominal pain, anal bleeding, blood in stool, constipation and diarrhea.   Musculoskeletal: Positive for arthralgias and back pain. Negative for neck pain.   Skin: Negative for pallor and rash.   Neurological: Negative for dizziness, seizures, syncope, speech difficulty, weakness and numbness.   Hematological: Negative for adenopathy.   Psychiatric/Behavioral: Negative for confusion.        Objective:     Physical Exam   Vitals signs reviewed.   Constitutional:   Appearance: She is well-developed.   Comments: Well-nourished well-hydrated afebrile nonicteric white female. She is sitting comfortably in the chair. She is oriented x3. She is normocephalic. Pupils normal. She can relate her history and answer questions appropriately.   HENT:   Head: Normocephalic.   Eyes:   Pupils: Pupils are equal, round, and reactive to light.   Neck:   Musculoskeletal: Normal range of motion and neck supple.   Thyroid: No thyromegaly.   Trachea: No tracheal deviation.   Cardiovascular:   Rate and Rhythm: Normal rate and regular rhythm.   Heart sounds: Normal heart sounds.   Pulmonary:   Effort: Pulmonary effort is normal.   Breath sounds: Normal breath sounds.   Abdominal:   General: Bowel sounds are normal. There is no distension.   Palpations: Abdomen is soft. There is no mass.   Tenderness: There is no abdominal tenderness. There is no right CVA tenderness, left CVA tenderness, guarding or rebound.   Hernia: No hernia is present.   Comments: The abdomen is soft without tenderness masses or organomegaly. Bowel  sounds are normal.   Musculoskeletal: Normal range of motion.   Comments: She can ambulate normally. She can go from the sitting the standing position without difficulty.   Lymphadenopathy:   Cervical: No cervical adenopathy.   Skin:   General: Skin is warm and dry.   Neurological:   Mental Status: She is alert and oriented to person, place, and time.   Cranial Nerves: No cranial nerve deficit.   Psychiatric:   Behavior: Behavior normal.        Plan:     Gastroesophageal reflux disease with esophagitis   Hiatal hernia   Diverticulosis of colon   History of laparoscopic cholecystectomy   Esophageal stricture   Dysphagia, unspecified type   Other orders   - hyoscyamine (LEVBID) 0.375 mg Tb12; Take 1 tablet (0.375 mg total) by mouth 2 (two) times a day. Dispense: 60 tablet; Refill: 11   She will continue her reflux regimen current medications. She will chew her food well. She will make a food diary and avoid the offending foods. She will be scheduled for upper endoscopy and esophageal dilatation. She has good health knowledge. Specifically she realizes there is an extremely small incidence of bleeding perforation or aspiration. She continues her bowel program with additional fiber. She started on the levbid. She continues her other medications.      Other Notes  All notes               Communications    Letter sent to Lisa Y. Cooksey, NP   AMB Visit Summary: Provider Version   Sent 8/18/2020          Active Diagnosis Review (HCC)  Active Diagnosis Review (HCC)                                   Not recorded                                 All Charges for This Encounter  Code Description Service Date Service Provider Modifiers Qty   36851 DC OFFICE/OUTPT VISIT,EST,LEVL IV 8/17/2020 Mike Tong MD S$GLB 1   252645643 DC PBB SHADOW E&M-EST. PATIENT-LVL IV 8/17/2020 Mike Tong MD PBBFAC 1   3080F DC MOST RECENT DIASTOLIC BLOOD PRESSURE >= 90 MM HG 8/17/2020 Mike Tong MD S$GLB, CPTII 1   3077F DC MOST RECENT  SYSTOLIC BLOOD PRESSURE >= 140 MM HG 8/17/2020 Mike Tong MD S$GLB, CPTII 1   3008F MT BODY MASS INDEX (BMI) DOCUMENTED 8/17/2020 Mike Tong MD S$GLB, CPTII 1        Level of Service  Level of Service   MT OFFICE/OUTPT VISIT,TANISHA WISE IV [91087]             BestPractice Advisories  Click to view BestPractice Advisory history        AVS Reports  Date/Time Report Action User   8/17/2020 9:08 AM After Visit Summary Printed Lamar Foote LPN        Encounter-Level Documents - 08/17/2020:  After Visit Summary - Document on 8/17/2020 9:08 AM by Lamar Foote LPN: After Visit Summary                                         Orders Placed  None     Medication Changes    hyoscyamine sulfate 0.375 mg Oral 2 times daily     Medication List        Fall Risk  Patient Mobility Status: Ambulatory     Number of falls in the past 12 months?: 0   Fall Risk?: No           Visit Diagnoses    Gastroesophageal reflux disease with esophagitis   Hiatal hernia   Diverticulosis of colon   History of laparoscopic cholecystectomy   Esophageal stricture   Dysphagia, unspecified type     Problem List                    She is here for upper endoscopy and esophageal dilatation.  She has had pyrosis dyspepsia with occasional dysphagia.  The barium swallow shows a stricture.  She has good health knowledge.  She understands the procedures.  Specifically she realizes there is an extremely small incidence of bleeding perforation or aspiration.  History and physical are unchanged.  Physical examination.  Well-nourished well-hydrated nonicteric white female.  She is normocephalic.  Pupils are normal.  Lungs reveal symmetrical respirations.  Heart reveals regular rhythm.  The abdomen is soft without tenderness masses organomegaly.  Bowel sounds are.  Neurologic review she is oriented x3.

## 2020-08-19 NOTE — OP NOTE
Procedure.  Esophageal gastroduodenoscopy with biopsies and esophageal dilatation use the guidewire and Savary dilators.  Indications dysphagia with epigastric pain with pyrosis and dyspepsia.  She has good health knowledge and she understands the procedures of upper endoscopy.  Specifically she realizes there is an extremely small incidence of bleeding perforation and aspiration.  Anesthesia.  Monitored anesthesia coverage.  Procedure.  With the patient in the procedure room left lateral supine position.  The Olympus video upper endoscope was passed without difficulty.  The hyperemic narrowed gastro esophageal junction was at 38-39 cm with the SQ junction at 30-39 cm.  Diaphragmatic hiatus appeared to be at 39-40 cm.  The cardia revealed a small hiatal hernia.  The cardia body and antrum was diffusely hyperemic.  There was minimal retained secretions.  The friable pre-pyloric mucosa was biopsied.  Biopsies were obtained to the lesser curvature greater curvature and antrum.  There was minimal deformity of the pylorus.  There appeared to be a punctate erosion and the pyloric channel.  First and 2nd parts of the duodenum were normal.  The scope was withdrawn into the stomach and the guidewire was placed.  The Savary 16.  Was passed.  Patient tolerated the procedure well.  Impression 1.  Hiatal hernia 2.  Esophageal stricture 3.  Esophagitis LA grade A 4.  Gastritis 5.  Channel ulcer.

## 2020-08-19 NOTE — ANESTHESIA POSTPROCEDURE EVALUATION
Anesthesia Post Evaluation    Patient: Janina Klein    Procedure(s) Performed: Procedure(s) (LRB):  EGD W/ DILAT (ESOPHAGOGASTRODUODENOSCOPY WITH DILATION) (N/A)    Final Anesthesia Type: general    Patient location during evaluation: PACU  Patient participation: Yes- Able to Participate  Level of consciousness: awake and awake and alert  Post-procedure vital signs: reviewed and stable  Pain management: adequate  Airway patency: patent    PONV status at discharge: No PONV  Anesthetic complications: no      Cardiovascular status: blood pressure returned to baseline  Respiratory status: unassisted and spontaneous ventilation  Hydration status: euvolemic  Follow-up not needed.          Vitals Value Taken Time   /102 08/19/20 1052   Temp 98 08/19/20 1111   Pulse 60 08/19/20 1102   Resp 16 08/19/20 1102   SpO2 99 % 08/19/20 1050   Vitals shown include unvalidated device data.      Event Time   Out of Recovery 10:45:00         Pain/Kit Score: Kit Score: 10 (8/19/2020 10:45 AM)  Modified Kit Score: 19 (8/19/2020 11:00 AM)

## 2020-08-19 NOTE — DISCHARGE INSTRUCTIONS
[facility]   [phone]  [facility]      [phone]    Managing nausea    Some people have an upset stomach after surgery. This is often because of anesthesia, pain, or pain medicine, or the stress of surgery. These tips will help you handle nausea and eat healthy foods as you get better. If you were on a special food plan before surgery, ask your healthcare provider if you should follow it while you get better. These tips may help:  · Do not push yourself to eat. Your body will tell you when to eat and how much.  · Start off with clear liquids and soup. They are easier to digest.  · Next try semi-solid foods, such as mashed potatoes, applesauce, and gelatin, as you feel ready.  · Slowly move to solid foods. Dont eat fatty, rich, or spicy foods at first.  · Do not force yourself to have 3 large meals a day. Instead eat smaller amounts more often.  · Take pain medicines with a small amount of solid food, such as crackers or toast, to avoid nausea.

## 2020-08-20 DIAGNOSIS — R19.7 DIARRHEA, UNSPECIFIED TYPE: Primary | ICD-10-CM

## 2020-08-24 LAB
FINAL PATHOLOGIC DIAGNOSIS: NORMAL
GROSS: NORMAL
MICROSCOPIC EXAM: NORMAL

## 2020-11-24 ENCOUNTER — HOSPITAL ENCOUNTER (EMERGENCY)
Facility: HOSPITAL | Age: 60
Discharge: HOME | End: 2020-11-24
Attending: EMERGENCY MEDICINE
Payer: MEDICARE

## 2020-11-24 VITALS
RESPIRATION RATE: 16 BRPM | BODY MASS INDEX: 24.03 KG/M2 | OXYGEN SATURATION: 95 % | SYSTOLIC BLOOD PRESSURE: 149 MMHG | WEIGHT: 140 LBS | HEART RATE: 78 BPM | DIASTOLIC BLOOD PRESSURE: 99 MMHG | TEMPERATURE: 98 F

## 2020-11-24 DIAGNOSIS — K52.9 GASTROENTERITIS: Primary | ICD-10-CM

## 2020-11-24 DIAGNOSIS — R11.0 NAUSEA: ICD-10-CM

## 2020-11-24 LAB
ALBUMIN SERPL BCP-MCNC: 4.4 G/DL (ref 3.5–5.2)
ALP SERPL-CCNC: 79 U/L (ref 55–135)
ALT SERPL W/O P-5'-P-CCNC: 12 U/L (ref 10–44)
ANION GAP SERPL CALC-SCNC: 9 MMOL/L (ref 8–16)
AST SERPL-CCNC: 16 U/L (ref 10–40)
BASOPHILS # BLD AUTO: 0.05 K/UL (ref 0–0.2)
BASOPHILS NFR BLD: 0.6 % (ref 0–1.9)
BILIRUB SERPL-MCNC: 0.2 MG/DL (ref 0.1–1)
BUN SERPL-MCNC: 12 MG/DL (ref 6–20)
CALCIUM SERPL-MCNC: 9.7 MG/DL (ref 8.7–10.5)
CHLORIDE SERPL-SCNC: 106 MMOL/L (ref 95–110)
CO2 SERPL-SCNC: 22 MMOL/L (ref 23–29)
CREAT SERPL-MCNC: 0.7 MG/DL (ref 0.5–1.4)
DIFFERENTIAL METHOD: NORMAL
EOSINOPHIL # BLD AUTO: 0.1 K/UL (ref 0–0.5)
EOSINOPHIL NFR BLD: 0.9 % (ref 0–8)
ERYTHROCYTE [DISTWIDTH] IN BLOOD BY AUTOMATED COUNT: 12.6 % (ref 11.5–14.5)
EST. GFR  (AFRICAN AMERICAN): >60 ML/MIN/1.73 M^2
EST. GFR  (NON AFRICAN AMERICAN): >60 ML/MIN/1.73 M^2
GLUCOSE SERPL-MCNC: 98 MG/DL (ref 70–110)
HCT VFR BLD AUTO: 42.8 % (ref 37–48.5)
HGB BLD-MCNC: 14.4 G/DL (ref 12–16)
IMM GRANULOCYTES # BLD AUTO: 0.03 K/UL (ref 0–0.04)
IMM GRANULOCYTES NFR BLD AUTO: 0.3 % (ref 0–0.5)
LIPASE SERPL-CCNC: 20 U/L (ref 4–60)
LYMPHOCYTES # BLD AUTO: 3.1 K/UL (ref 1–4.8)
LYMPHOCYTES NFR BLD: 34.5 % (ref 18–48)
MCH RBC QN AUTO: 30.3 PG (ref 27–31)
MCHC RBC AUTO-ENTMCNC: 33.6 G/DL (ref 32–36)
MCV RBC AUTO: 90 FL (ref 82–98)
MONOCYTES # BLD AUTO: 0.4 K/UL (ref 0.3–1)
MONOCYTES NFR BLD: 4.6 % (ref 4–15)
NEUTROPHILS # BLD AUTO: 5.3 K/UL (ref 1.8–7.7)
NEUTROPHILS NFR BLD: 59.1 % (ref 38–73)
NRBC BLD-RTO: 0 /100 WBC
PLATELET # BLD AUTO: 259 K/UL (ref 150–350)
PMV BLD AUTO: 9.2 FL (ref 9.2–12.9)
POTASSIUM SERPL-SCNC: 3.7 MMOL/L (ref 3.5–5.1)
PROT SERPL-MCNC: 7 G/DL (ref 6–8.4)
RBC # BLD AUTO: 4.75 M/UL (ref 4–5.4)
SODIUM SERPL-SCNC: 137 MMOL/L (ref 136–145)
WBC # BLD AUTO: 8.9 K/UL (ref 3.9–12.7)

## 2020-11-24 PROCEDURE — 96361 HYDRATE IV INFUSION ADD-ON: CPT

## 2020-11-24 PROCEDURE — 96374 THER/PROPH/DIAG INJ IV PUSH: CPT

## 2020-11-24 PROCEDURE — 99284 EMERGENCY DEPT VISIT MOD MDM: CPT | Mod: 25

## 2020-11-24 PROCEDURE — 80053 COMPREHEN METABOLIC PANEL: CPT

## 2020-11-24 PROCEDURE — 85025 COMPLETE CBC W/AUTO DIFF WBC: CPT

## 2020-11-24 PROCEDURE — 63600175 PHARM REV CODE 636 W HCPCS: Performed by: EMERGENCY MEDICINE

## 2020-11-24 PROCEDURE — 25000003 PHARM REV CODE 250: Performed by: EMERGENCY MEDICINE

## 2020-11-24 PROCEDURE — 83690 ASSAY OF LIPASE: CPT

## 2020-11-24 RX ORDER — SODIUM CHLORIDE 9 MG/ML
1000 INJECTION, SOLUTION INTRAVENOUS
Status: COMPLETED | OUTPATIENT
Start: 2020-11-24 | End: 2020-11-24

## 2020-11-24 RX ORDER — ONDANSETRON 4 MG/1
4 TABLET, FILM COATED ORAL EVERY 6 HOURS PRN
Qty: 20 TABLET | Refills: 0 | Status: SHIPPED | OUTPATIENT
Start: 2020-11-24 | End: 2021-11-12 | Stop reason: SDUPTHER

## 2020-11-24 RX ORDER — ONDANSETRON 2 MG/ML
4 INJECTION INTRAMUSCULAR; INTRAVENOUS
Status: COMPLETED | OUTPATIENT
Start: 2020-11-24 | End: 2020-11-24

## 2020-11-24 RX ADMIN — SODIUM CHLORIDE 1000 ML: 0.9 INJECTION, SOLUTION INTRAVENOUS at 04:11

## 2020-11-24 RX ADMIN — ONDANSETRON HYDROCHLORIDE 4 MG: 2 SOLUTION INTRAMUSCULAR; INTRAVENOUS at 04:11

## 2020-11-24 NOTE — ED PROVIDER NOTES
Encounter Date: 2020       History     Chief Complaint   Patient presents with    Generalized Body Aches     onset 1 week ago vomiting and diarrhea no sx for 3 days      Patient is here for evaluation of vomiting and diarrhea.  Patient has been symptomatic for about a week.  Patient continues to have a problem with decreased appetite.  She denies any blood in either emesis or stool.  She notes some pain in the mid epigastric region that radiates to her back.  Patient has a history of ulcers but denies a history of colitis.  She has had chills but no fever.  No sick contacts at home.  Symptoms are moderate, intermittent, no exacerbating or alleviating factors.        Review of patient's allergies indicates:   Allergen Reactions    Lisinopril      Past Medical History:   Diagnosis Date    Anxiety     Arthritis     osteoarthritis    Chronic pain     Chronic pain     COPD (chronic obstructive pulmonary disease)     GERD (gastroesophageal reflux disease)     History of stomach ulcers     Hypertension     IBS (irritable bowel syndrome)     [facility] Diagnosed 2004    Palpitations      Past Surgical History:   Procedure Laterality Date    APPENDECTOMY       SECTION      [place]      ? [place] in [place]    COLONOSCOPY  2015    Dr. Farfan   diverticulosis    COLONOSCOPY N/A 2020    Procedure: COLONOSCOPY;  Surgeon: Casey Farfan MD;  Location: [facility];  Service: General;  Laterality: N/A;    CYSTOSCOPY      ESOPHAGOGASTRODUODENOSCOPY N/A 2020    Procedure: ESOPHAGOGASTRODUODENOSCOPY (EGD);  Surgeon: Casey Farfan MD;  Location: [facility];  Service: General;  Laterality: N/A;  WITH BXS    ESOPHAGOGASTRODUODENOSCOPY N/A 2020    Procedure: EGD W/ DILAT (ESOPHAGOGASTRODUODENOSCOPY WITH DILATION);  Surgeon: Mike Tong MD;  Location: [facility];  Service: Endoscopy;  Laterality: N/A;    HERNIA REPAIR      HYSTERECTOMY  9/3/2014    with bso     LAPAROSCOPIC CHOLECYSTECTOMY N/A 5/26/2020    Procedure: CHOLECYSTECTOMY-LAPAROSCOPIC;  Surgeon: Casey Farfan MD;  [facility] OR;  Service: General;  Laterality: N/A;    TOTAL ABDOMINAL HYSTERECTOMY W/ BILATERAL SALPINGOOPHORECTOMY Bilateral      Family History   Problem Relation Age of Onset    Hypertension Mother     Diabetes Sister     Colon cancer Maternal Grandmother     Breast cancer Other      Social History     Tobacco Use    Smoking status: Current Every Day Smoker     Packs/day: 0.50     Types: Cigarettes    Smokeless tobacco: Never Used   Substance Use Topics    Alcohol use: No     Frequency: Never     Binge frequency: Never    Drug use: No     Review of Systems   Constitutional: Negative for fever.   HENT: Negative for sore throat.    Respiratory: Negative for shortness of breath.    Cardiovascular: Negative for chest pain.   Gastrointestinal: Positive for diarrhea, nausea and vomiting.   All other systems reviewed and are negative.      Physical Exam     Initial Vitals [11/24/20 1533]   BP Pulse Resp Temp SpO2   (!) 157/115 90 17 98 °F (36.7 °C) 95 %      MAP       --         Physical Exam    Nursing note and vitals reviewed.  Constitutional: She appears well-developed and well-nourished. No distress.   HENT:   Head: Normocephalic and atraumatic.   Right Ear: External ear normal.   Left Ear: External ear normal.   Nose: Nose normal.   Mouth/Throat: Oropharynx is clear and moist.   Eyes: Conjunctivae and EOM are normal. Pupils are equal, round, and reactive to light.   Neck: Normal range of motion. Neck supple.   Cardiovascular: Normal rate, regular rhythm and normal heart sounds.   Pulmonary/Chest: Breath sounds normal.   Abdominal: Soft. She exhibits no distension. There is no abdominal tenderness.   Musculoskeletal: Normal range of motion.   Neurological: She is alert and oriented to person, place, and time. She has normal strength. No cranial nerve deficit.   Skin: Skin is warm and  dry.   Psychiatric: She has a normal mood and affect. Thought content normal.         ED Course   Procedures  Labs Reviewed   COMPREHENSIVE METABOLIC PANEL - Abnormal; Notable for the following components:       Result Value    CO2 22 (*)     All other components within normal limits   CBC W/ AUTO DIFFERENTIAL   LIPASE          Imaging Results    None          Medical Decision Making:   Initial Assessment:   Patient is feeling better with treatment in the ER.  Her labs are unremarkable.  Patient will be discharged home with medicine for symptomatic relief.  Recommend follow-up with primary care.  Return to the ER for worsening symptoms.                             Clinical Impression:     ICD-10-CM ICD-9-CM   1. Gastroenteritis  K52.9 558.9   2. Nausea  R11.0 787.02                          ED Disposition Condition    Discharge Stable        ED Prescriptions     Medication Sig Dispense Start Date End Date Auth. Provider    ondansetron (ZOFRAN) 4 MG tablet Take 1 tablet (4 mg total) by mouth every 6 (six) hours as needed. 20 tablet 11/24/2020  Kole Doyle MD        Follow-up Information     Follow up With Specialties Details Why Contact Info    Tremaine Fuentes MD Family Medicine In 3 days  [address]  [facility] [address]  [phone]      [facility] - ED Emergency Medicine  If symptoms worsen [address]  [facility] [address] [phone]                                       Kole Dolye MD  11/24/20 7592

## 2020-11-24 NOTE — ED TRIAGE NOTES
To er with c/o eneralized weakness. Started vomiting 1 week ago, with diarrhea. Has been 3 days since she has had any sx. Just feelis tired.

## 2021-02-12 ENCOUNTER — OFFICE VISIT (OUTPATIENT)
Dept: GASTROENTEROLOGY | Facility: CLINIC | Age: 61
End: 2021-02-12
Payer: MEDICARE

## 2021-02-12 ENCOUNTER — LAB VISIT (OUTPATIENT)
Dept: LAB | Facility: HOSPITAL | Age: 61
End: 2021-02-12
Attending: INTERNAL MEDICINE
Payer: MEDICARE

## 2021-02-12 VITALS
HEIGHT: 64 IN | WEIGHT: 138 LBS | SYSTOLIC BLOOD PRESSURE: 130 MMHG | HEART RATE: 80 BPM | RESPIRATION RATE: 15 BRPM | BODY MASS INDEX: 23.56 KG/M2 | DIASTOLIC BLOOD PRESSURE: 83 MMHG | TEMPERATURE: 98 F | OXYGEN SATURATION: 97 %

## 2021-02-12 DIAGNOSIS — R19.7 DIARRHEA, UNSPECIFIED TYPE: Primary | ICD-10-CM

## 2021-02-12 DIAGNOSIS — K21.9 GASTROESOPHAGEAL REFLUX DISEASE, UNSPECIFIED WHETHER ESOPHAGITIS PRESENT: ICD-10-CM

## 2021-02-12 DIAGNOSIS — R10.9 ABDOMINAL PAIN, UNSPECIFIED ABDOMINAL LOCATION: ICD-10-CM

## 2021-02-12 DIAGNOSIS — Z90.49 HISTORY OF LAPAROSCOPIC CHOLECYSTECTOMY: ICD-10-CM

## 2021-02-12 DIAGNOSIS — R19.7 DIARRHEA, UNSPECIFIED TYPE: ICD-10-CM

## 2021-02-12 DIAGNOSIS — K57.30 DIVERTICULOSIS OF COLON: ICD-10-CM

## 2021-02-12 DIAGNOSIS — Z87.19 HISTORY OF ESOPHAGEAL STRICTURE: ICD-10-CM

## 2021-02-12 DIAGNOSIS — K20.90 ESOPHAGITIS: ICD-10-CM

## 2021-02-12 DIAGNOSIS — K44.9 HIATAL HERNIA: ICD-10-CM

## 2021-02-12 PROBLEM — K22.2 ESOPHAGEAL STRICTURE: Status: RESOLVED | Noted: 2020-08-18 | Resolved: 2021-02-12

## 2021-02-12 PROBLEM — R13.10 DYSPHAGIA: Status: RESOLVED | Noted: 2020-08-18 | Resolved: 2021-02-12

## 2021-02-12 LAB
ALBUMIN SERPL BCP-MCNC: 4.3 G/DL (ref 3.5–5.2)
ALP SERPL-CCNC: 88 U/L (ref 55–135)
ALT SERPL W/O P-5'-P-CCNC: 14 U/L (ref 10–44)
ANION GAP SERPL CALC-SCNC: 8 MMOL/L (ref 8–16)
AST SERPL-CCNC: 17 U/L (ref 10–40)
BASOPHILS # BLD AUTO: 0.05 K/UL (ref 0–0.2)
BASOPHILS NFR BLD: 0.7 % (ref 0–1.9)
BILIRUB SERPL-MCNC: 0.4 MG/DL (ref 0.1–1)
BUN SERPL-MCNC: 17 MG/DL (ref 6–20)
CALCIUM SERPL-MCNC: 9.3 MG/DL (ref 8.7–10.5)
CHLORIDE SERPL-SCNC: 102 MMOL/L (ref 95–110)
CO2 SERPL-SCNC: 28 MMOL/L (ref 23–29)
CREAT SERPL-MCNC: 0.8 MG/DL (ref 0.5–1.4)
CRP SERPL-MCNC: 0.07 MG/DL (ref 0–0.75)
DIFFERENTIAL METHOD: NORMAL
EOSINOPHIL # BLD AUTO: 0.1 K/UL (ref 0–0.5)
EOSINOPHIL NFR BLD: 0.9 % (ref 0–8)
ERYTHROCYTE [DISTWIDTH] IN BLOOD BY AUTOMATED COUNT: 13 % (ref 11.5–14.5)
EST. GFR  (AFRICAN AMERICAN): >60 ML/MIN/1.73 M^2
EST. GFR  (NON AFRICAN AMERICAN): >60 ML/MIN/1.73 M^2
GLUCOSE SERPL-MCNC: 86 MG/DL (ref 70–110)
HCT VFR BLD AUTO: 45.4 % (ref 37–48.5)
HGB BLD-MCNC: 14.7 G/DL (ref 12–16)
IMM GRANULOCYTES # BLD AUTO: 0.02 K/UL (ref 0–0.04)
IMM GRANULOCYTES NFR BLD AUTO: 0.3 % (ref 0–0.5)
LYMPHOCYTES # BLD AUTO: 2.7 K/UL (ref 1–4.8)
LYMPHOCYTES NFR BLD: 38.3 % (ref 18–48)
MCH RBC QN AUTO: 29.8 PG (ref 27–31)
MCHC RBC AUTO-ENTMCNC: 32.4 G/DL (ref 32–36)
MCV RBC AUTO: 92 FL (ref 82–98)
MONOCYTES # BLD AUTO: 0.4 K/UL (ref 0.3–1)
MONOCYTES NFR BLD: 5.1 % (ref 4–15)
NEUTROPHILS # BLD AUTO: 3.8 K/UL (ref 1.8–7.7)
NEUTROPHILS NFR BLD: 54.7 % (ref 38–73)
NRBC BLD-RTO: 0 /100 WBC
PLATELET # BLD AUTO: 201 K/UL (ref 150–350)
PMV BLD AUTO: 9.7 FL (ref 9.2–12.9)
POTASSIUM SERPL-SCNC: 4 MMOL/L (ref 3.5–5.1)
PROT SERPL-MCNC: 6.8 G/DL (ref 6–8.4)
RBC # BLD AUTO: 4.94 M/UL (ref 4–5.4)
SODIUM SERPL-SCNC: 138 MMOL/L (ref 136–145)
WBC # BLD AUTO: 7.02 K/UL (ref 3.9–12.7)

## 2021-02-12 PROCEDURE — 80053 COMPREHEN METABOLIC PANEL: CPT

## 2021-02-12 PROCEDURE — 99214 PR OFFICE/OUTPT VISIT, EST, LEVL IV, 30-39 MIN: ICD-10-PCS | Mod: S$GLB,,, | Performed by: INTERNAL MEDICINE

## 2021-02-12 PROCEDURE — 99999 PR PBB SHADOW E&M-EST. PATIENT-LVL IV: CPT | Mod: PBBFAC,,, | Performed by: INTERNAL MEDICINE

## 2021-02-12 PROCEDURE — 99999 PR PBB SHADOW E&M-EST. PATIENT-LVL IV: ICD-10-PCS | Mod: PBBFAC,,, | Performed by: INTERNAL MEDICINE

## 2021-02-12 PROCEDURE — 85025 COMPLETE CBC W/AUTO DIFF WBC: CPT

## 2021-02-12 PROCEDURE — 86140 C-REACTIVE PROTEIN: CPT

## 2021-02-12 PROCEDURE — 99214 OFFICE O/P EST MOD 30 MIN: CPT | Mod: S$GLB,,, | Performed by: INTERNAL MEDICINE

## 2021-02-12 PROCEDURE — 83516 IMMUNOASSAY NONANTIBODY: CPT | Mod: 59

## 2021-02-12 PROCEDURE — 99214 OFFICE O/P EST MOD 30 MIN: CPT | Mod: PBBFAC,PN | Performed by: INTERNAL MEDICINE

## 2021-02-12 PROCEDURE — 36415 COLL VENOUS BLD VENIPUNCTURE: CPT

## 2021-02-12 RX ORDER — TIZANIDINE 4 MG/1
4 TABLET ORAL 3 TIMES DAILY PRN
COMMUNITY
Start: 2020-12-12 | End: 2022-03-02 | Stop reason: SDUPTHER

## 2021-02-12 RX ORDER — DICYCLOMINE HYDROCHLORIDE 20 MG/1
20 TABLET ORAL
Qty: 90 TABLET | Refills: 5 | Status: SHIPPED | OUTPATIENT
Start: 2021-02-12 | End: 2021-08-11

## 2021-02-12 RX ORDER — SUCRALFATE 1 G/1
2 TABLET ORAL DAILY
Qty: 60 TABLET | Refills: 11 | Status: SHIPPED | OUTPATIENT
Start: 2021-02-12 | End: 2022-02-21

## 2021-02-12 RX ORDER — PANTOPRAZOLE SODIUM 40 MG/1
40 TABLET, DELAYED RELEASE ORAL DAILY
Qty: 90 TABLET | Refills: 3 | Status: SHIPPED | OUTPATIENT
Start: 2021-02-12 | End: 2021-12-27

## 2021-02-15 LAB
GLIADIN PEPTIDE IGA SER-ACNC: 10 UNITS
GLIADIN PEPTIDE IGG SER-ACNC: 2 UNITS
IGA SERPL-MCNC: 218 MG/DL (ref 70–400)
TTG IGA SER-ACNC: 5 UNITS
TTG IGG SER-ACNC: 2 UNITS

## 2021-02-22 ENCOUNTER — LAB VISIT (OUTPATIENT)
Dept: LAB | Facility: HOSPITAL | Age: 61
End: 2021-02-22
Attending: INTERNAL MEDICINE
Payer: MEDICARE

## 2021-02-22 DIAGNOSIS — R19.7 DIARRHEA, UNSPECIFIED TYPE: ICD-10-CM

## 2021-02-22 PROCEDURE — 87507 IADNA-DNA/RNA PROBE TQ 12-25: CPT

## 2021-02-22 PROCEDURE — 82656 EL-1 FECAL QUAL/SEMIQ: CPT

## 2021-02-23 ENCOUNTER — PATIENT MESSAGE (OUTPATIENT)
Dept: GASTROENTEROLOGY | Facility: CLINIC | Age: 61
End: 2021-02-23

## 2021-02-25 LAB — ELASTASE 1, FECAL: 322 MCG/G

## 2021-02-26 LAB
GPP - ADENOVIRUS 40/41: NOT DETECTED
GPP - CAMPYLOBACTER: NOT DETECTED
GPP - CRYPTOSPORIDIUM: NOT DETECTED
GPP - E COLI O157: NOT DETECTED
GPP - ENTAMOEBA HISTOLYTICA: NOT DETECTED
GPP - ENTEROTOXIGENIC E COLI (ETEC): NOT DETECTED
GPP - GIARDIA LAMBLIA: NOT DETECTED
GPP - NOROVIRUS GI/GII: NOT DETECTED
GPP - ROTAVIRUS A: NOT DETECTED
GPP - SALMONELLA: NOT DETECTED
GPP - SHIGELLA: NOT DETECTED
GPP - VIBRIO CHOLERA: NOT DETECTED
GPP - YERSINIA ENTEROCOLITICA: NOT DETECTED
LACTATE PLASV-SCNC: NOT DETECTED MMOL/L

## 2021-04-26 DIAGNOSIS — M54.50 LOW BACK PAIN: Primary | ICD-10-CM

## 2021-05-06 ENCOUNTER — HOSPITAL ENCOUNTER (OUTPATIENT)
Dept: RADIOLOGY | Facility: HOSPITAL | Age: 61
Discharge: HOME | End: 2021-05-06
Attending: FAMILY MEDICINE
Payer: MEDICARE

## 2021-05-06 DIAGNOSIS — M54.9 UPPER BACK PAIN: ICD-10-CM

## 2021-05-06 DIAGNOSIS — M54.2 NECK PAIN: Primary | ICD-10-CM

## 2021-05-06 DIAGNOSIS — M54.50 LOW BACK PAIN: ICD-10-CM

## 2021-05-06 DIAGNOSIS — M25.60 DECREASED RANGE OF MOTION: ICD-10-CM

## 2021-05-06 PROCEDURE — 72148 MRI LUMBAR SPINE WITHOUT CONTRAST: ICD-10-PCS | Mod: 26,,, | Performed by: RADIOLOGY

## 2021-05-06 PROCEDURE — 72148 MRI LUMBAR SPINE W/O DYE: CPT | Mod: 26,,, | Performed by: RADIOLOGY

## 2021-05-06 PROCEDURE — 72148 MRI LUMBAR SPINE W/O DYE: CPT | Mod: TC

## 2021-10-05 ENCOUNTER — HOSPITAL ENCOUNTER (OUTPATIENT)
Dept: RADIOLOGY | Facility: HOSPITAL | Age: 61
Discharge: HOME | End: 2021-10-05
Attending: NURSE PRACTITIONER
Payer: MEDICARE

## 2021-10-05 VITALS — WEIGHT: 147.06 LBS | HEIGHT: 64 IN | BODY MASS INDEX: 25.11 KG/M2

## 2021-10-05 DIAGNOSIS — Z78.0 POSTMENOPAUSAL: ICD-10-CM

## 2021-10-05 DIAGNOSIS — Z12.31 SCREENING MAMMOGRAM, ENCOUNTER FOR: ICD-10-CM

## 2021-10-05 PROCEDURE — 77063 MAMMO DIGITAL SCREENING BILAT WITH TOMO: ICD-10-PCS | Mod: 26,,, | Performed by: RADIOLOGY

## 2021-10-05 PROCEDURE — 77067 SCR MAMMO BI INCL CAD: CPT | Mod: TC

## 2021-10-05 PROCEDURE — 77080 DXA BONE DENSITY AXIAL: CPT | Mod: 26,,, | Performed by: RADIOLOGY

## 2021-10-05 PROCEDURE — 77080 DEXA BONE DENSITY SPINE HIP: ICD-10-PCS | Mod: 26,,, | Performed by: RADIOLOGY

## 2021-10-05 PROCEDURE — 77067 SCR MAMMO BI INCL CAD: CPT | Mod: 26,,, | Performed by: RADIOLOGY

## 2021-10-05 PROCEDURE — 77067 MAMMO DIGITAL SCREENING BILAT WITH TOMO: ICD-10-PCS | Mod: 26,,, | Performed by: RADIOLOGY

## 2021-10-05 PROCEDURE — 77063 BREAST TOMOSYNTHESIS BI: CPT | Mod: 26,,, | Performed by: RADIOLOGY

## 2021-10-05 PROCEDURE — 77080 DXA BONE DENSITY AXIAL: CPT | Mod: TC

## 2021-11-12 ENCOUNTER — OFFICE VISIT (OUTPATIENT)
Dept: FAMILY MEDICINE | Facility: CLINIC | Age: 61
End: 2021-11-12
Payer: MEDICARE

## 2021-11-12 VITALS
OXYGEN SATURATION: 98 % | HEART RATE: 74 BPM | RESPIRATION RATE: 16 BRPM | DIASTOLIC BLOOD PRESSURE: 86 MMHG | WEIGHT: 149.38 LBS | BODY MASS INDEX: 25.5 KG/M2 | HEIGHT: 64 IN | SYSTOLIC BLOOD PRESSURE: 135 MMHG

## 2021-11-12 DIAGNOSIS — M54.2 CERVICALGIA: Primary | ICD-10-CM

## 2021-11-12 DIAGNOSIS — R11.0 NAUSEA: ICD-10-CM

## 2021-11-12 DIAGNOSIS — Z23 NEED FOR VACCINATION: ICD-10-CM

## 2021-11-12 DIAGNOSIS — M54.6 PAIN IN THORACIC SPINE: ICD-10-CM

## 2021-11-12 PROCEDURE — 1159F PR MEDICATION LIST DOCUMENTED IN MEDICAL RECORD: ICD-10-PCS | Mod: CPTII,S$GLB,, | Performed by: FAMILY MEDICINE

## 2021-11-12 PROCEDURE — 3079F PR MOST RECENT DIASTOLIC BLOOD PRESSURE 80-89 MM HG: ICD-10-PCS | Mod: CPTII,S$GLB,, | Performed by: FAMILY MEDICINE

## 2021-11-12 PROCEDURE — 90686 IIV4 VACC NO PRSV 0.5 ML IM: CPT | Mod: S$GLB,,, | Performed by: FAMILY MEDICINE

## 2021-11-12 PROCEDURE — 99203 PR OFFICE/OUTPT VISIT, NEW, LEVL III, 30-44 MIN: ICD-10-PCS | Mod: 25,S$GLB,, | Performed by: FAMILY MEDICINE

## 2021-11-12 PROCEDURE — 3008F PR BODY MASS INDEX (BMI) DOCUMENTED: ICD-10-PCS | Mod: CPTII,S$GLB,, | Performed by: FAMILY MEDICINE

## 2021-11-12 PROCEDURE — 99999 PR PBB SHADOW E&M-EST. PATIENT-LVL IV: CPT | Mod: PBBFAC,,, | Performed by: FAMILY MEDICINE

## 2021-11-12 PROCEDURE — G0008 FLU VACCINE (QUAD) GREATER THAN OR EQUAL TO 3YO PRESERVATIVE FREE IM: ICD-10-PCS | Mod: S$GLB,,, | Performed by: FAMILY MEDICINE

## 2021-11-12 PROCEDURE — 3075F PR MOST RECENT SYSTOLIC BLOOD PRESS GE 130-139MM HG: ICD-10-PCS | Mod: CPTII,S$GLB,, | Performed by: FAMILY MEDICINE

## 2021-11-12 PROCEDURE — 99203 OFFICE O/P NEW LOW 30 MIN: CPT | Mod: 25,S$GLB,, | Performed by: FAMILY MEDICINE

## 2021-11-12 PROCEDURE — 3075F SYST BP GE 130 - 139MM HG: CPT | Mod: CPTII,S$GLB,, | Performed by: FAMILY MEDICINE

## 2021-11-12 PROCEDURE — G0008 ADMIN INFLUENZA VIRUS VAC: HCPCS | Mod: S$GLB,,, | Performed by: FAMILY MEDICINE

## 2021-11-12 PROCEDURE — 99999 PR PBB SHADOW E&M-EST. PATIENT-LVL IV: ICD-10-PCS | Mod: PBBFAC,,, | Performed by: FAMILY MEDICINE

## 2021-11-12 PROCEDURE — 3008F BODY MASS INDEX DOCD: CPT | Mod: CPTII,S$GLB,, | Performed by: FAMILY MEDICINE

## 2021-11-12 PROCEDURE — 3079F DIAST BP 80-89 MM HG: CPT | Mod: CPTII,S$GLB,, | Performed by: FAMILY MEDICINE

## 2021-11-12 PROCEDURE — 1159F MED LIST DOCD IN RCRD: CPT | Mod: CPTII,S$GLB,, | Performed by: FAMILY MEDICINE

## 2021-11-12 PROCEDURE — 90686 FLU VACCINE (QUAD) GREATER THAN OR EQUAL TO 3YO PRESERVATIVE FREE IM: ICD-10-PCS | Mod: S$GLB,,, | Performed by: FAMILY MEDICINE

## 2021-11-12 RX ORDER — ONDANSETRON 4 MG/1
4 TABLET, FILM COATED ORAL EVERY 6 HOURS PRN
Qty: 20 TABLET | Refills: 0 | Status: SHIPPED | OUTPATIENT
Start: 2021-11-12 | End: 2022-03-02 | Stop reason: SDUPTHER

## 2021-12-08 ENCOUNTER — HOSPITAL ENCOUNTER (OUTPATIENT)
Dept: RADIOLOGY | Facility: HOSPITAL | Age: 61
Discharge: HOME | End: 2021-12-08
Attending: FAMILY MEDICINE
Payer: MEDICARE

## 2021-12-08 DIAGNOSIS — M54.6 PAIN IN THORACIC SPINE: ICD-10-CM

## 2021-12-08 DIAGNOSIS — M54.2 CERVICALGIA: ICD-10-CM

## 2021-12-08 PROCEDURE — 72141 MRI NECK SPINE W/O DYE: CPT | Mod: TC

## 2021-12-08 PROCEDURE — 72141 MRI CERVICAL SPINE WITHOUT CONTRAST: ICD-10-PCS | Mod: 26,,, | Performed by: RADIOLOGY

## 2021-12-08 PROCEDURE — 72146 MRI CHEST SPINE W/O DYE: CPT | Mod: TC

## 2021-12-08 PROCEDURE — 72146 MRI CHEST SPINE W/O DYE: CPT | Mod: 26,,, | Performed by: RADIOLOGY

## 2021-12-08 PROCEDURE — 72141 MRI NECK SPINE W/O DYE: CPT | Mod: 26,,, | Performed by: RADIOLOGY

## 2021-12-08 PROCEDURE — 72146 MRI THORACIC SPINE WITHOUT CONTRAST: ICD-10-PCS | Mod: 26,,, | Performed by: RADIOLOGY

## 2021-12-09 ENCOUNTER — TELEPHONE (OUTPATIENT)
Dept: FAMILY MEDICINE | Facility: CLINIC | Age: 61
End: 2021-12-09
Payer: MEDICARE

## 2021-12-09 ENCOUNTER — PATIENT MESSAGE (OUTPATIENT)
Dept: FAMILY MEDICINE | Facility: CLINIC | Age: 61
End: 2021-12-09
Payer: MEDICARE

## 2021-12-09 DIAGNOSIS — M47.22 OSTEOARTHRITIS OF SPINE WITH RADICULOPATHY, CERVICAL REGION: Primary | ICD-10-CM

## 2021-12-13 ENCOUNTER — PATIENT MESSAGE (OUTPATIENT)
Dept: FAMILY MEDICINE | Facility: CLINIC | Age: 61
End: 2021-12-13
Payer: MEDICARE

## 2021-12-13 ENCOUNTER — TELEPHONE (OUTPATIENT)
Dept: FAMILY MEDICINE | Facility: CLINIC | Age: 61
End: 2021-12-13
Payer: MEDICARE

## 2021-12-14 ENCOUNTER — TELEPHONE (OUTPATIENT)
Dept: FAMILY MEDICINE | Facility: CLINIC | Age: 61
End: 2021-12-14
Payer: MEDICARE

## 2021-12-15 ENCOUNTER — TELEPHONE (OUTPATIENT)
Dept: FAMILY MEDICINE | Facility: CLINIC | Age: 61
End: 2021-12-15
Payer: MEDICARE

## 2021-12-16 ENCOUNTER — PATIENT MESSAGE (OUTPATIENT)
Dept: FAMILY MEDICINE | Facility: CLINIC | Age: 61
End: 2021-12-16
Payer: MEDICARE

## 2021-12-16 DIAGNOSIS — M47.22 OSTEOARTHRITIS OF SPINE WITH RADICULOPATHY, CERVICAL REGION: Primary | ICD-10-CM

## 2021-12-17 RX ORDER — TRAMADOL HYDROCHLORIDE 50 MG/1
50 TABLET ORAL EVERY 6 HOURS
Qty: 28 TABLET | Refills: 0 | Status: SHIPPED | OUTPATIENT
Start: 2021-12-17 | End: 2021-12-24

## 2021-12-27 DIAGNOSIS — K21.9 GASTROESOPHAGEAL REFLUX DISEASE, UNSPECIFIED WHETHER ESOPHAGITIS PRESENT: ICD-10-CM

## 2021-12-27 RX ORDER — PANTOPRAZOLE SODIUM 40 MG/1
TABLET, DELAYED RELEASE ORAL
Qty: 90 TABLET | Refills: 3 | Status: SHIPPED | OUTPATIENT
Start: 2021-12-27 | End: 2023-04-12 | Stop reason: SDUPTHER

## 2022-01-24 ENCOUNTER — PATIENT MESSAGE (OUTPATIENT)
Dept: FAMILY MEDICINE | Facility: CLINIC | Age: 62
End: 2022-01-24
Payer: MEDICARE

## 2022-01-24 DIAGNOSIS — Z86.69 H/O CARPAL TUNNEL SYNDROME: ICD-10-CM

## 2022-01-24 RX ORDER — GABAPENTIN 800 MG/1
800 TABLET ORAL 3 TIMES DAILY
Qty: 270 TABLET | Refills: 1 | Status: SHIPPED | OUTPATIENT
Start: 2022-01-24 | End: 2022-12-08 | Stop reason: SDUPTHER

## 2022-02-20 DIAGNOSIS — K21.9 GASTROESOPHAGEAL REFLUX DISEASE, UNSPECIFIED WHETHER ESOPHAGITIS PRESENT: ICD-10-CM

## 2022-02-21 ENCOUNTER — PATIENT MESSAGE (OUTPATIENT)
Dept: FAMILY MEDICINE | Facility: CLINIC | Age: 62
End: 2022-02-21
Payer: MEDICARE

## 2022-02-21 RX ORDER — SUCRALFATE 1 G/1
TABLET ORAL
Qty: 60 TABLET | Refills: 11 | Status: SHIPPED | OUTPATIENT
Start: 2022-02-21 | End: 2023-03-07 | Stop reason: SDUPTHER

## 2022-03-02 ENCOUNTER — PATIENT MESSAGE (OUTPATIENT)
Dept: SPINE | Facility: CLINIC | Age: 62
End: 2022-03-02
Payer: MEDICARE

## 2022-03-02 ENCOUNTER — OFFICE VISIT (OUTPATIENT)
Dept: FAMILY MEDICINE | Facility: CLINIC | Age: 62
End: 2022-03-02
Payer: MEDICARE

## 2022-03-02 ENCOUNTER — TELEPHONE (OUTPATIENT)
Dept: SPINE | Facility: CLINIC | Age: 62
End: 2022-03-02
Payer: MEDICARE

## 2022-03-02 VITALS
WEIGHT: 142.63 LBS | HEART RATE: 74 BPM | DIASTOLIC BLOOD PRESSURE: 87 MMHG | SYSTOLIC BLOOD PRESSURE: 138 MMHG | BODY MASS INDEX: 24.35 KG/M2 | OXYGEN SATURATION: 98 % | RESPIRATION RATE: 16 BRPM | HEIGHT: 64 IN

## 2022-03-02 DIAGNOSIS — M47.22 OSTEOARTHRITIS OF SPINE WITH RADICULOPATHY, CERVICAL REGION: ICD-10-CM

## 2022-03-02 DIAGNOSIS — J44.9 CHRONIC OBSTRUCTIVE PULMONARY DISEASE, UNSPECIFIED COPD TYPE: ICD-10-CM

## 2022-03-02 DIAGNOSIS — R11.0 NAUSEA: ICD-10-CM

## 2022-03-02 DIAGNOSIS — M62.838 MASSETER MUSCLE SPASM: Primary | ICD-10-CM

## 2022-03-02 PROCEDURE — 3075F PR MOST RECENT SYSTOLIC BLOOD PRESS GE 130-139MM HG: ICD-10-PCS | Mod: CPTII,S$GLB,, | Performed by: FAMILY MEDICINE

## 2022-03-02 PROCEDURE — 1159F MED LIST DOCD IN RCRD: CPT | Mod: CPTII,S$GLB,, | Performed by: FAMILY MEDICINE

## 2022-03-02 PROCEDURE — 3008F PR BODY MASS INDEX (BMI) DOCUMENTED: ICD-10-PCS | Mod: CPTII,S$GLB,, | Performed by: FAMILY MEDICINE

## 2022-03-02 PROCEDURE — 99999 PR PBB SHADOW E&M-EST. PATIENT-LVL IV: ICD-10-PCS | Mod: PBBFAC,,, | Performed by: FAMILY MEDICINE

## 2022-03-02 PROCEDURE — 99999 PR PBB SHADOW E&M-EST. PATIENT-LVL IV: CPT | Mod: PBBFAC,,, | Performed by: FAMILY MEDICINE

## 2022-03-02 PROCEDURE — 1159F PR MEDICATION LIST DOCUMENTED IN MEDICAL RECORD: ICD-10-PCS | Mod: CPTII,S$GLB,, | Performed by: FAMILY MEDICINE

## 2022-03-02 PROCEDURE — 99214 OFFICE O/P EST MOD 30 MIN: CPT | Mod: S$GLB,,, | Performed by: FAMILY MEDICINE

## 2022-03-02 PROCEDURE — 1160F RVW MEDS BY RX/DR IN RCRD: CPT | Mod: CPTII,S$GLB,, | Performed by: FAMILY MEDICINE

## 2022-03-02 PROCEDURE — 3008F BODY MASS INDEX DOCD: CPT | Mod: CPTII,S$GLB,, | Performed by: FAMILY MEDICINE

## 2022-03-02 PROCEDURE — 3079F DIAST BP 80-89 MM HG: CPT | Mod: CPTII,S$GLB,, | Performed by: FAMILY MEDICINE

## 2022-03-02 PROCEDURE — 3079F PR MOST RECENT DIASTOLIC BLOOD PRESSURE 80-89 MM HG: ICD-10-PCS | Mod: CPTII,S$GLB,, | Performed by: FAMILY MEDICINE

## 2022-03-02 PROCEDURE — 99214 PR OFFICE/OUTPT VISIT, EST, LEVL IV, 30-39 MIN: ICD-10-PCS | Mod: S$GLB,,, | Performed by: FAMILY MEDICINE

## 2022-03-02 PROCEDURE — 1160F PR REVIEW ALL MEDS BY PRESCRIBER/CLIN PHARMACIST DOCUMENTED: ICD-10-PCS | Mod: CPTII,S$GLB,, | Performed by: FAMILY MEDICINE

## 2022-03-02 PROCEDURE — 3075F SYST BP GE 130 - 139MM HG: CPT | Mod: CPTII,S$GLB,, | Performed by: FAMILY MEDICINE

## 2022-03-02 RX ORDER — FAMOTIDINE 40 MG/1
40 TABLET, FILM COATED ORAL DAILY
Qty: 90 TABLET | Refills: 3 | Status: SHIPPED | OUTPATIENT
Start: 2022-03-02 | End: 2023-03-07 | Stop reason: SDUPTHER

## 2022-03-02 RX ORDER — TIZANIDINE 4 MG/1
8 TABLET ORAL 3 TIMES DAILY PRN
Qty: 180 TABLET | Refills: 2 | Status: SHIPPED | OUTPATIENT
Start: 2022-03-02 | End: 2023-07-10 | Stop reason: SDUPTHER

## 2022-03-02 RX ORDER — ONDANSETRON 4 MG/1
4 TABLET, FILM COATED ORAL EVERY 6 HOURS PRN
Qty: 20 TABLET | Refills: 0 | Status: SHIPPED | OUTPATIENT
Start: 2022-03-02 | End: 2022-05-02

## 2022-03-02 RX ORDER — NAPROXEN 500 MG/1
500 TABLET ORAL 2 TIMES DAILY WITH MEALS
Qty: 180 TABLET | Refills: 3 | Status: SHIPPED | OUTPATIENT
Start: 2022-03-02 | End: 2022-07-18

## 2022-03-02 NOTE — TELEPHONE ENCOUNTER
From [employer]S workqueue. Attempted to contact patient.  No answer and voice mail has been disabled.  Sent message on patient portal to call me to schedule.

## 2022-03-02 NOTE — PROGRESS NOTES
" Patient ID: Janina Klein is a 62 y.o. female.    Chief Complaint: Otalgia (L ear), Headache (L side of head), Heartburn, and Follow-up (MRI review)      Reviewed family, medical, surgical, and social history.    No cp/sob  No change in mental status  No fever  No asymmetrical limb swelling    Objective:      /87 (BP Location: Left arm, Patient Position: Sitting, BP Method: Medium (Manual))   Pulse 74   Resp 16   Ht 5' 4" (1.626 m)   Wt 64.7 kg (142 lb 9.6 oz)   SpO2 98%   BMI 24.48 kg/m²   RRR, CTAB, s/nt/nd, no c/c/e, non-toxic appearing, no focal weakness  Assessment:       1. Masseter muscle spasm    2. Nausea    3. Osteoarthritis of spine with radiculopathy, cervical region    4. Chronic obstructive pulmonary disease, unspecified COPD type        Plan:       Masseter muscle spasm  -     tiZANidine (ZANAFLEX) 4 MG tablet; Take 2 tablets (8 mg total) by mouth 3 (three) times daily as needed.  Dispense: 180 tablet; Refill: 2  -     naproxen (NAPROSYN) 500 MG tablet; Take 1 tablet (500 mg total) by mouth 2 (two) times daily with meals.  Dispense: 180 tablet; Refill: 3    Nausea  -     ondansetron (ZOFRAN) 4 MG tablet; Take 1 tablet (4 mg total) by mouth every 6 (six) hours as needed.  Dispense: 20 tablet; Refill: 0    Osteoarthritis of spine with radiculopathy, cervical region  -     Ambulatory referral/consult to [facility]; Future; Expected date: 03/09/2022    Chronic obstructive pulmonary disease, unspecified COPD type    Other orders  -     famotidine (PEPCID) 40 MG tablet; Take 1 tablet (40 mg total) by mouth once daily.  Dispense: 90 tablet; Refill: 3            Reviewed, monitored, evaluated, and assessed chronic conditions as outlined above  Continue current medicines, any changes noted above  As shown  Labs, radiology studies, and procedures/notes from the last 3 months were reviewed.    Risks, benefits, and side effects were discussed with the patient. All questions were answered to " the fullest satisfaction of the patient, and pt verbalized understanding and agreement to treatment plan. Pt was to call with any new or worsening symptoms, or present to the ER.

## 2022-03-03 ENCOUNTER — TELEPHONE (OUTPATIENT)
Dept: SPINE | Facility: CLINIC | Age: 62
End: 2022-03-03
Payer: MEDICARE

## 2022-03-08 ENCOUNTER — TELEPHONE (OUTPATIENT)
Dept: SPINE | Facility: CLINIC | Age: 62
End: 2022-03-08
Payer: MEDICARE

## 2022-03-11 ENCOUNTER — TELEPHONE (OUTPATIENT)
Dept: PAIN MEDICINE | Facility: CLINIC | Age: 62
End: 2022-03-11
Payer: MEDICARE

## 2022-03-11 ENCOUNTER — OFFICE VISIT (OUTPATIENT)
Dept: SPINE | Facility: CLINIC | Age: 62
End: 2022-03-11
Payer: MEDICARE

## 2022-03-11 VITALS
DIASTOLIC BLOOD PRESSURE: 98 MMHG | HEIGHT: 64 IN | WEIGHT: 142 LBS | HEART RATE: 84 BPM | SYSTOLIC BLOOD PRESSURE: 167 MMHG | BODY MASS INDEX: 24.24 KG/M2

## 2022-03-11 DIAGNOSIS — M47.812 OSTEOARTHRITIS OF CERVICAL SPINE, UNSPECIFIED SPINAL OSTEOARTHRITIS COMPLICATION STATUS: Primary | ICD-10-CM

## 2022-03-11 DIAGNOSIS — M47.22 OSTEOARTHRITIS OF SPINE WITH RADICULOPATHY, CERVICAL REGION: ICD-10-CM

## 2022-03-11 DIAGNOSIS — M54.2 CERVICALGIA: Primary | ICD-10-CM

## 2022-03-11 PROCEDURE — 1160F PR REVIEW ALL MEDS BY PRESCRIBER/CLIN PHARMACIST DOCUMENTED: ICD-10-PCS | Mod: CPTII,S$GLB,, | Performed by: PHYSICAL MEDICINE & REHABILITATION

## 2022-03-11 PROCEDURE — 3077F SYST BP >= 140 MM HG: CPT | Mod: CPTII,S$GLB,, | Performed by: PHYSICAL MEDICINE & REHABILITATION

## 2022-03-11 PROCEDURE — 3008F BODY MASS INDEX DOCD: CPT | Mod: CPTII,S$GLB,, | Performed by: PHYSICAL MEDICINE & REHABILITATION

## 2022-03-11 PROCEDURE — 99204 OFFICE O/P NEW MOD 45 MIN: CPT | Mod: S$GLB,,, | Performed by: PHYSICAL MEDICINE & REHABILITATION

## 2022-03-11 PROCEDURE — 99204 PR OFFICE/OUTPT VISIT, NEW, LEVL IV, 45-59 MIN: ICD-10-PCS | Mod: S$GLB,,, | Performed by: PHYSICAL MEDICINE & REHABILITATION

## 2022-03-11 PROCEDURE — 1159F MED LIST DOCD IN RCRD: CPT | Mod: CPTII,S$GLB,, | Performed by: PHYSICAL MEDICINE & REHABILITATION

## 2022-03-11 PROCEDURE — 3008F PR BODY MASS INDEX (BMI) DOCUMENTED: ICD-10-PCS | Mod: CPTII,S$GLB,, | Performed by: PHYSICAL MEDICINE & REHABILITATION

## 2022-03-11 PROCEDURE — 3080F DIAST BP >= 90 MM HG: CPT | Mod: CPTII,S$GLB,, | Performed by: PHYSICAL MEDICINE & REHABILITATION

## 2022-03-11 PROCEDURE — 3080F PR MOST RECENT DIASTOLIC BLOOD PRESSURE >= 90 MM HG: ICD-10-PCS | Mod: CPTII,S$GLB,, | Performed by: PHYSICAL MEDICINE & REHABILITATION

## 2022-03-11 PROCEDURE — 1159F PR MEDICATION LIST DOCUMENTED IN MEDICAL RECORD: ICD-10-PCS | Mod: CPTII,S$GLB,, | Performed by: PHYSICAL MEDICINE & REHABILITATION

## 2022-03-11 PROCEDURE — 3077F PR MOST RECENT SYSTOLIC BLOOD PRESSURE >= 140 MM HG: ICD-10-PCS | Mod: CPTII,S$GLB,, | Performed by: PHYSICAL MEDICINE & REHABILITATION

## 2022-03-11 PROCEDURE — 1160F RVW MEDS BY RX/DR IN RCRD: CPT | Mod: CPTII,S$GLB,, | Performed by: PHYSICAL MEDICINE & REHABILITATION

## 2022-03-11 NOTE — TELEPHONE ENCOUNTER
----- Message from Edward Scales MD sent at 3/11/2022  2:25 PM CST -----  Please schedule for interlaminar injection C7-T1

## 2022-03-11 NOTE — PROGRESS NOTES
SUBJECTIVE:    Patient ID: Janina Klein is a 62 y.o. female.    Chief Complaint: Neck Pain    This is a 62-year-old woman who sees Dr. Tong for her primary care.  History of hypertension chronic obstructive pulmonary disease and gastroesophageal reflux disease.  Otherwise denies any chronic major medical problems.  She has had problems with her neck for years but she has never done anything about it.  Presents with complaints of left greater than right-sided posterior neck discomfort without radicular arm pain.  She was prescribed Naprosyn but is leery about taking that because of her GI history.  She was prescribed Zanaflex which helps some.  She denies any difficulty writing or walking.  No bowel or bladder dysfunction fever chills sweats or unexpected weight loss.  Pain level is 8/10.  I reviewed a MRI of the cervical spine from 12/08/2021 which is summarized below:    FINDINGS:  The cervical vertebral bodies are normal in height and alignment.  No acute fracture or subluxation.  Modic type 1 degenerative endplate signal abnormality at C4-C5.     Spinal cord is normal in course and signal intensity.  No MRI evidence for marrow edema or myelomalacia.     No significant prevertebral soft tissue swelling.  Paraspinal soft tissues are unremarkable.     C2-C3: No central spinal canal or foraminal stenosis.     C3-C4: No central spinal canal or foraminal stenosis.     C4-C5: Broad-based disc bulging with uncovertebral facet joint hypertrophy results in mild narrowing of the right neural foramen and moderate narrowing the left neural foramen.  Mild thinning of the ventral thecal sac without central spinal canal stenosis.     C5-C6: Broad-based disc bulging with uncovertebral facet joint hypertrophy results in mild central spinal canal stenosis with moderate narrowing of the right neural foramen and mild narrowing the left neural foramen.  Narrowed AP canal diameter measures 9.9 mm.     C6-C7: No central spinal canal  or foraminal stenosis.     C7-T1: Minimal broad-based disc bulging with a small left paracentral focal disc protrusion.  This results in mild thinning of the ventral thick without central spinal canal stenosis.  Neural foramen remain patent.     Impression:     1. Degenerative disc disease at C4-C5 resulting in mild to moderate neural foraminal narrowing.  2. Degenerative disc disease at C5-C6 resulting in mild central spinal canal stenosis with mild to moderate neural foraminal narrowing.  3. Degenerative disc disease at C7-T1 with a small left paracentral focal disc protrusion without central spinal canal stenosis.        Past Medical History:   Diagnosis Date    Anxiety     Arthritis     osteoarthritis    Chronic pain     Chronic pain     COPD (chronic obstructive pulmonary disease)     GERD (gastroesophageal reflux disease)     History of stomach ulcers     Hypertension     IBS (irritable bowel syndrome)     [facility] 2004    Palpitations      Social History     Socioeconomic History    Marital status:    Tobacco Use    Smoking status: Current Every Day Smoker     Packs/day: 0.50     Years: 0.00     Pack years: 0.00     Types: Cigarettes    Smokeless tobacco: Never Used   Substance and Sexual Activity    Alcohol use: No    Drug use: No    Sexual activity: Not Currently     Birth control/protection: See Surgical Hx     Social Determinants of Health     Food Insecurity: Food Insecurity Present    Worried About Running Out of Food in the Last Year: Often true    Ran Out of Food in the Last Year: Often true   Transportation Needs: No Transportation Needs    Lack of Transportation (Medical): No    Lack of Transportation (Non-Medical): No   Physical Activity: Unknown    Days of Exercise per Week: 3 days   Stress: Stress Concern Present    Feeling of Stress : Rather much   Social Connections: Unknown    Frequency of Communication with Friends and Family: More than  "three times a week    Frequency of Social Gatherings with Friends and Family: More than three times a week    Active Member of Clubs or Organizations: No    Attends Club or Organization Meetings: Never    Marital Status:    Housing Stability: Low Risk     Unable to Pay for Housing in the Last Year: No    Number of Places Lived in the Last Year: 1    Unstable Housing in the Last Year: No     Past Surgical History:   Procedure Laterality Date    APPENDECTOMY       SECTION      [place]      ? [place] in [place]    COLONOSCOPY  2015    Dr. Farfan   diverticulosis    COLONOSCOPY N/A 2020    Procedure: COLONOSCOPY;  Surgeon: Casey Farfan MD;  Location: [facility];  Service: General;  Laterality: N/A;    CYSTOSCOPY      ESOPHAGOGASTRODUODENOSCOPY N/A 2020    Procedure: ESOPHAGOGASTRODUODENOSCOPY (EGD);  Surgeon: Casey Farfan MD;  Location: [facility];  Service: General;  Laterality: N/A;  WITH BXS    ESOPHAGOGASTRODUODENOSCOPY N/A 2020    Procedure: EGD W/ DILAT (ESOPHAGOGASTRODUODENOSCOPY WITH DILATION);  Surgeon: Mike Tong MD;  Location: [facility];  Service: Endoscopy;  Laterality: N/A;    HERNIA REPAIR      HYSTERECTOMY  9/3/2014    with bso    LAPAROSCOPIC CHOLECYSTECTOMY N/A 2020    Procedure: CHOLECYSTECTOMY-LAPAROSCOPIC;  Surgeon: Casey Farfan MD;  Location: [facility];  Service: General;  Laterality: N/A;    TOTAL ABDOMINAL HYSTERECTOMY W/ BILATERAL SALPINGOOPHORECTOMY Bilateral      Family History   Problem Relation Age of Onset    Hypertension Mother     Diabetes Sister     Colon cancer Maternal Grandmother     Breast cancer Other      Vitals:    22 1409   BP: (!) 167/98   Pulse: 84   Weight: 64.4 kg (142 lb)   Height: 5' 4" (1.626 m)       Review of Systems   Constitutional: Negative for chills, diaphoresis, fatigue, fever and unexpected weight change.   HENT: Negative for trouble swallowing.    Eyes: Negative for visual " disturbance.   Respiratory: Negative for shortness of breath.    Cardiovascular: Negative for chest pain.   Gastrointestinal: Negative for abdominal pain, constipation, nausea and vomiting.   Genitourinary: Negative for difficulty urinating.   Musculoskeletal: Negative for arthralgias, back pain, gait problem, joint swelling, myalgias, neck pain and neck stiffness.   Neurological: Negative for dizziness, speech difficulty, weakness, light-headedness, numbness and headaches.          Objective:      Physical Exam  Neurological:      Mental Status: She is alert and oriented to person, place, and time.      Comments: She is awake and in no acute distress  Mild tenderness palpation posterior cervical paraspinous musculature with no external lesions or palpable masses noted  Reflexes- +1-+2 reflexes at the following:   C5-Biceps   C6-Brachioradialis   C7-Triceps   L3/4-Patellar   S1-Achilles  Anushka sign negative bilaterally  Strength testing- 5/5 strength in the following muscle groups:  C5-Elbow flexion  C6-Wrist extension  C7-Elbow extension  C8-Finger flexion  T1-Finger abduction  L2-Hip flexion  L3-Knee extension  L4-Ankle dorsiflexion  L5-Great toe extension  S1-Ankle plantar flexion                 Assessment:       1. Cervicalgia    2. Osteoarthritis of spine with radiculopathy, cervical region           Plan:     she has a nonfocal neurological examination and no historical red flags.  She has chronic neck pain on basis of cervical degenerative disc disease and facet arthropathy.  I reassured her she has no worrisome findings on her MRI.  I am going to get her scheduled for interlaminar injections at C7-T1 with consideration of medial branch blocks and radiofrequency ablation of the C4-5 and C5-6 facet joints bilaterally.  Follow-up with me after the procedure      Cervicalgia    Osteoarthritis of spine with radiculopathy, cervical region  -     Ambulatory referral/consult to [facility]

## 2022-05-12 ENCOUNTER — PATIENT MESSAGE (OUTPATIENT)
Dept: FAMILY MEDICINE | Facility: CLINIC | Age: 62
End: 2022-05-12
Payer: MEDICARE

## 2022-05-12 DIAGNOSIS — I10 ESSENTIAL HYPERTENSION: ICD-10-CM

## 2022-05-12 DIAGNOSIS — J44.9 CHRONIC OBSTRUCTIVE PULMONARY DISEASE, UNSPECIFIED COPD TYPE: ICD-10-CM

## 2022-05-13 RX ORDER — AMLODIPINE BESYLATE 5 MG/1
5 TABLET ORAL DAILY
Qty: 90 TABLET | Refills: 0 | Status: SHIPPED | OUTPATIENT
Start: 2022-05-13 | End: 2022-08-08 | Stop reason: SDUPTHER

## 2022-05-13 RX ORDER — METOPROLOL TARTRATE 50 MG/1
50 TABLET ORAL 2 TIMES DAILY
Qty: 180 TABLET | Refills: 0 | Status: SHIPPED | OUTPATIENT
Start: 2022-05-13 | End: 2022-08-08 | Stop reason: SDUPTHER

## 2022-05-13 RX ORDER — TIOTROPIUM BROMIDE AND OLODATEROL 3.124; 2.736 UG/1; UG/1
SPRAY, METERED RESPIRATORY (INHALATION)
Qty: 12 G | Refills: 0 | Status: SHIPPED | OUTPATIENT
Start: 2022-05-13 | End: 2022-10-24

## 2022-06-22 ENCOUNTER — HOSPITAL ENCOUNTER (EMERGENCY)
Facility: HOSPITAL | Age: 62
Discharge: HOME | End: 2022-06-22
Attending: EMERGENCY MEDICINE
Payer: MEDICARE

## 2022-06-22 VITALS
HEART RATE: 92 BPM | HEIGHT: 64 IN | BODY MASS INDEX: 23.9 KG/M2 | WEIGHT: 140 LBS | DIASTOLIC BLOOD PRESSURE: 110 MMHG | TEMPERATURE: 98 F | OXYGEN SATURATION: 96 % | RESPIRATION RATE: 20 BRPM | SYSTOLIC BLOOD PRESSURE: 145 MMHG

## 2022-06-22 DIAGNOSIS — R03.0 ELEVATED BLOOD PRESSURE READING: ICD-10-CM

## 2022-06-22 DIAGNOSIS — R06.02 SHORTNESS OF BREATH: ICD-10-CM

## 2022-06-22 DIAGNOSIS — R05.9 COUGH: ICD-10-CM

## 2022-06-22 DIAGNOSIS — U07.1 COVID-19: Primary | ICD-10-CM

## 2022-06-22 LAB
INFLUENZA A, MOLECULAR: NEGATIVE
INFLUENZA B, MOLECULAR: NEGATIVE
SARS-COV-2 RDRP RESP QL NAA+PROBE: POSITIVE
SPECIMEN SOURCE: NORMAL

## 2022-06-22 PROCEDURE — 93005 ELECTROCARDIOGRAM TRACING: CPT

## 2022-06-22 PROCEDURE — 71046 X-RAY EXAM CHEST 2 VIEWS: CPT | Mod: 26,,, | Performed by: RADIOLOGY

## 2022-06-22 PROCEDURE — 93010 EKG 12-LEAD: ICD-10-PCS | Mod: ,,, | Performed by: INTERNAL MEDICINE

## 2022-06-22 PROCEDURE — 93010 ELECTROCARDIOGRAM REPORT: CPT | Mod: ,,, | Performed by: INTERNAL MEDICINE

## 2022-06-22 PROCEDURE — 87502 INFLUENZA DNA AMP PROBE: CPT | Performed by: NURSE PRACTITIONER

## 2022-06-22 PROCEDURE — U0002 COVID-19 LAB TEST NON-CDC: HCPCS | Performed by: NURSE PRACTITIONER

## 2022-06-22 PROCEDURE — 99284 EMERGENCY DEPT VISIT MOD MDM: CPT | Mod: 25

## 2022-06-22 PROCEDURE — 71046 XR CHEST PA AND LATERAL: ICD-10-PCS | Mod: 26,,, | Performed by: RADIOLOGY

## 2022-06-22 PROCEDURE — 71046 X-RAY EXAM CHEST 2 VIEWS: CPT | Mod: TC,FY

## 2022-06-22 RX ORDER — ALBUTEROL SULFATE 90 UG/1
2 AEROSOL, METERED RESPIRATORY (INHALATION) EVERY 6 HOURS PRN
Qty: 8 G | Refills: 0 | Status: SHIPPED | OUTPATIENT
Start: 2022-06-22 | End: 2022-09-26

## 2022-06-22 NOTE — ED PROVIDER NOTES
Encounter Date: 2022       History     Chief Complaint   Patient presents with    Cough    Headache    Chest Congestion    Muscle Pain     Pt reports symptoms x 2 weeks.      62-year-old female presents with cough, headache, chest congestion, body aches, feelings of shortness of breath x1 week.  She denies any chest pain.  She states she does have a past medical history of COPD, hypertension, arthritis, IBS.         Review of patient's allergies indicates:   Allergen Reactions    Lisinopril      Past Medical History:   Diagnosis Date    Anxiety     Arthritis     osteoarthritis    Chronic pain     Chronic pain     COPD (chronic obstructive pulmonary disease)     GERD (gastroesophageal reflux disease)     History of stomach ulcers     Hypertension     IBS (irritable bowel syndrome)     [facility]     Palpitations      Past Surgical History:   Procedure Laterality Date    APPENDECTOMY       SECTION      [place]OS[place]      ? [place] in [place]    COLONOSCOPY  2015    Dr. Farfan   diverticulosis    COLONOSCOPY N/A 2020    Procedure: COLONOSCOPY;  Surgeon: Casey Farfan MD;  Location: [facility];  Service: General;  Laterality: N/A;    CYSTOSCOPY      ESOPHAGOGASTRODUODENOSCOPY N/A 2020    Procedure: ESOPHAGOGASTRODUODENOSCOPY (EGD);  Surgeon: Casey Farfan MD;  Location: [facility] ENDO;  Service: General;  Laterality: N/A;  WITH BXS    ESOPHAGOGASTRODUODENOSCOPY N/A 2020    Procedure: EGD W/ DILAT (ESOPHAGOGASTRODUODENOSCOPY WITH DILATION);  Surgeon: Mike Tong MD;  Location: [facility];  Service: Endoscopy;  Laterality: N/A;    HERNIA REPAIR      HYSTERECTOMY  9/3/2014    with bso    LAPAROSCOPIC CHOLECYSTECTOMY N/A 2020    Procedure: CHOLECYSTECTOMY-LAPAROSCOPIC;  Surgeon: Casey Fafran MD;  Location: [facility] OR;  Service: General;  Laterality: N/A;    TOTAL ABDOMINAL HYSTERECTOMY W/ BILATERAL SALPINGOOPHORECTOMY Bilateral       Family History   Problem Relation Age of Onset    Hypertension Mother     Diabetes Sister     Colon cancer Maternal Grandmother     Breast cancer Other      Social History     Tobacco Use    Smoking status: Current Every Day Smoker     Packs/day: 0.50     Years: 0.00     Pack years: 0.00     Types: Cigarettes    Smokeless tobacco: Never Used   Substance Use Topics    Alcohol use: No    Drug use: No     Review of Systems   Constitutional: Negative for fever.   HENT: Positive for congestion, postnasal drip, sinus pressure and sore throat. Negative for tinnitus, trouble swallowing and voice change.    Respiratory: Positive for cough and shortness of breath. Negative for apnea, choking, chest tightness, wheezing and stridor.    Cardiovascular: Negative for chest pain, palpitations and leg swelling.   Gastrointestinal: Positive for nausea. Negative for abdominal pain, constipation and vomiting.   Genitourinary: Negative for dysuria.   Musculoskeletal: Positive for arthralgias and myalgias. Negative for back pain, gait problem, joint swelling, neck pain and neck stiffness.   All other systems reviewed and are negative.      Physical Exam     Initial Vitals [06/22/22 1735]   BP Pulse Resp Temp SpO2   (S) (!) 145/110 92 20 98.4 °F (36.9 °C) 96 %      MAP       --         Physical Exam    Nursing note and vitals reviewed.  Constitutional: She appears well-developed and well-nourished. She is not diaphoretic.   HENT:   Head: Normocephalic and atraumatic.   Right Ear: External ear normal.   Left Ear: External ear normal.   Nose: Nose normal.   Mouth/Throat: Oropharynx is clear and moist. No oropharyngeal exudate.   Uvula midline. No tonsillitis. Postnasal drip noted. Cerumen impaction to bilateral TMs.    Eyes: EOM are normal. Pupils are equal, round, and reactive to light. Right eye exhibits no discharge.   Neck:   Normal range of motion.  Cardiovascular: Normal rate, regular rhythm and normal heart sounds. Exam  reveals no gallop and no friction rub.    No murmur heard.  Pulmonary/Chest: Breath sounds normal. No respiratory distress. She has no wheezes. She has no rhonchi. She has no rales.   Abdominal: Abdomen is soft. She exhibits no distension.   Musculoskeletal:         General: Normal range of motion.      Cervical back: Normal range of motion.     Neurological: She is alert and oriented to person, place, and time. She has normal strength.   Skin: Skin is warm and dry. Capillary refill takes less than 2 seconds.   Psychiatric: She has a normal mood and affect.         ED Course   Procedures  Labs Reviewed   SARS-COV-2 RNA AMPLIFICATION, QUAL - Abnormal; Notable for the following components:       Result Value    SARS-CoV-2 RNA, Amplification, Qual Positive (*)     All other components within normal limits    Narrative:     Is the patient symptomatic?->Yes   INFLUENZA A & B BY MOLECULAR     EKG Readings: (Independently Interpreted)   Rate 77, NSR, No STEMI, No ST changes       Imaging Results          X-Ray Chest PA And Lateral (Final result)  Result time 06/22/22 18:46:56    Final result by Derek Staton MD (06/22/22 18:46:56)                 Impression:      Negative chest.  No significant change.      Electronically signed by: Derek Staotn MD  Date:    06/22/2022  Time:    18:46             Narrative:    EXAMINATION:  XR CHEST PA AND LATERAL    CLINICAL HISTORY:  Cough, unspecified    TECHNIQUE:  PA and lateral views of the chest were performed.    COMPARISON:  11/01/2019    FINDINGS:  The cardiomediastinal silhouette is within normal limits.  The lungs are well expanded without consolidation or pleural effusion.                                 Medications - No data to display  Medical Decision Making:   Differential Diagnosis:   Covid -19, Influenza, Bronchitis, PNA, COPD exacerbation  ED Management:  62-year-old female presents with cough, headache, chest congestion, and body aches x 7 days. She tested  positive for Covid. No adventitious sounds on lung exam. Not in any distress. Speaking in full sentences without difficulty. She does have a past medical history of COPD. She has been symptomatic for longer than 5 days to get Paxlovid.  I have referred her for the monoclonal antibodies. She was found to be hypertensive today in the emergency room. 145/110.  She reports that she has not had her blood pressure medicine in over a month.  I called [facility] and wife Ratna and they have had her prescription they are waiting for her.  She just has not picked up her prescription.  I discussed the patient that she should go  her medications.  She denies any shortness of breath, chest pain, headache, visual changes.  She does have a blood pressure cuff at home will continue to monitor there.  She has been given ER return precautions for any worsening symptoms or concerns.                      Clinical Impression:   Final diagnoses:  [R05.9] Cough  [R06.02] Shortness of breath  [U07.1] COVID-19 (Primary)  [R03.0] Elevated blood pressure reading                 Daniella Lucero NP  06/22/22 1927

## 2022-06-23 ENCOUNTER — TELEPHONE (OUTPATIENT)
Dept: EMERGENCY MEDICINE | Facility: HOSPITAL | Age: 62
End: 2022-06-23
Payer: MEDICARE

## 2022-06-23 NOTE — DISCHARGE INSTRUCTIONS
Take blood pressure medications as prescribed by your provider. Your metoprolol and norvasc are filled at [facility] by your PCP. Quarantine for 5 days. Return to the ED for any shortness of breath, chest pain, or any worsening symptoms or concerns at all.

## 2022-06-23 NOTE — TELEPHONE ENCOUNTER
Discussed with the patient that I was notified by Destin, the pharmacist at [facility], that she did not meet criteria for EUA or Paxlovid due to her symptom onset of greater than 7 days. She stated that she was feeling better today and verbalized understanding that she would not be getting the monoclonal antibody infusion.

## 2022-07-18 ENCOUNTER — OFFICE VISIT (OUTPATIENT)
Dept: FAMILY MEDICINE | Facility: CLINIC | Age: 62
End: 2022-07-18
Payer: MEDICARE

## 2022-07-18 VITALS
HEIGHT: 64 IN | DIASTOLIC BLOOD PRESSURE: 83 MMHG | WEIGHT: 134.88 LBS | SYSTOLIC BLOOD PRESSURE: 128 MMHG | HEART RATE: 77 BPM | BODY MASS INDEX: 23.03 KG/M2 | OXYGEN SATURATION: 97 % | RESPIRATION RATE: 16 BRPM

## 2022-07-18 DIAGNOSIS — I10 ESSENTIAL HYPERTENSION: ICD-10-CM

## 2022-07-18 DIAGNOSIS — Z79.899 HIGH RISK MEDICATION USE: ICD-10-CM

## 2022-07-18 DIAGNOSIS — F17.200 TOBACCO DEPENDENCE: ICD-10-CM

## 2022-07-18 DIAGNOSIS — F51.01 PRIMARY INSOMNIA: ICD-10-CM

## 2022-07-18 DIAGNOSIS — R53.83 OTHER FATIGUE: Primary | ICD-10-CM

## 2022-07-18 PROCEDURE — 99214 OFFICE O/P EST MOD 30 MIN: CPT | Mod: S$GLB,,, | Performed by: FAMILY MEDICINE

## 2022-07-18 PROCEDURE — 99999 PR PBB SHADOW E&M-EST. PATIENT-LVL IV: CPT | Mod: PBBFAC,,, | Performed by: FAMILY MEDICINE

## 2022-07-18 PROCEDURE — 3008F BODY MASS INDEX DOCD: CPT | Mod: CPTII,S$GLB,, | Performed by: FAMILY MEDICINE

## 2022-07-18 PROCEDURE — 3074F SYST BP LT 130 MM HG: CPT | Mod: CPTII,S$GLB,, | Performed by: FAMILY MEDICINE

## 2022-07-18 PROCEDURE — 3074F PR MOST RECENT SYSTOLIC BLOOD PRESSURE < 130 MM HG: ICD-10-PCS | Mod: CPTII,S$GLB,, | Performed by: FAMILY MEDICINE

## 2022-07-18 PROCEDURE — 99999 PR PBB SHADOW E&M-EST. PATIENT-LVL IV: ICD-10-PCS | Mod: PBBFAC,,, | Performed by: FAMILY MEDICINE

## 2022-07-18 PROCEDURE — 99214 PR OFFICE/OUTPT VISIT, EST, LEVL IV, 30-39 MIN: ICD-10-PCS | Mod: S$GLB,,, | Performed by: FAMILY MEDICINE

## 2022-07-18 PROCEDURE — 1160F RVW MEDS BY RX/DR IN RCRD: CPT | Mod: CPTII,S$GLB,, | Performed by: FAMILY MEDICINE

## 2022-07-18 PROCEDURE — 1160F PR REVIEW ALL MEDS BY PRESCRIBER/CLIN PHARMACIST DOCUMENTED: ICD-10-PCS | Mod: CPTII,S$GLB,, | Performed by: FAMILY MEDICINE

## 2022-07-18 PROCEDURE — 1159F PR MEDICATION LIST DOCUMENTED IN MEDICAL RECORD: ICD-10-PCS | Mod: CPTII,S$GLB,, | Performed by: FAMILY MEDICINE

## 2022-07-18 PROCEDURE — 1159F MED LIST DOCD IN RCRD: CPT | Mod: CPTII,S$GLB,, | Performed by: FAMILY MEDICINE

## 2022-07-18 PROCEDURE — 3079F PR MOST RECENT DIASTOLIC BLOOD PRESSURE 80-89 MM HG: ICD-10-PCS | Mod: CPTII,S$GLB,, | Performed by: FAMILY MEDICINE

## 2022-07-18 PROCEDURE — 3008F PR BODY MASS INDEX (BMI) DOCUMENTED: ICD-10-PCS | Mod: CPTII,S$GLB,, | Performed by: FAMILY MEDICINE

## 2022-07-18 PROCEDURE — 3079F DIAST BP 80-89 MM HG: CPT | Mod: CPTII,S$GLB,, | Performed by: FAMILY MEDICINE

## 2022-07-18 RX ORDER — ZOLPIDEM TARTRATE 5 MG/1
5 TABLET ORAL NIGHTLY PRN
Qty: 30 TABLET | Refills: 2 | Status: SHIPPED | OUTPATIENT
Start: 2022-07-18 | End: 2022-09-20 | Stop reason: SDUPTHER

## 2022-07-18 NOTE — PROGRESS NOTES
" Patient ID: Janina Klein is a 62 y.o. female.    Chief Complaint: Follow-up, Fatigue, and Generalized Body Aches      Reviewed family, medical, surgical, and social history.    No cp/sob  No change in mental status  No fever  No asymmetrical limb swelling    Objective:      /83 (BP Location: Left arm, Patient Position: Sitting, BP Method: Medium (Manual))   Pulse 77   Resp 16   Ht 5' 4" (1.626 m)   Wt 61.2 kg (134 lb 14.4 oz)   SpO2 97%   BMI 23.16 kg/m²   RRR, CTAB, s/nt/nd, no c/c/e, non-toxic appearing, no focal weakness  Assessment:       1. Other fatigue    2. Essential hypertension    3. High risk medication use    4. Primary insomnia    5. Tobacco dependence        Plan:       Other fatigue  -     Vitamin D; Future; Expected date: 07/18/2022  -     Vitamin B12; Future; Expected date: 07/18/2022  -     CBC Auto Differential; Future; Expected date: 07/18/2022  -     Comprehensive Metabolic Panel; Future; Expected date: 07/18/2022  -     Hemoglobin A1C; Future; Expected date: 07/18/2022  -     Lipid Panel; Future; Expected date: 07/18/2022  -     TSH; Future; Expected date: 07/18/2022  -     T4, Free; Future; Expected date: 07/18/2022  -     Estradiol; Future; Expected date: 07/18/2022  -     Magnesium; Future; Expected date: 07/18/2022    Essential hypertension  -     Vitamin D; Future; Expected date: 07/18/2022  -     Vitamin B12; Future; Expected date: 07/18/2022  -     CBC Auto Differential; Future; Expected date: 07/18/2022  -     Comprehensive Metabolic Panel; Future; Expected date: 07/18/2022  -     Hemoglobin A1C; Future; Expected date: 07/18/2022  -     Lipid Panel; Future; Expected date: 07/18/2022  -     TSH; Future; Expected date: 07/18/2022  -     T4, Free; Future; Expected date: 07/18/2022  -     Estradiol; Future; Expected date: 07/18/2022  -     Magnesium; Future; Expected date: 07/18/2022    High risk medication use  -     Vitamin D; Future; Expected date: 07/18/2022  -     Vitamin " B12; Future; Expected date: 07/18/2022  -     CBC Auto Differential; Future; Expected date: 07/18/2022  -     Comprehensive Metabolic Panel; Future; Expected date: 07/18/2022  -     Hemoglobin A1C; Future; Expected date: 07/18/2022  -     Lipid Panel; Future; Expected date: 07/18/2022  -     TSH; Future; Expected date: 07/18/2022  -     T4, Free; Future; Expected date: 07/18/2022  -     Estradiol; Future; Expected date: 07/18/2022  -     Magnesium; Future; Expected date: 07/18/2022    Primary insomnia  -     zolpidem (AMBIEN) 5 MG Tab; Take 1 tablet (5 mg total) by mouth nightly as needed.  Dispense: 30 tablet; Refill: 2  -     Magnesium; Future; Expected date: 07/18/2022    Tobacco dependence  -     Ambulatory referral/consult to Smoking Cessation Program; Future; Expected date: 07/25/2022              Continue current medicines, any changes noted above  Check labs  Start ambien   reviewed  Labs, radiology studies, and procedures/notes from the last 3 months were reviewed.    Risks, benefits, and side effects were discussed with the patient. All questions were answered to the fullest satisfaction of the patient, and pt verbalized understanding and agreement to treatment plan. Pt was to call with any new or worsening symptoms, or present to the ER.

## 2022-07-20 ENCOUNTER — LAB VISIT (OUTPATIENT)
Dept: LAB | Facility: HOSPITAL | Age: 62
End: 2022-07-20
Attending: FAMILY MEDICINE
Payer: MEDICARE

## 2022-07-20 DIAGNOSIS — F51.01 PRIMARY INSOMNIA: ICD-10-CM

## 2022-07-20 DIAGNOSIS — I10 ESSENTIAL HYPERTENSION: ICD-10-CM

## 2022-07-20 DIAGNOSIS — Z79.899 HIGH RISK MEDICATION USE: ICD-10-CM

## 2022-07-20 DIAGNOSIS — R53.83 OTHER FATIGUE: ICD-10-CM

## 2022-07-20 LAB
25(OH)D3+25(OH)D2 SERPL-MCNC: 29 NG/ML (ref 30–96)
ALBUMIN SERPL BCP-MCNC: 4.1 G/DL (ref 3.5–5.2)
ALP SERPL-CCNC: 85 U/L (ref 55–135)
ALT SERPL W/O P-5'-P-CCNC: 10 U/L (ref 10–44)
ANION GAP SERPL CALC-SCNC: 7 MMOL/L (ref 8–16)
AST SERPL-CCNC: 14 U/L (ref 10–40)
BASOPHILS # BLD AUTO: 0.07 K/UL (ref 0–0.2)
BASOPHILS NFR BLD: 0.7 % (ref 0–1.9)
BILIRUB SERPL-MCNC: 0.6 MG/DL (ref 0.1–1)
BUN SERPL-MCNC: 16 MG/DL (ref 6–20)
CALCIUM SERPL-MCNC: 10 MG/DL (ref 8.7–10.5)
CHLORIDE SERPL-SCNC: 106 MMOL/L (ref 95–110)
CHOLEST SERPL-MCNC: 188 MG/DL (ref 120–199)
CHOLEST/HDLC SERPL: 4.8 {RATIO} (ref 2–5)
CO2 SERPL-SCNC: 25 MMOL/L (ref 23–29)
CREAT SERPL-MCNC: 0.8 MG/DL (ref 0.5–1.4)
DIFFERENTIAL METHOD: ABNORMAL
EOSINOPHIL # BLD AUTO: 0.2 K/UL (ref 0–0.5)
EOSINOPHIL NFR BLD: 1.7 % (ref 0–8)
ERYTHROCYTE [DISTWIDTH] IN BLOOD BY AUTOMATED COUNT: 13.1 % (ref 11.5–14.5)
EST. GFR  (AFRICAN AMERICAN): >60 ML/MIN/1.73 M^2
EST. GFR  (NON AFRICAN AMERICAN): >60 ML/MIN/1.73 M^2
ESTIMATED AVG GLUCOSE: 100 MG/DL (ref 68–131)
ESTRADIOL SERPL-MCNC: <10 PG/ML
GLUCOSE SERPL-MCNC: 102 MG/DL (ref 70–110)
HBA1C MFR BLD: 5.1 % (ref 4–5.6)
HCT VFR BLD AUTO: 43.6 % (ref 37–48.5)
HDLC SERPL-MCNC: 39 MG/DL (ref 40–75)
HDLC SERPL: 20.7 % (ref 20–50)
HGB BLD-MCNC: 14.8 G/DL (ref 12–16)
IMM GRANULOCYTES # BLD AUTO: 0.03 K/UL (ref 0–0.04)
IMM GRANULOCYTES NFR BLD AUTO: 0.3 % (ref 0–0.5)
LDLC SERPL CALC-MCNC: 121.8 MG/DL (ref 63–159)
LYMPHOCYTES # BLD AUTO: 2.7 K/UL (ref 1–4.8)
LYMPHOCYTES NFR BLD: 25.5 % (ref 18–48)
MAGNESIUM SERPL-MCNC: 1.8 MG/DL (ref 1.6–2.6)
MCH RBC QN AUTO: 30 PG (ref 27–31)
MCHC RBC AUTO-ENTMCNC: 33.9 G/DL (ref 32–36)
MCV RBC AUTO: 88 FL (ref 82–98)
MONOCYTES # BLD AUTO: 0.5 K/UL (ref 0.3–1)
MONOCYTES NFR BLD: 4.5 % (ref 4–15)
NEUTROPHILS # BLD AUTO: 7.1 K/UL (ref 1.8–7.7)
NEUTROPHILS NFR BLD: 67.3 % (ref 38–73)
NONHDLC SERPL-MCNC: 149 MG/DL
NRBC BLD-RTO: 0 /100 WBC
PLATELET # BLD AUTO: 246 K/UL (ref 150–450)
PMV BLD AUTO: 9 FL (ref 9.2–12.9)
POTASSIUM SERPL-SCNC: 4.4 MMOL/L (ref 3.5–5.1)
PROT SERPL-MCNC: 7 G/DL (ref 6–8.4)
RBC # BLD AUTO: 4.94 M/UL (ref 4–5.4)
SODIUM SERPL-SCNC: 138 MMOL/L (ref 136–145)
T4 FREE SERPL-MCNC: 0.97 NG/DL (ref 0.71–1.51)
TRIGL SERPL-MCNC: 136 MG/DL (ref 30–150)
TSH SERPL DL<=0.005 MIU/L-ACNC: 1.44 UIU/ML (ref 0.4–4)
VIT B12 SERPL-MCNC: 378 PG/ML (ref 210–950)
WBC # BLD AUTO: 10.56 K/UL (ref 3.9–12.7)

## 2022-07-20 PROCEDURE — 83036 HEMOGLOBIN GLYCOSYLATED A1C: CPT | Performed by: FAMILY MEDICINE

## 2022-07-20 PROCEDURE — 80053 COMPREHEN METABOLIC PANEL: CPT | Performed by: FAMILY MEDICINE

## 2022-07-20 PROCEDURE — 84439 ASSAY OF FREE THYROXINE: CPT | Performed by: FAMILY MEDICINE

## 2022-07-20 PROCEDURE — 82306 VITAMIN D 25 HYDROXY: CPT | Performed by: FAMILY MEDICINE

## 2022-07-20 PROCEDURE — 82607 VITAMIN B-12: CPT | Performed by: FAMILY MEDICINE

## 2022-07-20 PROCEDURE — 85025 COMPLETE CBC W/AUTO DIFF WBC: CPT | Performed by: FAMILY MEDICINE

## 2022-07-20 PROCEDURE — 83735 ASSAY OF MAGNESIUM: CPT | Performed by: FAMILY MEDICINE

## 2022-07-20 PROCEDURE — 82670 ASSAY OF TOTAL ESTRADIOL: CPT | Performed by: FAMILY MEDICINE

## 2022-07-20 PROCEDURE — 80061 LIPID PANEL: CPT | Performed by: FAMILY MEDICINE

## 2022-07-20 PROCEDURE — 36415 COLL VENOUS BLD VENIPUNCTURE: CPT | Performed by: FAMILY MEDICINE

## 2022-07-20 PROCEDURE — 84443 ASSAY THYROID STIM HORMONE: CPT | Performed by: FAMILY MEDICINE

## 2022-07-24 ENCOUNTER — PATIENT MESSAGE (OUTPATIENT)
Dept: FAMILY MEDICINE | Facility: CLINIC | Age: 62
End: 2022-07-24
Payer: MEDICARE

## 2022-07-29 ENCOUNTER — PATIENT MESSAGE (OUTPATIENT)
Dept: FAMILY MEDICINE | Facility: CLINIC | Age: 62
End: 2022-07-29
Payer: MEDICARE

## 2022-07-29 RX ORDER — NITROFURANTOIN 25; 75 MG/1; MG/1
100 CAPSULE ORAL 2 TIMES DAILY
Qty: 10 CAPSULE | Refills: 0 | Status: SHIPPED | OUTPATIENT
Start: 2022-07-29 | End: 2022-08-03

## 2022-07-31 ENCOUNTER — HOSPITAL ENCOUNTER (EMERGENCY)
Facility: HOSPITAL | Age: 62
Discharge: HOME | End: 2022-07-31
Attending: EMERGENCY MEDICINE
Payer: MEDICARE

## 2022-07-31 VITALS
OXYGEN SATURATION: 98 % | RESPIRATION RATE: 20 BRPM | HEIGHT: 64 IN | HEART RATE: 66 BPM | SYSTOLIC BLOOD PRESSURE: 136 MMHG | WEIGHT: 130 LBS | BODY MASS INDEX: 22.2 KG/M2 | DIASTOLIC BLOOD PRESSURE: 100 MMHG | TEMPERATURE: 98 F

## 2022-07-31 DIAGNOSIS — U09.9 POST COVID-19 CONDITION, UNSPECIFIED: Primary | ICD-10-CM

## 2022-07-31 DIAGNOSIS — Z20.822 CLOSE EXPOSURE TO COVID-19 VIRUS: ICD-10-CM

## 2022-07-31 PROCEDURE — U0003 INFECTIOUS AGENT DETECTION BY NUCLEIC ACID (DNA OR RNA); SEVERE ACUTE RESPIRATORY SYNDROME CORONAVIRUS 2 (SARS-COV-2) (CORONAVIRUS DISEASE [COVID-19]), AMPLIFIED PROBE TECHNIQUE, MAKING USE OF HIGH THROUGHPUT TECHNOLOGIES AS DESCRIBED BY CMS-2020-01-R: HCPCS | Performed by: NURSE PRACTITIONER

## 2022-07-31 PROCEDURE — U0005 INFEC AGEN DETEC AMPLI PROBE: HCPCS | Performed by: NURSE PRACTITIONER

## 2022-07-31 PROCEDURE — 99282 EMERGENCY DEPT VISIT SF MDM: CPT

## 2022-07-31 NOTE — DISCHARGE INSTRUCTIONS
Rest, increase fluid intake, vitamins. Return for any worsening or new symptoms. Follow up with Primary Care Provider in the next 2-3 days.

## 2022-07-31 NOTE — ED PROVIDER NOTES
Encounter Date: 2022       History     Chief Complaint   Patient presents with    COVID-19 Concerns    Dizziness    Headache    Muscle Pain    Chills    Nausea     Patient reports covid concerns . Patients mother has covid currently .      62 years old patient presented to ER with complaints of COVID 19 symptoms, she was tested positive on 22, her mother just got tested positive 4 days ago.     The history is provided by the patient.   General Illness   The current episode started several weeks ago. The problem occurs continuously. The problem has been unchanged. The pain is at a severity of 6/10. Nothing relieves the symptoms. Nothing aggravates the symptoms. Associated symptoms include headaches and muscle aches. Pertinent negatives include no fever, no decreased vision, no abdominal pain, no nausea, no sore throat and no cough.     Review of patient's allergies indicates:   Allergen Reactions    Lisinopril      Past Medical History:   Diagnosis Date    Anxiety     Arthritis     osteoarthritis    Chronic pain     Chronic pain     COPD (chronic obstructive pulmonary disease)     GERD (gastroesophageal reflux disease)     History of stomach ulcers     Hypertension     IBS (irritable bowel syndrome)     [facility] 2004    Palpitations      Past Surgical History:   Procedure Laterality Date    APPENDECTOMY       SECTION      COLONOSCOPY      ? results in [place]    COLONOSCOPY  2015    Dr. Farfan   diverticulosis    COLONOSCOPY N/A 2020    Procedure: COLONOSCOPY;  Surgeon: Casey Farfan MD;  Location: [facility];  Service: General;  Laterality: N/A;    CYSTOSCOPY      ESOPHAGOGASTRODUODENOSCOPY N/A 2020    Procedure: ESOPHAGOGASTRODUODENOSCOPY (EGD);  Surgeon: Casey Farfan MD;  Location: [facility];  Service: General;  Laterality: N/A;  WITH BXS    ESOPHAGOGASTRODUODENOSCOPY N/A 2020    Procedure: EGD W/ DILAT  (ESOPHAGOGASTRODUODENOSCOPY WITH DILATION);  Surgeon: Mike Tong MD;  Location: [facility] ENDO;  Service: Endoscopy;  Laterality: N/A;    HERNIA REPAIR      HYSTERECTOMY  9/3/2014    with bso    LAPAROSCOPIC CHOLECYSTECTOMY N/A 5/26/2020    Procedure: CHOLECYSTECTOMY-LAPAROSCOPIC;  Surgeon: Casey Farfan MD;  Location: [facility] OR;  Service: General;  Laterality: N/A;    TOTAL ABDOMINAL HYSTERECTOMY W/ BILATERAL SALPINGOOPHORECTOMY Bilateral      Family History   Problem Relation Age of Onset    Hypertension Mother     Diabetes Sister     Colon cancer Maternal Grandmother     Breast cancer Other      Social History     Tobacco Use    Smoking status: Current Every Day Smoker     Packs/day: 0.50     Years: 0.00     Pack years: 0.00     Types: Cigarettes    Smokeless tobacco: Never Used   Substance Use Topics    Alcohol use: No    Drug use: No     Review of Systems   Constitutional: Positive for activity change, appetite change and chills. Negative for fever.   HENT: Negative for sore throat.    Respiratory: Negative for cough.    Gastrointestinal: Negative for abdominal pain and nausea.   Neurological: Positive for headaches.   All other systems reviewed and are negative.      Physical Exam     Initial Vitals [07/31/22 1119]   BP Pulse Resp Temp SpO2   (!) 136/100 66 20 98 °F (36.7 °C) 98 %      MAP       --         Physical Exam    Nursing note and vitals reviewed.  Constitutional: She appears well-developed and well-nourished. No distress.   HENT:   Head: Normocephalic and atraumatic.   Eyes: EOM are normal. Pupils are equal, round, and reactive to light.   Neck:   Normal range of motion.  Cardiovascular: Normal rate and regular rhythm.   No murmur heard.  Pulmonary/Chest: Breath sounds normal. No respiratory distress. She has no wheezes. She has no rhonchi. She has no rales. She exhibits no tenderness.   Abdominal: Abdomen is soft.   Musculoskeletal:         General: Normal range of motion.      Cervical  back: Normal range of motion.     Neurological: She is alert and oriented to person, place, and time. She has normal strength. GCS score is 15. GCS eye subscore is 4. GCS verbal subscore is 5. GCS motor subscore is 6.   Skin: Skin is warm and dry.   Psychiatric: She has a normal mood and affect.         ED Course   Procedures  Labs Reviewed   SARS-COV-2 (COVID-19) QUALITATIVE PCR          Imaging Results    None          Medications - No data to display  Medical Decision Making:   ED Management:  Posit COVDI 19 condition  Patient was advised to see her PCP, stop smoking.   PCR test for covid 19 pending  Please return for new, changing, or worsening pain.  Patient expressed understanding and agreed with treatment plan and was discharged in stable condition.                         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, unspecified (Primary)  [Z20.822] Close exposure to COVID-19 virus          ED Disposition Condition    Discharge Stable        ED Prescriptions     None        Follow-up Information     Follow up With Specialties Details Why Contact Info    Perico Tong MD Family Medicine In 2 days  [facility] [facility] [address]  [facility] [address] [phone]      [facility] Emergency Dept Emergency Medicine  If symptoms worsen [address] [facility] [address] [phone]           Yancy Beavers NP  07/31/22 8072

## 2022-08-01 ENCOUNTER — PATIENT MESSAGE (OUTPATIENT)
Dept: FAMILY MEDICINE | Facility: CLINIC | Age: 62
End: 2022-08-01
Payer: MEDICARE

## 2022-08-01 ENCOUNTER — TELEPHONE (OUTPATIENT)
Dept: EMERGENCY MEDICINE | Facility: HOSPITAL | Age: 62
End: 2022-08-01
Payer: MEDICARE

## 2022-08-01 LAB
SARS-COV-2 RNA RESP QL NAA+PROBE: DETECTED
SARS-COV-2- CYCLE NUMBER: 40

## 2022-08-02 RX ORDER — AZITHROMYCIN 250 MG/1
TABLET, FILM COATED ORAL
Qty: 6 TABLET | Refills: 0 | Status: SHIPPED | OUTPATIENT
Start: 2022-08-02 | End: 2022-08-08

## 2022-08-02 RX ORDER — PROMETHAZINE HYDROCHLORIDE AND DEXTROMETHORPHAN HYDROBROMIDE 6.25; 15 MG/5ML; MG/5ML
5 SYRUP ORAL EVERY 6 HOURS PRN
Qty: 240 ML | Refills: 1 | Status: SHIPPED | OUTPATIENT
Start: 2022-08-02 | End: 2022-08-08

## 2022-08-02 RX ORDER — PREDNISONE 20 MG/1
20 TABLET ORAL 2 TIMES DAILY
Qty: 10 TABLET | Refills: 0 | Status: SHIPPED | OUTPATIENT
Start: 2022-08-02 | End: 2022-08-08

## 2022-08-08 ENCOUNTER — OFFICE VISIT (OUTPATIENT)
Dept: FAMILY MEDICINE | Facility: CLINIC | Age: 62
End: 2022-08-08
Payer: MEDICARE

## 2022-08-08 VITALS
BODY MASS INDEX: 24.43 KG/M2 | HEIGHT: 64 IN | DIASTOLIC BLOOD PRESSURE: 66 MMHG | OXYGEN SATURATION: 96 % | HEART RATE: 65 BPM | SYSTOLIC BLOOD PRESSURE: 113 MMHG | WEIGHT: 143.13 LBS | RESPIRATION RATE: 18 BRPM

## 2022-08-08 DIAGNOSIS — M47.22 OSTEOARTHRITIS OF SPINE WITH RADICULOPATHY, CERVICAL REGION: ICD-10-CM

## 2022-08-08 DIAGNOSIS — E55.9 VITAMIN D DEFICIENCY: Primary | ICD-10-CM

## 2022-08-08 DIAGNOSIS — I10 ESSENTIAL HYPERTENSION: ICD-10-CM

## 2022-08-08 PROCEDURE — 3078F PR MOST RECENT DIASTOLIC BLOOD PRESSURE < 80 MM HG: ICD-10-PCS | Mod: CPTII,S$GLB,, | Performed by: FAMILY MEDICINE

## 2022-08-08 PROCEDURE — 99214 OFFICE O/P EST MOD 30 MIN: CPT | Mod: S$GLB,,, | Performed by: FAMILY MEDICINE

## 2022-08-08 PROCEDURE — 1160F RVW MEDS BY RX/DR IN RCRD: CPT | Mod: CPTII,S$GLB,, | Performed by: FAMILY MEDICINE

## 2022-08-08 PROCEDURE — 99214 PR OFFICE/OUTPT VISIT, EST, LEVL IV, 30-39 MIN: ICD-10-PCS | Mod: S$GLB,,, | Performed by: FAMILY MEDICINE

## 2022-08-08 PROCEDURE — 3044F PR MOST RECENT HEMOGLOBIN A1C LEVEL <7.0%: ICD-10-PCS | Mod: CPTII,S$GLB,, | Performed by: FAMILY MEDICINE

## 2022-08-08 PROCEDURE — 3074F PR MOST RECENT SYSTOLIC BLOOD PRESSURE < 130 MM HG: ICD-10-PCS | Mod: CPTII,S$GLB,, | Performed by: FAMILY MEDICINE

## 2022-08-08 PROCEDURE — 99999 PR PBB SHADOW E&M-EST. PATIENT-LVL IV: ICD-10-PCS | Mod: PBBFAC,,, | Performed by: FAMILY MEDICINE

## 2022-08-08 PROCEDURE — 1159F PR MEDICATION LIST DOCUMENTED IN MEDICAL RECORD: ICD-10-PCS | Mod: CPTII,S$GLB,, | Performed by: FAMILY MEDICINE

## 2022-08-08 PROCEDURE — 3078F DIAST BP <80 MM HG: CPT | Mod: CPTII,S$GLB,, | Performed by: FAMILY MEDICINE

## 2022-08-08 PROCEDURE — 1159F MED LIST DOCD IN RCRD: CPT | Mod: CPTII,S$GLB,, | Performed by: FAMILY MEDICINE

## 2022-08-08 PROCEDURE — 3008F BODY MASS INDEX DOCD: CPT | Mod: CPTII,S$GLB,, | Performed by: FAMILY MEDICINE

## 2022-08-08 PROCEDURE — 3074F SYST BP LT 130 MM HG: CPT | Mod: CPTII,S$GLB,, | Performed by: FAMILY MEDICINE

## 2022-08-08 PROCEDURE — 3008F PR BODY MASS INDEX (BMI) DOCUMENTED: ICD-10-PCS | Mod: CPTII,S$GLB,, | Performed by: FAMILY MEDICINE

## 2022-08-08 PROCEDURE — 3044F HG A1C LEVEL LT 7.0%: CPT | Mod: CPTII,S$GLB,, | Performed by: FAMILY MEDICINE

## 2022-08-08 PROCEDURE — 99999 PR PBB SHADOW E&M-EST. PATIENT-LVL IV: CPT | Mod: PBBFAC,,, | Performed by: FAMILY MEDICINE

## 2022-08-08 PROCEDURE — 1160F PR REVIEW ALL MEDS BY PRESCRIBER/CLIN PHARMACIST DOCUMENTED: ICD-10-PCS | Mod: CPTII,S$GLB,, | Performed by: FAMILY MEDICINE

## 2022-08-08 RX ORDER — ERGOCALCIFEROL 1.25 MG/1
50000 CAPSULE ORAL
Qty: 12 CAPSULE | Refills: 3 | Status: SHIPPED | OUTPATIENT
Start: 2022-08-08 | End: 2024-01-04 | Stop reason: SDUPTHER

## 2022-08-08 RX ORDER — METOPROLOL TARTRATE 50 MG/1
50 TABLET ORAL 2 TIMES DAILY
Qty: 180 TABLET | Refills: 3 | Status: SHIPPED | OUTPATIENT
Start: 2022-08-08 | End: 2024-01-04 | Stop reason: SDUPTHER

## 2022-08-08 RX ORDER — AMLODIPINE BESYLATE 5 MG/1
5 TABLET ORAL DAILY
Qty: 90 TABLET | Refills: 3 | Status: SHIPPED | OUTPATIENT
Start: 2022-08-08 | End: 2022-09-24

## 2022-08-08 RX ORDER — HYDROCODONE BITARTRATE AND ACETAMINOPHEN 5; 325 MG/1; MG/1
1 TABLET ORAL EVERY 8 HOURS PRN
Qty: 9 TABLET | Refills: 0 | Status: SHIPPED | OUTPATIENT
Start: 2022-08-08 | End: 2022-08-11

## 2022-08-08 NOTE — PROGRESS NOTES
" Patient ID: Janina Klein is a 62 y.o. female.    Chief Complaint: Follow-up (BW review)      Reviewed family, medical, surgical, and social history.    No cp/sob  No change in mental status  No fever  No asymmetrical limb swelling    Objective:      /66 (BP Location: Left arm, Patient Position: Sitting, BP Method: Medium (Manual))   Pulse 65   Resp 18   Ht 5' 4" (1.626 m)   Wt 64.9 kg (143 lb 1.6 oz)   SpO2 96%   BMI 24.56 kg/m²   RRR, CTAB, s/nt/nd, no c/c/e, non-toxic appearing, no focal weakness  Assessment:       1. Vitamin D deficiency    2. Essential hypertension    3. Osteoarthritis of spine with radiculopathy, cervical region        Plan:       Vitamin D deficiency  -     ergocalciferol (ERGOCALCIFEROL) 50,000 unit Cap; Take 1 capsule (50,000 Units total) by mouth every 7 days.  Dispense: 12 capsule; Refill: 3    Essential hypertension  -     amLODIPine (NORVASC) 5 MG tablet; Take 1 tablet (5 mg total) by mouth once daily.  Dispense: 90 tablet; Refill: 3  -     metoprolol tartrate (LOPRESSOR) 50 MG tablet; Take 1 tablet (50 mg total) by mouth 2 (two) times daily.  Dispense: 180 tablet; Refill: 3    Osteoarthritis of spine with radiculopathy, cervical region  -     HYDROcodone-acetaminophen (NORCO) 5-325 mg per tablet; Take 1 tablet by mouth every 8 (eight) hours as needed for Pain.  Dispense: 9 tablet; Refill: 0              Continue current medicines, any changes noted above   reviewed  Short course of pain medicine  Labs, radiology studies, and procedures/notes from the last 3 months were reviewed.    Risks, benefits, and side effects were discussed with the patient. All questions were answered to the fullest satisfaction of the patient, and pt verbalized understanding and agreement to treatment plan. Pt was to call with any new or worsening symptoms, or present to the ER.    "

## 2022-08-23 ENCOUNTER — PATIENT MESSAGE (OUTPATIENT)
Dept: FAMILY MEDICINE | Facility: CLINIC | Age: 62
End: 2022-08-23
Payer: MEDICARE

## 2022-09-19 ENCOUNTER — PATIENT MESSAGE (OUTPATIENT)
Dept: FAMILY MEDICINE | Facility: CLINIC | Age: 62
End: 2022-09-19
Payer: MEDICARE

## 2022-09-19 DIAGNOSIS — F51.01 PRIMARY INSOMNIA: ICD-10-CM

## 2022-09-20 RX ORDER — ZOLPIDEM TARTRATE 10 MG/1
10 TABLET ORAL NIGHTLY PRN
Qty: 30 TABLET | Refills: 2 | Status: SHIPPED | OUTPATIENT
Start: 2022-09-20 | End: 2022-12-08 | Stop reason: SDUPTHER

## 2022-09-22 ENCOUNTER — PATIENT MESSAGE (OUTPATIENT)
Dept: FAMILY MEDICINE | Facility: CLINIC | Age: 62
End: 2022-09-22
Payer: MEDICARE

## 2022-09-26 ENCOUNTER — OFFICE VISIT (OUTPATIENT)
Dept: FAMILY MEDICINE | Facility: CLINIC | Age: 62
End: 2022-09-26
Payer: MEDICARE

## 2022-09-26 VITALS
HEART RATE: 78 BPM | OXYGEN SATURATION: 98 % | WEIGHT: 138.13 LBS | HEIGHT: 64 IN | DIASTOLIC BLOOD PRESSURE: 85 MMHG | RESPIRATION RATE: 16 BRPM | SYSTOLIC BLOOD PRESSURE: 116 MMHG | BODY MASS INDEX: 23.58 KG/M2

## 2022-09-26 DIAGNOSIS — M47.26 OSTEOARTHRITIS OF SPINE WITH RADICULOPATHY, LUMBAR REGION: Primary | ICD-10-CM

## 2022-09-26 DIAGNOSIS — R11.0 NAUSEA: ICD-10-CM

## 2022-09-26 PROCEDURE — 99214 OFFICE O/P EST MOD 30 MIN: CPT | Mod: S$GLB,,, | Performed by: FAMILY MEDICINE

## 2022-09-26 PROCEDURE — 99999 PR PBB SHADOW E&M-EST. PATIENT-LVL IV: ICD-10-PCS | Mod: PBBFAC,,, | Performed by: FAMILY MEDICINE

## 2022-09-26 PROCEDURE — 3079F DIAST BP 80-89 MM HG: CPT | Mod: CPTII,S$GLB,, | Performed by: FAMILY MEDICINE

## 2022-09-26 PROCEDURE — 1160F RVW MEDS BY RX/DR IN RCRD: CPT | Mod: CPTII,S$GLB,, | Performed by: FAMILY MEDICINE

## 2022-09-26 PROCEDURE — 3074F PR MOST RECENT SYSTOLIC BLOOD PRESSURE < 130 MM HG: ICD-10-PCS | Mod: CPTII,S$GLB,, | Performed by: FAMILY MEDICINE

## 2022-09-26 PROCEDURE — 3044F HG A1C LEVEL LT 7.0%: CPT | Mod: CPTII,S$GLB,, | Performed by: FAMILY MEDICINE

## 2022-09-26 PROCEDURE — 3008F PR BODY MASS INDEX (BMI) DOCUMENTED: ICD-10-PCS | Mod: CPTII,S$GLB,, | Performed by: FAMILY MEDICINE

## 2022-09-26 PROCEDURE — 3079F PR MOST RECENT DIASTOLIC BLOOD PRESSURE 80-89 MM HG: ICD-10-PCS | Mod: CPTII,S$GLB,, | Performed by: FAMILY MEDICINE

## 2022-09-26 PROCEDURE — 1160F PR REVIEW ALL MEDS BY PRESCRIBER/CLIN PHARMACIST DOCUMENTED: ICD-10-PCS | Mod: CPTII,S$GLB,, | Performed by: FAMILY MEDICINE

## 2022-09-26 PROCEDURE — 1159F MED LIST DOCD IN RCRD: CPT | Mod: CPTII,S$GLB,, | Performed by: FAMILY MEDICINE

## 2022-09-26 PROCEDURE — 99999 PR PBB SHADOW E&M-EST. PATIENT-LVL IV: CPT | Mod: PBBFAC,,, | Performed by: FAMILY MEDICINE

## 2022-09-26 PROCEDURE — 1159F PR MEDICATION LIST DOCUMENTED IN MEDICAL RECORD: ICD-10-PCS | Mod: CPTII,S$GLB,, | Performed by: FAMILY MEDICINE

## 2022-09-26 PROCEDURE — 3044F PR MOST RECENT HEMOGLOBIN A1C LEVEL <7.0%: ICD-10-PCS | Mod: CPTII,S$GLB,, | Performed by: FAMILY MEDICINE

## 2022-09-26 PROCEDURE — 3008F BODY MASS INDEX DOCD: CPT | Mod: CPTII,S$GLB,, | Performed by: FAMILY MEDICINE

## 2022-09-26 PROCEDURE — 3074F SYST BP LT 130 MM HG: CPT | Mod: CPTII,S$GLB,, | Performed by: FAMILY MEDICINE

## 2022-09-26 PROCEDURE — 99214 PR OFFICE/OUTPT VISIT, EST, LEVL IV, 30-39 MIN: ICD-10-PCS | Mod: S$GLB,,, | Performed by: FAMILY MEDICINE

## 2022-09-26 RX ORDER — ONDANSETRON 4 MG/1
TABLET, FILM COATED ORAL
Qty: 20 TABLET | Refills: 0 | OUTPATIENT
Start: 2022-09-26 | End: 2022-10-31

## 2022-09-26 RX ORDER — DICYCLOMINE HYDROCHLORIDE 10 MG/1
10 CAPSULE ORAL 4 TIMES DAILY PRN
Qty: 120 CAPSULE | Refills: 3 | Status: SHIPPED | OUTPATIENT
Start: 2022-09-26 | End: 2024-01-04 | Stop reason: SDUPTHER

## 2022-09-26 RX ORDER — MELOXICAM 15 MG/1
15 TABLET ORAL DAILY
Qty: 90 TABLET | Refills: 0 | Status: SHIPPED | OUTPATIENT
Start: 2022-09-26 | End: 2022-12-25

## 2022-09-26 RX ORDER — DICYCLOMINE HYDROCHLORIDE 10 MG/1
10 CAPSULE ORAL
COMMUNITY
End: 2022-09-26 | Stop reason: SDUPTHER

## 2022-09-26 RX ORDER — PREDNISONE 20 MG/1
20 TABLET ORAL 2 TIMES DAILY
Qty: 10 TABLET | Refills: 0 | Status: SHIPPED | OUTPATIENT
Start: 2022-09-26 | End: 2022-10-01

## 2022-09-26 RX ORDER — TIZANIDINE 4 MG/1
4 TABLET ORAL EVERY 6 HOURS PRN
Qty: 30 TABLET | Refills: 0 | Status: SHIPPED | OUTPATIENT
Start: 2022-09-26 | End: 2022-10-06

## 2022-09-26 NOTE — PROGRESS NOTES
" Patient ID: Janina Klein is a 62 y.o. female.    Chief Complaint: Follow-up, Medication Refill, and Back Pain      Reviewed family, medical, surgical, and social history.    No cp/sob  No change in mental status  No fever  No asymmetrical limb swelling    Objective:      /85 (BP Location: Right arm, Patient Position: Sitting, BP Method: Medium (Manual))   Pulse 78   Resp 16   Ht 5' 4" (1.626 m)   Wt 62.6 kg (138 lb 1.6 oz)   SpO2 98%   BMI 23.70 kg/m²   RRR, CTAB, s/nt/nd, no c/c/e, non-toxic appearing, no focal weakness  Assessment:       1. Osteoarthritis of spine with radiculopathy, lumbar region    2. Nausea          Plan:       Osteoarthritis of spine with radiculopathy, lumbar region  -     meloxicam (MOBIC) 15 MG tablet; Take 1 tablet (15 mg total) by mouth once daily.  Dispense: 90 tablet; Refill: 0  -     predniSONE (DELTASONE) 20 MG tablet; Take 1 tablet (20 mg total) by mouth 2 (two) times daily. for 5 days  Dispense: 10 tablet; Refill: 0  -     tiZANidine (ZANAFLEX) 4 MG tablet; Take 1 tablet (4 mg total) by mouth every 6 (six) hours as needed.  Dispense: 30 tablet; Refill: 0  -     Ambulatory referral/consult to Back & Spine Clinic; Future; Expected date: 10/03/2022    Nausea  -     ondansetron (ZOFRAN) 4 MG tablet; TAKE 1 TABLET(4 MG) BY MOUTH EVERY 6 HOURS AS NEEDED  Strength: 4 mg  Dispense: 20 tablet; Refill: 0    Other orders  -     dicyclomine (BENTYL) 10 MG capsule; Take 1 capsule (10 mg total) by mouth 4 (four) times daily as needed.  Dispense: 120 capsule; Refill: 3            Continue current medicines, any changes noted above  Meds as shown  Referral to spine clinic for evaluation  Labs, radiology studies, and procedures/notes from the last 3 months were reviewed.    Risks, benefits, and side effects were discussed with the patient. All questions were answered to the fullest satisfaction of the patient, and pt verbalized understanding and agreement to treatment plan. Pt was to " call with any new or worsening symptoms, or present to the ER.

## 2022-09-27 ENCOUNTER — PATIENT MESSAGE (OUTPATIENT)
Dept: FAMILY MEDICINE | Facility: CLINIC | Age: 62
End: 2022-09-27
Payer: MEDICARE

## 2022-09-27 ENCOUNTER — TELEPHONE (OUTPATIENT)
Dept: FAMILY MEDICINE | Facility: CLINIC | Age: 62
End: 2022-09-27
Payer: MEDICARE

## 2022-09-27 DIAGNOSIS — F41.1 GAD (GENERALIZED ANXIETY DISORDER): Primary | ICD-10-CM

## 2022-09-28 ENCOUNTER — TELEPHONE (OUTPATIENT)
Dept: FAMILY MEDICINE | Facility: CLINIC | Age: 62
End: 2022-09-28
Payer: MEDICARE

## 2022-09-28 NOTE — TELEPHONE ENCOUNTER
Second attempt to reach patient regarding back and spine referral and no answer and vm is disabled. DP

## 2022-09-29 ENCOUNTER — TELEPHONE (OUTPATIENT)
Dept: FAMILY MEDICINE | Facility: CLINIC | Age: 62
End: 2022-09-29
Payer: MEDICARE

## 2022-10-19 DIAGNOSIS — Z12.31 OTHER SCREENING MAMMOGRAM: ICD-10-CM

## 2022-10-24 ENCOUNTER — PATIENT MESSAGE (OUTPATIENT)
Dept: ADMINISTRATIVE | Facility: HOSPITAL | Age: 62
End: 2022-10-24
Payer: MEDICARE

## 2022-10-28 ENCOUNTER — PATIENT MESSAGE (OUTPATIENT)
Dept: FAMILY MEDICINE | Facility: CLINIC | Age: 62
End: 2022-10-28
Payer: MEDICARE

## 2022-11-10 ENCOUNTER — HOSPITAL ENCOUNTER (OUTPATIENT)
Dept: RADIOLOGY | Facility: HOSPITAL | Age: 62
Discharge: HOME | End: 2022-11-10
Attending: FAMILY MEDICINE
Payer: MEDICARE

## 2022-11-10 VITALS — BODY MASS INDEX: 23.34 KG/M2 | WEIGHT: 136.69 LBS | HEIGHT: 64 IN

## 2022-11-10 DIAGNOSIS — Z12.31 ENCOUNTER FOR SCREENING MAMMOGRAM FOR MALIGNANT NEOPLASM OF BREAST: ICD-10-CM

## 2022-11-10 PROCEDURE — 77063 MAMMO DIGITAL SCREENING BILAT WITH TOMO: ICD-10-PCS | Mod: 26,,, | Performed by: RADIOLOGY

## 2022-11-10 PROCEDURE — 77067 SCR MAMMO BI INCL CAD: CPT | Mod: 26,,, | Performed by: RADIOLOGY

## 2022-11-10 PROCEDURE — 77067 MAMMO DIGITAL SCREENING BILAT WITH TOMO: ICD-10-PCS | Mod: 26,,, | Performed by: RADIOLOGY

## 2022-11-10 PROCEDURE — 77063 BREAST TOMOSYNTHESIS BI: CPT | Mod: 26,,, | Performed by: RADIOLOGY

## 2022-11-10 PROCEDURE — 77063 BREAST TOMOSYNTHESIS BI: CPT | Mod: TC

## 2022-11-11 ENCOUNTER — TELEPHONE (OUTPATIENT)
Dept: FAMILY MEDICINE | Facility: CLINIC | Age: 62
End: 2022-11-11
Payer: MEDICARE

## 2022-11-11 NOTE — TELEPHONE ENCOUNTER
----- Message from Maria A Carlisle NP sent at 11/11/2022 11:02 AM CST -----  Mammogram acceptable, repeat in one year.  Maylin FNP covering for Dr Tong

## 2022-12-03 ENCOUNTER — PATIENT MESSAGE (OUTPATIENT)
Dept: FAMILY MEDICINE | Facility: CLINIC | Age: 62
End: 2022-12-03
Payer: MEDICARE

## 2022-12-03 DIAGNOSIS — F11.29 OPIOID DEPENDENCE WITH OPIOID-INDUCED DISORDER: Primary | ICD-10-CM

## 2022-12-08 ENCOUNTER — OFFICE VISIT (OUTPATIENT)
Dept: FAMILY MEDICINE | Facility: CLINIC | Age: 62
End: 2022-12-08
Payer: MEDICARE

## 2022-12-08 VITALS
OXYGEN SATURATION: 98 % | DIASTOLIC BLOOD PRESSURE: 72 MMHG | WEIGHT: 138.69 LBS | SYSTOLIC BLOOD PRESSURE: 118 MMHG | BODY MASS INDEX: 23.68 KG/M2 | RESPIRATION RATE: 19 BRPM | HEIGHT: 64 IN | HEART RATE: 63 BPM

## 2022-12-08 DIAGNOSIS — F51.01 PRIMARY INSOMNIA: ICD-10-CM

## 2022-12-08 DIAGNOSIS — I10 ESSENTIAL HYPERTENSION: Primary | ICD-10-CM

## 2022-12-08 DIAGNOSIS — Z86.69 H/O CARPAL TUNNEL SYNDROME: ICD-10-CM

## 2022-12-08 PROCEDURE — 3044F PR MOST RECENT HEMOGLOBIN A1C LEVEL <7.0%: ICD-10-PCS | Mod: CPTII,S$GLB,, | Performed by: FAMILY MEDICINE

## 2022-12-08 PROCEDURE — 99999 PR PBB SHADOW E&M-EST. PATIENT-LVL IV: CPT | Mod: PBBFAC,,, | Performed by: FAMILY MEDICINE

## 2022-12-08 PROCEDURE — 3008F PR BODY MASS INDEX (BMI) DOCUMENTED: ICD-10-PCS | Mod: CPTII,S$GLB,, | Performed by: FAMILY MEDICINE

## 2022-12-08 PROCEDURE — 3078F PR MOST RECENT DIASTOLIC BLOOD PRESSURE < 80 MM HG: ICD-10-PCS | Mod: CPTII,S$GLB,, | Performed by: FAMILY MEDICINE

## 2022-12-08 PROCEDURE — 3044F HG A1C LEVEL LT 7.0%: CPT | Mod: CPTII,S$GLB,, | Performed by: FAMILY MEDICINE

## 2022-12-08 PROCEDURE — 99999 PR PBB SHADOW E&M-EST. PATIENT-LVL IV: ICD-10-PCS | Mod: PBBFAC,,, | Performed by: FAMILY MEDICINE

## 2022-12-08 PROCEDURE — 1159F PR MEDICATION LIST DOCUMENTED IN MEDICAL RECORD: ICD-10-PCS | Mod: CPTII,S$GLB,, | Performed by: FAMILY MEDICINE

## 2022-12-08 PROCEDURE — 99214 PR OFFICE/OUTPT VISIT, EST, LEVL IV, 30-39 MIN: ICD-10-PCS | Mod: S$GLB,,, | Performed by: FAMILY MEDICINE

## 2022-12-08 PROCEDURE — 3074F PR MOST RECENT SYSTOLIC BLOOD PRESSURE < 130 MM HG: ICD-10-PCS | Mod: CPTII,S$GLB,, | Performed by: FAMILY MEDICINE

## 2022-12-08 PROCEDURE — 1160F PR REVIEW ALL MEDS BY PRESCRIBER/CLIN PHARMACIST DOCUMENTED: ICD-10-PCS | Mod: CPTII,S$GLB,, | Performed by: FAMILY MEDICINE

## 2022-12-08 PROCEDURE — 1160F RVW MEDS BY RX/DR IN RCRD: CPT | Mod: CPTII,S$GLB,, | Performed by: FAMILY MEDICINE

## 2022-12-08 PROCEDURE — 1159F MED LIST DOCD IN RCRD: CPT | Mod: CPTII,S$GLB,, | Performed by: FAMILY MEDICINE

## 2022-12-08 PROCEDURE — 3078F DIAST BP <80 MM HG: CPT | Mod: CPTII,S$GLB,, | Performed by: FAMILY MEDICINE

## 2022-12-08 PROCEDURE — 99214 OFFICE O/P EST MOD 30 MIN: CPT | Mod: S$GLB,,, | Performed by: FAMILY MEDICINE

## 2022-12-08 PROCEDURE — 3074F SYST BP LT 130 MM HG: CPT | Mod: CPTII,S$GLB,, | Performed by: FAMILY MEDICINE

## 2022-12-08 PROCEDURE — 3008F BODY MASS INDEX DOCD: CPT | Mod: CPTII,S$GLB,, | Performed by: FAMILY MEDICINE

## 2022-12-08 RX ORDER — GABAPENTIN 800 MG/1
800 TABLET ORAL 3 TIMES DAILY
Qty: 270 TABLET | Refills: 1 | Status: SHIPPED | OUTPATIENT
Start: 2022-12-08 | End: 2023-03-07 | Stop reason: SDUPTHER

## 2022-12-08 RX ORDER — ZOLPIDEM TARTRATE 10 MG/1
10 TABLET ORAL NIGHTLY PRN
Qty: 30 TABLET | Refills: 2 | Status: SHIPPED | OUTPATIENT
Start: 2022-12-08 | End: 2023-03-07 | Stop reason: SDUPTHER

## 2022-12-08 NOTE — PROGRESS NOTES
" Patient ID: Janina Klein is a 62 y.o. female.    Chief Complaint: Follow-up and Medication Refill      Reviewed family, medical, surgical, and social history.    No cp/sob  No change in mental status  No fever  No asymmetrical limb swelling    Objective:      /72 (BP Location: Left arm, Patient Position: Sitting, BP Method: Medium (Manual))   Pulse 63   Resp 19   Ht 5' 4" (1.626 m)   Wt 62.9 kg (138 lb 11.2 oz)   SpO2 98%   BMI 23.81 kg/m²   RRR, CTAB, s/nt/nd, no c/c/e, non-toxic appearing, no focal weakness  Assessment:       1. Essential hypertension    2. H/O carpal tunnel syndrome    3. Primary insomnia          Plan:       Essential hypertension    H/O carpal tunnel syndrome  -     gabapentin (NEURONTIN) 800 MG tablet; Take 1 tablet (800 mg total) by mouth 3 (three) times daily.  Dispense: 270 tablet; Refill: 1    Primary insomnia  -     zolpidem (AMBIEN) 10 mg Tab; Take 1 tablet (10 mg total) by mouth nightly as needed.  Dispense: 30 tablet; Refill: 2            Continue current medicines, any changes noted above   reviewed  Will refill  Labs, radiology studies, and procedures/notes from the last 3 months were reviewed.    Risks, benefits, and side effects were discussed with the patient. All questions were answered to the fullest satisfaction of the patient, and pt verbalized understanding and agreement to treatment plan. Pt was to call with any new or worsening symptoms, or present to the ER.      "

## 2023-02-09 ENCOUNTER — PATIENT MESSAGE (OUTPATIENT)
Dept: FAMILY MEDICINE | Facility: CLINIC | Age: 63
End: 2023-02-09
Payer: MEDICARE

## 2023-02-10 ENCOUNTER — PATIENT MESSAGE (OUTPATIENT)
Dept: FAMILY MEDICINE | Facility: CLINIC | Age: 63
End: 2023-02-10
Payer: MEDICARE

## 2023-02-28 ENCOUNTER — PATIENT MESSAGE (OUTPATIENT)
Dept: FAMILY MEDICINE | Facility: CLINIC | Age: 63
End: 2023-02-28
Payer: MEDICARE

## 2023-03-02 ENCOUNTER — PATIENT MESSAGE (OUTPATIENT)
Dept: FAMILY MEDICINE | Facility: CLINIC | Age: 63
End: 2023-03-02
Payer: MEDICARE

## 2023-03-02 DIAGNOSIS — Z86.69 H/O CARPAL TUNNEL SYNDROME: ICD-10-CM

## 2023-03-02 DIAGNOSIS — F51.01 PRIMARY INSOMNIA: ICD-10-CM

## 2023-03-02 DIAGNOSIS — K21.9 GASTROESOPHAGEAL REFLUX DISEASE, UNSPECIFIED WHETHER ESOPHAGITIS PRESENT: ICD-10-CM

## 2023-03-05 ENCOUNTER — PATIENT MESSAGE (OUTPATIENT)
Dept: FAMILY MEDICINE | Facility: CLINIC | Age: 63
End: 2023-03-05
Payer: MEDICARE

## 2023-03-07 RX ORDER — FAMOTIDINE 40 MG/1
40 TABLET, FILM COATED ORAL DAILY
Qty: 90 TABLET | Refills: 3 | Status: SHIPPED | OUTPATIENT
Start: 2023-03-07 | End: 2024-01-04 | Stop reason: SDUPTHER

## 2023-03-07 RX ORDER — GABAPENTIN 800 MG/1
800 TABLET ORAL 3 TIMES DAILY
Qty: 270 TABLET | Refills: 1 | Status: SHIPPED | OUTPATIENT
Start: 2023-03-07 | End: 2024-01-04 | Stop reason: SDUPTHER

## 2023-03-07 RX ORDER — SUCRALFATE 1 G/1
TABLET ORAL
Qty: 60 TABLET | Refills: 11 | Status: SHIPPED | OUTPATIENT
Start: 2023-03-07 | End: 2024-01-04 | Stop reason: SDUPTHER

## 2023-03-07 RX ORDER — ZOLPIDEM TARTRATE 10 MG/1
10 TABLET ORAL NIGHTLY PRN
Qty: 30 TABLET | Refills: 2 | Status: SHIPPED | OUTPATIENT
Start: 2023-03-07 | End: 2023-06-15 | Stop reason: SDUPTHER

## 2023-03-15 ENCOUNTER — PATIENT MESSAGE (OUTPATIENT)
Dept: FAMILY MEDICINE | Facility: CLINIC | Age: 63
End: 2023-03-15
Payer: MEDICARE

## 2023-03-29 ENCOUNTER — PATIENT MESSAGE (OUTPATIENT)
Dept: FAMILY MEDICINE | Facility: CLINIC | Age: 63
End: 2023-03-29
Payer: MEDICARE

## 2023-04-12 ENCOUNTER — OFFICE VISIT (OUTPATIENT)
Dept: FAMILY MEDICINE | Facility: CLINIC | Age: 63
End: 2023-04-12
Payer: MEDICARE

## 2023-04-12 VITALS
SYSTOLIC BLOOD PRESSURE: 129 MMHG | WEIGHT: 136.69 LBS | DIASTOLIC BLOOD PRESSURE: 78 MMHG | HEIGHT: 64 IN | RESPIRATION RATE: 15 BRPM | OXYGEN SATURATION: 98 % | BODY MASS INDEX: 23.34 KG/M2 | HEART RATE: 63 BPM

## 2023-04-12 DIAGNOSIS — K21.9 GASTROESOPHAGEAL REFLUX DISEASE, UNSPECIFIED WHETHER ESOPHAGITIS PRESENT: ICD-10-CM

## 2023-04-12 DIAGNOSIS — N32.81 OAB (OVERACTIVE BLADDER): Primary | ICD-10-CM

## 2023-04-12 DIAGNOSIS — J44.9 CHRONIC OBSTRUCTIVE PULMONARY DISEASE, UNSPECIFIED COPD TYPE: ICD-10-CM

## 2023-04-12 PROCEDURE — 3074F PR MOST RECENT SYSTOLIC BLOOD PRESSURE < 130 MM HG: ICD-10-PCS | Mod: CPTII,S$GLB,, | Performed by: FAMILY MEDICINE

## 2023-04-12 PROCEDURE — 1159F MED LIST DOCD IN RCRD: CPT | Mod: CPTII,S$GLB,, | Performed by: FAMILY MEDICINE

## 2023-04-12 PROCEDURE — 1160F RVW MEDS BY RX/DR IN RCRD: CPT | Mod: CPTII,S$GLB,, | Performed by: FAMILY MEDICINE

## 2023-04-12 PROCEDURE — 99214 OFFICE O/P EST MOD 30 MIN: CPT | Mod: S$GLB,,, | Performed by: FAMILY MEDICINE

## 2023-04-12 PROCEDURE — 99999 PR PBB SHADOW E&M-EST. PATIENT-LVL IV: CPT | Mod: PBBFAC,,, | Performed by: FAMILY MEDICINE

## 2023-04-12 PROCEDURE — 3078F DIAST BP <80 MM HG: CPT | Mod: CPTII,S$GLB,, | Performed by: FAMILY MEDICINE

## 2023-04-12 PROCEDURE — 3008F BODY MASS INDEX DOCD: CPT | Mod: CPTII,S$GLB,, | Performed by: FAMILY MEDICINE

## 2023-04-12 PROCEDURE — 99999 PR PBB SHADOW E&M-EST. PATIENT-LVL IV: ICD-10-PCS | Mod: PBBFAC,,, | Performed by: FAMILY MEDICINE

## 2023-04-12 PROCEDURE — 3008F PR BODY MASS INDEX (BMI) DOCUMENTED: ICD-10-PCS | Mod: CPTII,S$GLB,, | Performed by: FAMILY MEDICINE

## 2023-04-12 PROCEDURE — 3074F SYST BP LT 130 MM HG: CPT | Mod: CPTII,S$GLB,, | Performed by: FAMILY MEDICINE

## 2023-04-12 PROCEDURE — 99214 PR OFFICE/OUTPT VISIT, EST, LEVL IV, 30-39 MIN: ICD-10-PCS | Mod: S$GLB,,, | Performed by: FAMILY MEDICINE

## 2023-04-12 PROCEDURE — 1159F PR MEDICATION LIST DOCUMENTED IN MEDICAL RECORD: ICD-10-PCS | Mod: CPTII,S$GLB,, | Performed by: FAMILY MEDICINE

## 2023-04-12 PROCEDURE — 3078F PR MOST RECENT DIASTOLIC BLOOD PRESSURE < 80 MM HG: ICD-10-PCS | Mod: CPTII,S$GLB,, | Performed by: FAMILY MEDICINE

## 2023-04-12 PROCEDURE — 1160F PR REVIEW ALL MEDS BY PRESCRIBER/CLIN PHARMACIST DOCUMENTED: ICD-10-PCS | Mod: CPTII,S$GLB,, | Performed by: FAMILY MEDICINE

## 2023-04-12 RX ORDER — PANTOPRAZOLE SODIUM 40 MG/1
40 TABLET, DELAYED RELEASE ORAL DAILY
Qty: 90 TABLET | Refills: 3 | Status: SHIPPED | OUTPATIENT
Start: 2023-04-12 | End: 2024-01-04 | Stop reason: SDUPTHER

## 2023-04-12 NOTE — PROGRESS NOTES
" Patient ID: Janina Klein is a 63 y.o. female.    Chief Complaint: Follow-up      Reviewed family, medical, surgical, and social history.    No cp/sob  No change in mental status  No fever  No asymmetrical limb swelling    Objective:      /78 (BP Location: Right arm, Patient Position: Sitting, BP Method: Medium (Manual))   Pulse 63   Resp 15   Ht 5' 4" (1.626 m)   Wt 62 kg (136 lb 11.2 oz)   SpO2 98%   BMI 23.46 kg/m²   RRR, CTAB, s/nt/nd, no c/c/e, non-toxic appearing, no focal weakness  Assessment:       1. OAB (overactive bladder)    2. Gastroesophageal reflux disease, unspecified whether esophagitis present    3. Chronic obstructive pulmonary disease, unspecified COPD type        Plan:       OAB (overactive bladder)  -     Ambulatory referral/consult to Urology; Future; Expected date: 04/19/2023    Gastroesophageal reflux disease, unspecified whether esophagitis present  -     pantoprazole (PROTONIX) 40 MG tablet; Take 1 tablet (40 mg total) by mouth once daily.  Dispense: 90 tablet; Refill: 3    Chronic obstructive pulmonary disease, unspecified COPD type  stable              Continue current medicines, any changes noted above  See letters in chart  Labs, radiology studies, and procedures/notes from the last 3 months were reviewed.    Risks, benefits, and side effects were discussed with the patient. All questions were answered to the fullest satisfaction of the patient, and pt verbalized understanding and agreement to treatment plan. Pt was to call with any new or worsening symptoms, or present to the ER.    "

## 2023-04-12 NOTE — LETTER
April 12, 2023    Janina Klein  [address]  [place] [address]             [address]  [place] [address]  Phone: [phone]  Fax: [phone] To whom it may concern:    Ms. Klein suffers from urinary issues and has trouble providing a urine sample. As such, it would be beneficial for her to perform the oral test for drug screening purposes.    If you have any questions or concerns, please don't hesitate to call.    Sincerely,        Perico Tong MD

## 2023-04-12 NOTE — LETTER
April 12, 2023    Janina Klein  [address] [facility] [address]  [place] [address]             [address]  [place] [address]  Phone: [phone]  Fax: [phone] To whom it may concern:    Ms. Klein is having some side effects to the liquid methadone, but reports that she was able to tolerate the methadone tablets without issues.    If you have any questions or concerns, please don't hesitate to call.    Sincerely,        Perico Tong MD

## 2023-04-13 ENCOUNTER — PATIENT MESSAGE (OUTPATIENT)
Dept: FAMILY MEDICINE | Facility: CLINIC | Age: 63
End: 2023-04-13
Payer: MEDICARE

## 2023-04-24 ENCOUNTER — HOSPITAL ENCOUNTER (OUTPATIENT)
Facility: HOSPITAL | Age: 63
Discharge: HOME | End: 2023-04-26
Attending: EMERGENCY MEDICINE | Admitting: INTERNAL MEDICINE
Payer: MEDICARE

## 2023-04-24 DIAGNOSIS — R07.9 CHEST PAIN: ICD-10-CM

## 2023-04-24 DIAGNOSIS — J18.9 SEPSIS DUE TO PNEUMONIA: Primary | ICD-10-CM

## 2023-04-24 DIAGNOSIS — R41.82 AMS (ALTERED MENTAL STATUS): ICD-10-CM

## 2023-04-24 DIAGNOSIS — A41.9 SEPSIS: ICD-10-CM

## 2023-04-24 DIAGNOSIS — R50.9 FEVER, UNKNOWN ORIGIN: ICD-10-CM

## 2023-04-24 DIAGNOSIS — J44.1 CHRONIC OBSTRUCTIVE PULMONARY DISEASE WITH ACUTE EXACERBATION: ICD-10-CM

## 2023-04-24 DIAGNOSIS — A41.9 SEPSIS DUE TO PNEUMONIA: Primary | ICD-10-CM

## 2023-04-24 PROBLEM — G93.41 SEPTIC ENCEPHALOPATHY: Status: ACTIVE | Noted: 2023-04-24

## 2023-04-24 LAB
ALBUMIN SERPL BCP-MCNC: 3.7 G/DL (ref 3.5–5.2)
ALP SERPL-CCNC: 80 U/L (ref 55–135)
ALT SERPL W/O P-5'-P-CCNC: 14 U/L (ref 10–44)
ANION GAP SERPL CALC-SCNC: 9 MMOL/L (ref 8–16)
AST SERPL-CCNC: 22 U/L (ref 10–40)
BACTERIA #/AREA URNS HPF: ABNORMAL /HPF
BASOPHILS # BLD AUTO: 0.03 K/UL (ref 0–0.2)
BASOPHILS NFR BLD: 0.2 % (ref 0–1.9)
BILIRUB SERPL-MCNC: 0.6 MG/DL (ref 0.1–1)
BILIRUB UR QL STRIP: NEGATIVE
BUN SERPL-MCNC: 14 MG/DL (ref 8–23)
CALCIUM SERPL-MCNC: 9.5 MG/DL (ref 8.7–10.5)
CHLORIDE SERPL-SCNC: 102 MMOL/L (ref 95–110)
CLARITY UR: CLEAR
CO2 SERPL-SCNC: 22 MMOL/L (ref 23–29)
COLOR UR: YELLOW
CREAT SERPL-MCNC: 0.9 MG/DL (ref 0.5–1.4)
DIFFERENTIAL METHOD: ABNORMAL
EOSINOPHIL # BLD AUTO: 0 K/UL (ref 0–0.5)
EOSINOPHIL NFR BLD: 0 % (ref 0–8)
ERYTHROCYTE [DISTWIDTH] IN BLOOD BY AUTOMATED COUNT: 13 % (ref 11.5–14.5)
EST. GFR  (NO RACE VARIABLE): >60 ML/MIN/1.73 M^2
GLUCOSE SERPL-MCNC: 124 MG/DL (ref 70–110)
GLUCOSE UR QL STRIP: NEGATIVE
HCT VFR BLD AUTO: 40.6 % (ref 37–48.5)
HGB BLD-MCNC: 13.8 G/DL (ref 12–16)
HGB UR QL STRIP: ABNORMAL
IMM GRANULOCYTES # BLD AUTO: 0.09 K/UL (ref 0–0.04)
IMM GRANULOCYTES NFR BLD AUTO: 0.6 % (ref 0–0.5)
INFLUENZA A, MOLECULAR: NEGATIVE
INFLUENZA B, MOLECULAR: NEGATIVE
KETONES UR QL STRIP: NEGATIVE
LACTATE SERPL-SCNC: 1.3 MMOL/L (ref 0.5–2.2)
LEUKOCYTE ESTERASE UR QL STRIP: NEGATIVE
LYMPHOCYTES # BLD AUTO: 0.8 K/UL (ref 1–4.8)
LYMPHOCYTES NFR BLD: 4.7 % (ref 18–48)
MAGNESIUM SERPL-MCNC: 1.7 MG/DL (ref 1.6–2.6)
MCH RBC QN AUTO: 29.9 PG (ref 27–31)
MCHC RBC AUTO-ENTMCNC: 34 G/DL (ref 32–36)
MCV RBC AUTO: 88 FL (ref 82–98)
MICROSCOPIC COMMENT: ABNORMAL
MONOCYTES # BLD AUTO: 0.5 K/UL (ref 0.3–1)
MONOCYTES NFR BLD: 3.1 % (ref 4–15)
NEUTROPHILS # BLD AUTO: 14.6 K/UL (ref 1.8–7.7)
NEUTROPHILS NFR BLD: 91.4 % (ref 38–73)
NITRITE UR QL STRIP: NEGATIVE
NRBC BLD-RTO: 0 /100 WBC
PH UR STRIP: 6 [PH] (ref 5–8)
PLATELET # BLD AUTO: 201 K/UL (ref 150–450)
PMV BLD AUTO: 9.2 FL (ref 9.2–12.9)
POCT GLUCOSE: 133 MG/DL (ref 70–110)
POTASSIUM SERPL-SCNC: 3.7 MMOL/L (ref 3.5–5.1)
PROT SERPL-MCNC: 6.6 G/DL (ref 6–8.4)
PROT UR QL STRIP: NEGATIVE
RBC # BLD AUTO: 4.61 M/UL (ref 4–5.4)
RBC #/AREA URNS HPF: 10 /HPF (ref 0–4)
SARS-COV-2 RDRP RESP QL NAA+PROBE: NEGATIVE
SODIUM SERPL-SCNC: 133 MMOL/L (ref 136–145)
SP GR UR STRIP: 1.01 (ref 1–1.03)
SPECIMEN SOURCE: NORMAL
URN SPEC COLLECT METH UR: ABNORMAL
UROBILINOGEN UR STRIP-ACNC: NEGATIVE EU/DL
WBC # BLD AUTO: 15.97 K/UL (ref 3.9–12.7)
WBC #/AREA URNS HPF: 4 /HPF (ref 0–5)
YEAST URNS QL MICRO: ABNORMAL

## 2023-04-24 PROCEDURE — 93010 EKG 12-LEAD: ICD-10-PCS | Mod: ,,, | Performed by: INTERNAL MEDICINE

## 2023-04-24 PROCEDURE — 71250 CT CHEST WITHOUT CONTRAST: ICD-10-PCS | Mod: 26,,, | Performed by: RADIOLOGY

## 2023-04-24 PROCEDURE — 99223 PR INITIAL HOSPITAL CARE,LEVL III: ICD-10-PCS | Mod: AI,95,, | Performed by: INTERNAL MEDICINE

## 2023-04-24 PROCEDURE — 86803 HEPATITIS C AB TEST: CPT | Performed by: EMERGENCY MEDICINE

## 2023-04-24 PROCEDURE — 99223 1ST HOSP IP/OBS HIGH 75: CPT | Mod: AI,95,, | Performed by: INTERNAL MEDICINE

## 2023-04-24 PROCEDURE — 82962 GLUCOSE BLOOD TEST: CPT

## 2023-04-24 PROCEDURE — G0378 HOSPITAL OBSERVATION PER HR: HCPCS

## 2023-04-24 PROCEDURE — 63600175 PHARM REV CODE 636 W HCPCS: Performed by: EMERGENCY MEDICINE

## 2023-04-24 PROCEDURE — 27000221 HC OXYGEN, UP TO 24 HOURS

## 2023-04-24 PROCEDURE — 71250 CT THORAX DX C-: CPT | Mod: TC

## 2023-04-24 PROCEDURE — 81000 URINALYSIS NONAUTO W/SCOPE: CPT | Performed by: EMERGENCY MEDICINE

## 2023-04-24 PROCEDURE — 83605 ASSAY OF LACTIC ACID: CPT | Performed by: EMERGENCY MEDICINE

## 2023-04-24 PROCEDURE — 93005 ELECTROCARDIOGRAM TRACING: CPT

## 2023-04-24 PROCEDURE — 80053 COMPREHEN METABOLIC PANEL: CPT | Performed by: EMERGENCY MEDICINE

## 2023-04-24 PROCEDURE — 87502 INFLUENZA DNA AMP PROBE: CPT | Performed by: EMERGENCY MEDICINE

## 2023-04-24 PROCEDURE — U0002 COVID-19 LAB TEST NON-CDC: HCPCS | Performed by: EMERGENCY MEDICINE

## 2023-04-24 PROCEDURE — 85025 COMPLETE CBC W/AUTO DIFF WBC: CPT | Performed by: EMERGENCY MEDICINE

## 2023-04-24 PROCEDURE — 93010 ELECTROCARDIOGRAM REPORT: CPT | Mod: ,,, | Performed by: INTERNAL MEDICINE

## 2023-04-24 PROCEDURE — 96365 THER/PROPH/DIAG IV INF INIT: CPT

## 2023-04-24 PROCEDURE — 71045 X-RAY EXAM CHEST 1 VIEW: CPT | Mod: 26,,, | Performed by: RADIOLOGY

## 2023-04-24 PROCEDURE — 99285 EMERGENCY DEPT VISIT HI MDM: CPT | Mod: 25,CS

## 2023-04-24 PROCEDURE — 87389 HIV-1 AG W/HIV-1&-2 AB AG IA: CPT | Performed by: EMERGENCY MEDICINE

## 2023-04-24 PROCEDURE — 83735 ASSAY OF MAGNESIUM: CPT | Performed by: EMERGENCY MEDICINE

## 2023-04-24 PROCEDURE — 71250 CT THORAX DX C-: CPT | Mod: 26,,, | Performed by: RADIOLOGY

## 2023-04-24 PROCEDURE — 71045 XR CHEST AP PORTABLE: ICD-10-PCS | Mod: 26,,, | Performed by: RADIOLOGY

## 2023-04-24 PROCEDURE — 25000003 PHARM REV CODE 250: Performed by: EMERGENCY MEDICINE

## 2023-04-24 PROCEDURE — 87040 BLOOD CULTURE FOR BACTERIA: CPT | Performed by: EMERGENCY MEDICINE

## 2023-04-24 PROCEDURE — 71045 X-RAY EXAM CHEST 1 VIEW: CPT | Mod: TC

## 2023-04-24 RX ORDER — SODIUM CHLORIDE AND POTASSIUM CHLORIDE 150; 900 MG/100ML; MG/100ML
INJECTION, SOLUTION INTRAVENOUS CONTINUOUS
Status: DISCONTINUED | OUTPATIENT
Start: 2023-04-25 | End: 2023-04-25

## 2023-04-24 RX ORDER — ENOXAPARIN SODIUM 100 MG/ML
40 INJECTION SUBCUTANEOUS EVERY 24 HOURS
Status: DISCONTINUED | OUTPATIENT
Start: 2023-04-25 | End: 2023-04-26 | Stop reason: HOSPADM

## 2023-04-24 RX ORDER — TALC
6 POWDER (GRAM) TOPICAL NIGHTLY PRN
Status: DISCONTINUED | OUTPATIENT
Start: 2023-04-25 | End: 2023-04-26 | Stop reason: HOSPADM

## 2023-04-24 RX ORDER — CHOLECALCIFEROL (VITAMIN D3) 10 MCG
1200 TABLET ORAL DAILY
Status: DISCONTINUED | OUTPATIENT
Start: 2023-04-25 | End: 2023-04-26 | Stop reason: HOSPADM

## 2023-04-24 RX ORDER — SIMETHICONE 80 MG
1 TABLET,CHEWABLE ORAL 4 TIMES DAILY PRN
Status: DISCONTINUED | OUTPATIENT
Start: 2023-04-25 | End: 2023-04-26 | Stop reason: HOSPADM

## 2023-04-24 RX ORDER — PANTOPRAZOLE SODIUM 40 MG/1
40 TABLET, DELAYED RELEASE ORAL DAILY
Status: DISCONTINUED | OUTPATIENT
Start: 2023-04-25 | End: 2023-04-26 | Stop reason: HOSPADM

## 2023-04-24 RX ORDER — DEXTROSE 40 %
30 GEL (GRAM) ORAL
Status: DISCONTINUED | OUTPATIENT
Start: 2023-04-25 | End: 2023-04-26 | Stop reason: HOSPADM

## 2023-04-24 RX ORDER — SUCRALFATE 1 G/1
2 TABLET ORAL 2 TIMES DAILY
Status: DISCONTINUED | OUTPATIENT
Start: 2023-04-25 | End: 2023-04-26 | Stop reason: HOSPADM

## 2023-04-24 RX ORDER — HYDROCODONE BITARTRATE AND ACETAMINOPHEN 5; 325 MG/1; MG/1
1 TABLET ORAL EVERY 6 HOURS PRN
Status: DISCONTINUED | OUTPATIENT
Start: 2023-04-25 | End: 2023-04-26 | Stop reason: HOSPADM

## 2023-04-24 RX ORDER — METOPROLOL TARTRATE 25 MG/1
50 TABLET, FILM COATED ORAL 2 TIMES DAILY
Status: DISCONTINUED | OUTPATIENT
Start: 2023-04-25 | End: 2023-04-24

## 2023-04-24 RX ORDER — AMLODIPINE BESYLATE 2.5 MG/1
5 TABLET ORAL DAILY
Status: DISCONTINUED | OUTPATIENT
Start: 2023-04-25 | End: 2023-04-24

## 2023-04-24 RX ORDER — ACETAMINOPHEN 500 MG
1000 TABLET ORAL
Status: COMPLETED | OUTPATIENT
Start: 2023-04-24 | End: 2023-04-24

## 2023-04-24 RX ORDER — PROCHLORPERAZINE EDISYLATE 5 MG/ML
5 INJECTION INTRAMUSCULAR; INTRAVENOUS EVERY 6 HOURS PRN
Status: DISCONTINUED | OUTPATIENT
Start: 2023-04-25 | End: 2023-04-26 | Stop reason: HOSPADM

## 2023-04-24 RX ORDER — GUAIFENESIN/DEXTROMETHORPHAN 100-10MG/5
10 SYRUP ORAL EVERY 6 HOURS
Status: DISCONTINUED | OUTPATIENT
Start: 2023-04-24 | End: 2023-04-26 | Stop reason: HOSPADM

## 2023-04-24 RX ORDER — DEXTROSE 40 %
15 GEL (GRAM) ORAL
Status: DISCONTINUED | OUTPATIENT
Start: 2023-04-25 | End: 2023-04-26 | Stop reason: HOSPADM

## 2023-04-24 RX ORDER — ACETAMINOPHEN 325 MG/1
650 TABLET ORAL EVERY 4 HOURS PRN
Status: DISCONTINUED | OUTPATIENT
Start: 2023-04-25 | End: 2023-04-26 | Stop reason: HOSPADM

## 2023-04-24 RX ORDER — AMOXICILLIN 250 MG
1 CAPSULE ORAL 2 TIMES DAILY PRN
Status: DISCONTINUED | OUTPATIENT
Start: 2023-04-25 | End: 2023-04-26 | Stop reason: HOSPADM

## 2023-04-24 RX ORDER — IBUPROFEN 600 MG/1
600 TABLET ORAL
Status: COMPLETED | OUTPATIENT
Start: 2023-04-24 | End: 2023-04-24

## 2023-04-24 RX ORDER — POLYETHYLENE GLYCOL 3350 17 G/17G
17 POWDER, FOR SOLUTION ORAL DAILY
Status: DISCONTINUED | OUTPATIENT
Start: 2023-04-25 | End: 2023-04-26 | Stop reason: HOSPADM

## 2023-04-24 RX ORDER — IPRATROPIUM BROMIDE AND ALBUTEROL SULFATE 2.5; .5 MG/3ML; MG/3ML
3 SOLUTION RESPIRATORY (INHALATION) EVERY 8 HOURS
Status: DISCONTINUED | OUTPATIENT
Start: 2023-04-25 | End: 2023-04-26 | Stop reason: HOSPADM

## 2023-04-24 RX ORDER — GLUCAGON 1 MG
1 KIT INJECTION
Status: DISCONTINUED | OUTPATIENT
Start: 2023-04-25 | End: 2023-04-26 | Stop reason: HOSPADM

## 2023-04-24 RX ORDER — METOPROLOL TARTRATE 25 MG/1
25 TABLET, FILM COATED ORAL 2 TIMES DAILY
Status: DISCONTINUED | OUTPATIENT
Start: 2023-04-25 | End: 2023-04-25

## 2023-04-24 RX ORDER — DICYCLOMINE HYDROCHLORIDE 10 MG/1
10 CAPSULE ORAL 4 TIMES DAILY PRN
Status: DISCONTINUED | OUTPATIENT
Start: 2023-04-25 | End: 2023-04-26 | Stop reason: HOSPADM

## 2023-04-24 RX ORDER — NALOXONE HCL 0.4 MG/ML
0.02 VIAL (ML) INJECTION
Status: DISCONTINUED | OUTPATIENT
Start: 2023-04-25 | End: 2023-04-26 | Stop reason: HOSPADM

## 2023-04-24 RX ORDER — SODIUM CHLORIDE 0.9 % (FLUSH) 0.9 %
10 SYRINGE (ML) INJECTION EVERY 12 HOURS PRN
Status: DISCONTINUED | OUTPATIENT
Start: 2023-04-25 | End: 2023-04-26 | Stop reason: HOSPADM

## 2023-04-24 RX ORDER — ONDANSETRON 2 MG/ML
4 INJECTION INTRAMUSCULAR; INTRAVENOUS EVERY 8 HOURS PRN
Status: DISCONTINUED | OUTPATIENT
Start: 2023-04-25 | End: 2023-04-26 | Stop reason: HOSPADM

## 2023-04-24 RX ORDER — MAGNESIUM SULFATE 1 G/100ML
1 INJECTION INTRAVENOUS ONCE
Status: COMPLETED | OUTPATIENT
Start: 2023-04-25 | End: 2023-04-25

## 2023-04-24 RX ORDER — BENZONATATE 100 MG/1
100 CAPSULE ORAL 3 TIMES DAILY PRN
Status: DISCONTINUED | OUTPATIENT
Start: 2023-04-25 | End: 2023-04-26 | Stop reason: HOSPADM

## 2023-04-24 RX ORDER — MAG HYDROX/ALUMINUM HYD/SIMETH 200-200-20
30 SUSPENSION, ORAL (FINAL DOSE FORM) ORAL 4 TIMES DAILY PRN
Status: DISCONTINUED | OUTPATIENT
Start: 2023-04-25 | End: 2023-04-26 | Stop reason: HOSPADM

## 2023-04-24 RX ADMIN — PIPERACILLIN AND TAZOBACTAM 3.38 G: 3; .375 INJECTION, POWDER, LYOPHILIZED, FOR SOLUTION INTRAVENOUS; PARENTERAL at 09:04

## 2023-04-24 RX ADMIN — SODIUM CHLORIDE 1000 ML: 9 INJECTION, SOLUTION INTRAVENOUS at 08:04

## 2023-04-24 RX ADMIN — IBUPROFEN 600 MG: 600 TABLET ORAL at 07:04

## 2023-04-24 RX ADMIN — ACETAMINOPHEN 1000 MG: 500 TABLET ORAL at 07:04

## 2023-04-24 NOTE — Clinical Note
Diagnosis: Sepsis [912362]   Future Attending Provider: VICKY ZAVALA [[id number]]   Admitting Provider:: VICKY ZAVALA [[id number]]

## 2023-04-25 ENCOUNTER — PATIENT MESSAGE (OUTPATIENT)
Dept: FAMILY MEDICINE | Facility: CLINIC | Age: 63
End: 2023-04-25
Payer: MEDICARE

## 2023-04-25 PROBLEM — J18.9 SEPSIS DUE TO PNEUMONIA: Status: ACTIVE | Noted: 2023-04-24

## 2023-04-25 PROBLEM — G93.41 SEPTIC ENCEPHALOPATHY: Status: RESOLVED | Noted: 2023-04-24 | Resolved: 2023-04-25

## 2023-04-25 PROBLEM — A41.9 SEPSIS DUE TO UNDETERMINED ORGANISM: Status: RESOLVED | Noted: 2023-04-24 | Resolved: 2023-04-25

## 2023-04-25 LAB
ALBUMIN SERPL BCP-MCNC: 2.7 G/DL (ref 3.5–5.2)
ALP SERPL-CCNC: 63 U/L (ref 55–135)
ALT SERPL W/O P-5'-P-CCNC: 12 U/L (ref 10–44)
ANION GAP SERPL CALC-SCNC: 5 MMOL/L (ref 8–16)
AST SERPL-CCNC: 21 U/L (ref 10–40)
BASOPHILS # BLD AUTO: 0.04 K/UL (ref 0–0.2)
BASOPHILS NFR BLD: 0.2 % (ref 0–1.9)
BILIRUB SERPL-MCNC: 0.4 MG/DL (ref 0.1–1)
BUN SERPL-MCNC: 15 MG/DL (ref 8–23)
CALCIUM SERPL-MCNC: 7.9 MG/DL (ref 8.7–10.5)
CHLORIDE SERPL-SCNC: 107 MMOL/L (ref 95–110)
CO2 SERPL-SCNC: 23 MMOL/L (ref 23–29)
CREAT SERPL-MCNC: 0.7 MG/DL (ref 0.5–1.4)
DIFFERENTIAL METHOD: ABNORMAL
EOSINOPHIL # BLD AUTO: 0 K/UL (ref 0–0.5)
EOSINOPHIL NFR BLD: 0.1 % (ref 0–8)
ERYTHROCYTE [DISTWIDTH] IN BLOOD BY AUTOMATED COUNT: 13.1 % (ref 11.5–14.5)
EST. GFR  (NO RACE VARIABLE): >60 ML/MIN/1.73 M^2
GLUCOSE SERPL-MCNC: 103 MG/DL (ref 70–110)
HCT VFR BLD AUTO: 32.9 % (ref 37–48.5)
HCV AB SERPL QL IA: NORMAL
HGB BLD-MCNC: 11.1 G/DL (ref 12–16)
HIV 1+2 AB+HIV1 P24 AG SERPL QL IA: NORMAL
IMM GRANULOCYTES # BLD AUTO: 0.06 K/UL (ref 0–0.04)
IMM GRANULOCYTES NFR BLD AUTO: 0.4 % (ref 0–0.5)
LYMPHOCYTES # BLD AUTO: 2.3 K/UL (ref 1–4.8)
LYMPHOCYTES NFR BLD: 13.9 % (ref 18–48)
MAGNESIUM SERPL-MCNC: 2.3 MG/DL (ref 1.6–2.6)
MCH RBC QN AUTO: 30.3 PG (ref 27–31)
MCHC RBC AUTO-ENTMCNC: 33.7 G/DL (ref 32–36)
MCV RBC AUTO: 90 FL (ref 82–98)
MONOCYTES # BLD AUTO: 0.6 K/UL (ref 0.3–1)
MONOCYTES NFR BLD: 3.8 % (ref 4–15)
NEUTROPHILS # BLD AUTO: 13.4 K/UL (ref 1.8–7.7)
NEUTROPHILS NFR BLD: 81.6 % (ref 38–73)
NRBC BLD-RTO: 0 /100 WBC
PHOSPHATE SERPL-MCNC: 3.2 MG/DL (ref 2.7–4.5)
PLATELET # BLD AUTO: 162 K/UL (ref 150–450)
PMV BLD AUTO: 9.2 FL (ref 9.2–12.9)
POTASSIUM SERPL-SCNC: 4.2 MMOL/L (ref 3.5–5.1)
PROCALCITONIN SERPL IA-MCNC: 0.84 NG/ML
PROT SERPL-MCNC: 4.9 G/DL (ref 6–8.4)
RBC # BLD AUTO: 3.66 M/UL (ref 4–5.4)
SODIUM SERPL-SCNC: 135 MMOL/L (ref 136–145)
WBC # BLD AUTO: 16.45 K/UL (ref 3.9–12.7)

## 2023-04-25 PROCEDURE — G0378 HOSPITAL OBSERVATION PER HR: HCPCS

## 2023-04-25 PROCEDURE — 83735 ASSAY OF MAGNESIUM: CPT | Performed by: INTERNAL MEDICINE

## 2023-04-25 PROCEDURE — 80053 COMPREHEN METABOLIC PANEL: CPT | Performed by: INTERNAL MEDICINE

## 2023-04-25 PROCEDURE — 96366 THER/PROPH/DIAG IV INF ADDON: CPT

## 2023-04-25 PROCEDURE — 25000242 PHARM REV CODE 250 ALT 637 W/ HCPCS: Performed by: INTERNAL MEDICINE

## 2023-04-25 PROCEDURE — 25000003 PHARM REV CODE 250: Performed by: INTERNAL MEDICINE

## 2023-04-25 PROCEDURE — 94761 N-INVAS EAR/PLS OXIMETRY MLT: CPT

## 2023-04-25 PROCEDURE — 99233 SBSQ HOSP IP/OBS HIGH 50: CPT | Mod: ,,, | Performed by: STUDENT IN AN ORGANIZED HEALTH CARE EDUCATION/TRAINING PROGRAM

## 2023-04-25 PROCEDURE — 96368 THER/DIAG CONCURRENT INF: CPT

## 2023-04-25 PROCEDURE — 96365 THER/PROPH/DIAG IV INF INIT: CPT | Mod: 59

## 2023-04-25 PROCEDURE — 94640 AIRWAY INHALATION TREATMENT: CPT | Mod: XB

## 2023-04-25 PROCEDURE — 99233 PR SUBSEQUENT HOSPITAL CARE,LEVL III: ICD-10-PCS | Mod: ,,, | Performed by: STUDENT IN AN ORGANIZED HEALTH CARE EDUCATION/TRAINING PROGRAM

## 2023-04-25 PROCEDURE — 96372 THER/PROPH/DIAG INJ SC/IM: CPT | Mod: 59 | Performed by: INTERNAL MEDICINE

## 2023-04-25 PROCEDURE — 27000221 HC OXYGEN, UP TO 24 HOURS

## 2023-04-25 PROCEDURE — 84145 PROCALCITONIN (PCT): CPT | Performed by: INTERNAL MEDICINE

## 2023-04-25 PROCEDURE — 84100 ASSAY OF PHOSPHORUS: CPT | Performed by: INTERNAL MEDICINE

## 2023-04-25 PROCEDURE — 25000003 PHARM REV CODE 250: Performed by: STUDENT IN AN ORGANIZED HEALTH CARE EDUCATION/TRAINING PROGRAM

## 2023-04-25 PROCEDURE — U0005 INFEC AGEN DETEC AMPLI PROBE: HCPCS | Performed by: INTERNAL MEDICINE

## 2023-04-25 PROCEDURE — 63600175 PHARM REV CODE 636 W HCPCS: Performed by: INTERNAL MEDICINE

## 2023-04-25 PROCEDURE — 63600175 PHARM REV CODE 636 W HCPCS: Performed by: STUDENT IN AN ORGANIZED HEALTH CARE EDUCATION/TRAINING PROGRAM

## 2023-04-25 PROCEDURE — 85025 COMPLETE CBC W/AUTO DIFF WBC: CPT | Performed by: INTERNAL MEDICINE

## 2023-04-25 PROCEDURE — 87449 NOS EACH ORGANISM AG IA: CPT | Performed by: INTERNAL MEDICINE

## 2023-04-25 PROCEDURE — 96367 TX/PROPH/DG ADDL SEQ IV INF: CPT

## 2023-04-25 RX ORDER — GUAIFENESIN/DEXTROMETHORPHAN 100-10MG/5
SYRUP ORAL
Status: DISPENSED
Start: 2023-04-25 | End: 2023-04-25

## 2023-04-25 RX ORDER — PREDNISONE 10 MG/1
40 TABLET ORAL DAILY
Status: DISCONTINUED | OUTPATIENT
Start: 2023-04-25 | End: 2023-04-26 | Stop reason: HOSPADM

## 2023-04-25 RX ORDER — SODIUM CHLORIDE AND POTASSIUM CHLORIDE 150; 900 MG/100ML; MG/100ML
INJECTION, SOLUTION INTRAVENOUS
Status: DISPENSED
Start: 2023-04-25 | End: 2023-04-25

## 2023-04-25 RX ORDER — METHADONE HYDROCHLORIDE 10 MG/1
65 TABLET ORAL DAILY
Status: DISCONTINUED | OUTPATIENT
Start: 2023-04-25 | End: 2023-04-25

## 2023-04-25 RX ORDER — METHADONE HYDROCHLORIDE 10 MG/1
60 TABLET ORAL DAILY
Status: DISCONTINUED | OUTPATIENT
Start: 2023-04-25 | End: 2023-04-26 | Stop reason: HOSPADM

## 2023-04-25 RX ADMIN — GABAPENTIN 800 MG: 100 CAPSULE ORAL at 10:04

## 2023-04-25 RX ADMIN — GUAIFENESIN AND DEXTROMETHORPHAN 10 ML: 100; 10 SYRUP ORAL at 01:04

## 2023-04-25 RX ADMIN — AZITHROMYCIN MONOHYDRATE 500 MG: 500 INJECTION, POWDER, LYOPHILIZED, FOR SOLUTION INTRAVENOUS at 12:04

## 2023-04-25 RX ADMIN — PANTOPRAZOLE SODIUM 40 MG: 40 TABLET, DELAYED RELEASE ORAL at 10:04

## 2023-04-25 RX ADMIN — GUAIFENESIN AND DEXTROMETHORPHAN 10 ML: 100; 10 SYRUP ORAL at 11:04

## 2023-04-25 RX ADMIN — MAGNESIUM SULFATE IN DEXTROSE 1 G: 10 INJECTION, SOLUTION INTRAVENOUS at 01:04

## 2023-04-25 RX ADMIN — GABAPENTIN 800 MG: 100 CAPSULE ORAL at 08:04

## 2023-04-25 RX ADMIN — GABAPENTIN 800 MG: 100 CAPSULE ORAL at 04:04

## 2023-04-25 RX ADMIN — GUAIFENESIN AND DEXTROMETHORPHAN 10 ML: 100; 10 SYRUP ORAL at 06:04

## 2023-04-25 RX ADMIN — SODIUM CHLORIDE AND POTASSIUM CHLORIDE: .9; .15 SOLUTION INTRAVENOUS at 01:04

## 2023-04-25 RX ADMIN — ENOXAPARIN SODIUM 40 MG: 40 INJECTION SUBCUTANEOUS at 04:04

## 2023-04-25 RX ADMIN — IPRATROPIUM BROMIDE AND ALBUTEROL SULFATE 3 ML: .5; 3 SOLUTION RESPIRATORY (INHALATION) at 08:04

## 2023-04-25 RX ADMIN — SUCRALFATE 1 G: 1 TABLET ORAL at 09:04

## 2023-04-25 RX ADMIN — CHOLECALCIFEROL (VITAMIN D3) 10 MCG (400 UNIT) TABLET 1200 UNITS: at 10:04

## 2023-04-25 RX ADMIN — IPRATROPIUM BROMIDE AND ALBUTEROL SULFATE 3 ML: .5; 3 SOLUTION RESPIRATORY (INHALATION) at 04:04

## 2023-04-25 RX ADMIN — CEFTRIAXONE SODIUM 2 G: 2 INJECTION, POWDER, FOR SOLUTION INTRAMUSCULAR; INTRAVENOUS at 04:04

## 2023-04-25 RX ADMIN — AZITHROMYCIN MONOHYDRATE 500 MG: 500 INJECTION, POWDER, LYOPHILIZED, FOR SOLUTION INTRAVENOUS at 11:04

## 2023-04-25 RX ADMIN — PREDNISONE 40 MG: 10 TABLET ORAL at 04:04

## 2023-04-25 RX ADMIN — SODIUM CHLORIDE 1000 ML: 9 INJECTION, SOLUTION INTRAVENOUS at 12:04

## 2023-04-25 RX ADMIN — METHADONE HYDROCHLORIDE 60 MG: 10 TABLET ORAL at 01:04

## 2023-04-25 NOTE — ASSESSMENT & PLAN NOTE
Acute hypoxic respiratory failure 2/2 COPD exacerbation caused by bacterial CAP (unknown organism)  Continue IV abx, PO steroids, Scheduled breathing treatments  On RA today  Continuous pulse ox  Keep O2>88%

## 2023-04-25 NOTE — NURSING
Plan of care reviewed with pt. DAY throughout shift. Regular diet. Continue to Monitor Labs. VSS. Continuous telemetry monitor maintained. Safety maintained. Will continue to monitor. No complaints at this time.

## 2023-04-25 NOTE — SUBJECTIVE & OBJECTIVE
Interval History: Patient doing very well this am. On RA and satting well. Wbc remains elevated. Blood cultures pending. No longer having fever. CT shows multilobar pneumonia bilaterally. Patients mental status is back to normal. She states that she has been slowly feeling worse over past week and became unresponsive yesterday evening per family. CT head was completed that showed no acute findings.     Review of Systems   All other systems reviewed and are negative.  Objective:     Vital Signs (Most Recent):  Temp: 97.7 °F (36.5 °C) (04/25/23 0723)  Pulse: 72 (04/25/23 1135)  Resp: 17 (04/25/23 1314)  BP: 125/79 (04/25/23 1135)  SpO2: 97 % (04/25/23 1135) Vital Signs (24h Range):  Temp:  [97.7 °F (36.5 °C)-101.7 °F (38.7 °C)] 97.7 °F (36.5 °C)  Pulse:  [] 72  Resp:  [8-23] 17  SpO2:  [91 %-100 %] 97 %  BP: ()/() 125/79     Weight: 59.9 kg (132 lb)  Body mass index is 22.66 kg/m².    Intake/Output Summary (Last 24 hours) at 4/25/2023 1602  Last data filed at 4/25/2023 0540  Gross per 24 hour   Intake 2450 ml   Output --   Net 2450 ml      Physical Exam  Vitals reviewed  General: NAD, Well developed, Well Nourished  Head: NC/AT  Eyes: EOMI, SHAKILA  Cardiovascular: Pulses intact distally, Regular Rate and rhythm  Pulmonary: Normal Respiratory Rate, No respiratory distress  Gi: Soft, Non-tender  Extremities: Warm, No edema present  Skin: Warm, dry  Neuro: Alert, Oriented x3, No focal Deficit  Psych: Appropriate mood and affect      Significant Labs: All pertinent labs within the past 24 hours have been reviewed.    Significant Imaging: I have reviewed all pertinent imaging results/findings within the past 24 hours.

## 2023-04-25 NOTE — ASSESSMENT & PLAN NOTE
She has no memory at home of her mother calling the ambulance due to confusion  She does not even remember the ambulance ride to the ED  She has headache, but no photophobia or stiff neck to suggest meningitis  She is completely lucid now

## 2023-04-25 NOTE — ASSESSMENT & PLAN NOTE
Her UA is equivocal  CXR with no uzma consolidation or infiltrates  CTA chest ordered and pending - will follow up shortly  She does have a slightly worsened cough, but dry  ?Viral bronchitis ?Atypical bacterial bronchitis, ?Occult PNA ?Early UTI ?Other viral syndrome  Follow blood and urine CXS  Check Procal  COVID PCR now (rapid covid and flu both negative)  HIV test    This patient does not have evidence of infective focus  My overall impression is sepsis.  Source: Unknown  Antibiotics given-   Antibiotics (72h ago, onward)    Start     Stop Route Frequency Ordered    04/25/23 0430  cefTRIAXone (ROCEPHIN) 2 g in dextrose 5 % in water (D5W) 5 % 50 mL IVPB (MB+)         -- IV Every 24 hours (non-standard times) 04/24/23 2324    04/25/23 0030  azithromycin (ZITHROMAX) 500 mg in dextrose 5 % (D5W) 250 mL IVPB (Vial-Mate)         04/30 0029 IV Every 24 hours (non-standard times) 04/24/23 2324        Latest lactate reviewed-  Recent Labs   Lab 04/24/23  1956   LACTATE 1.3     Organ dysfunction indicated by Encephalopathy    Fluid challenge Not needed - patient is not hypotensive      Post- resuscitation assessment No - Post resuscitation assessment not needed       Will Not start Pressors- Levophed for MAP of 65  Source control achieved by: empiric broad spectrum ABx

## 2023-04-25 NOTE — ASSESSMENT & PLAN NOTE
This patient does have evidence of infective focus  My overall impression is sepsis.  Source: Respiratory and Unknown  Antibiotics given-   Antibiotics (72h ago, onward)    Start     Stop Route Frequency Ordered    04/25/23 0430  cefTRIAXone (ROCEPHIN) 2 g in dextrose 5 % in water (D5W) 5 % 50 mL IVPB (MB+)         -- IV Every 24 hours (non-standard times) 04/24/23 2324 04/25/23 0030  azithromycin (ZITHROMAX) 500 mg in dextrose 5 % (D5W) 250 mL IVPB (Vial-Mate)         04/30 0029 IV Every 24 hours (non-standard times) 04/24/23 2324        Latest lactate reviewed-  Recent Labs   Lab 04/24/23 1956   LACTATE 1.3     Organ dysfunction indicated by Encephalopathy    Fluid challenge Not needed - patient is not hypotensive      Post- resuscitation assessment No - Post resuscitation assessment not needed       Will Not start Pressors- Levophed for MAP of 65  Source control achieved by: empiric broad spectrum ABx    Microbiology Results (last 7 days)     Procedure Component Value Units Date/Time    Blood culture x two cultures. Draw prior to antibiotics. [[id number]] Collected: 04/24/23 2010    Order Status: Sent Specimen: Blood from Peripheral, Forearm, Right Updated: 04/25/23 1148    Blood culture x two cultures. Draw prior to antibiotics. [[id number]] Collected: 04/24/23 2025    Order Status: Sent Specimen: Blood from Peripheral, Hand, Right Updated: 04/25/23 1148    Influenza A & B by Molecular [[id number]] Collected: 04/24/23 2005    Order Status: Completed Specimen: Nasopharyngeal Swab Updated: 04/24/23 2111     Influenza A, Molecular Negative     Influenza B, Molecular Negative     Flu A & B Source Nasal Swab        Procalcitonin elevated  F/U cultures  Continue IV abx

## 2023-04-25 NOTE — ED PROVIDER NOTES
Encounter Date: 2023       History     Chief Complaint   Patient presents with    Altered Mental Status     Pt with hx of HTN, COPD and anxiety here for eval mild confusion, fever and possible UTI. Pt says she saw her doctor for possible UTI and he gave her abx but she is not better. She c/o gen weakness and fatigue. No pain anywhere. She has mild cough but says that is due to her smoking. No NVD. No HA. She says she has not felt well in about 2wks.     The history is provided by the patient.   Fever  Primary symptoms of the febrile illness include fever, fatigue, cough, dysuria, altered mental status and myalgias. Primary symptoms do not include visual change, headaches, wheezing, shortness of breath, abdominal pain, nausea, vomiting, diarrhea, arthralgias or rash.   The dysuria is not associated with discharge, hematuria, frequency, urgency, dyspareunia, vaginal pain or penile pain.   Review of patient's allergies indicates:   Allergen Reactions    Lisinopril      Past Medical History:   Diagnosis Date    Anxiety     Arthritis     osteoarthritis    Chronic pain     Chronic pain     COPD (chronic obstructive pulmonary disease)     GERD (gastroesophageal reflux disease)     History of stomach ulcers     Hypertension     IBS (irritable bowel syndrome)     [facility]     Palpitations      Past Surgical History:   Procedure Laterality Date    APPENDECTOMY       SECTION      CHOLECYSTECTOMY      COLONOSCOPY      ? results in [place]    COLONOSCOPY  2015    Dr. Farfan   diverticulosis    COLONOSCOPY N/A 2020    Procedure: COLONOSCOPY;  Surgeon: Casey Farfan MD;  Location: [facility];  Service: General;  Laterality: N/A;    CYSTOSCOPY      ESOPHAGOGASTRODUODENOSCOPY N/A 2020    Procedure: ESOPHAGOGASTRODUODENOSCOPY (EGD);  Surgeon: Casey Farfan MD;  Location: [facility];  Service: General;  Laterality: N/A;  WITH BXS    ESOPHAGOGASTRODUODENOSCOPY N/A  08/19/2020    Procedure: EGD W/ DILAT (ESOPHAGOGASTRODUODENOSCOPY WITH DILATION);  Surgeon: Mike Tong MD;  Location: [facility] ENDO;  Service: Endoscopy;  Laterality: N/A;    HERNIA REPAIR      HYSTERECTOMY  09/03/2014    with bso    LAPAROSCOPIC CHOLECYSTECTOMY N/A 05/26/2020    Procedure: CHOLECYSTECTOMY-LAPAROSCOPIC;  Surgeon: Casey Farfan MD;  Location: [facility] OR;  Service: General;  Laterality: N/A;    TONSILLECTOMY      TOTAL ABDOMINAL HYSTERECTOMY W/ BILATERAL SALPINGOOPHORECTOMY Bilateral      Family History   Problem Relation Age of Onset    Hypertension Mother     Diabetes Sister     Colon cancer Maternal Grandmother     Breast cancer Other      Social History     Tobacco Use    Smoking status: Every Day     Packs/day: 0.50     Years: 0.00     Pack years: 0.00     Types: Cigarettes    Smokeless tobacco: Never   Substance Use Topics    Alcohol use: No    Drug use: No     Review of Systems   Constitutional:  Positive for chills, fatigue and fever.   Respiratory:  Positive for cough. Negative for shortness of breath and wheezing.    Gastrointestinal:  Negative for abdominal pain, diarrhea, nausea and vomiting.   Genitourinary:  Positive for dysuria. Negative for dyspareunia, frequency, hematuria, penile pain, urgency and vaginal pain.   Musculoskeletal:  Positive for myalgias. Negative for arthralgias.   Skin:  Negative for rash.   Neurological:  Negative for headaches.   Psychiatric/Behavioral:  Positive for confusion.    All other systems reviewed and are negative.    Physical Exam     Initial Vitals [04/24/23 1907]   BP Pulse Resp Temp SpO2   132/75 (!) 114 20 (!) 101.7 °F (38.7 °C) (!) 93 %      MAP       --         Physical Exam    Nursing note and vitals reviewed.  Constitutional: She appears well-developed and well-nourished. She is not diaphoretic. No distress.   HENT:   Mouth/Throat: Oropharynx is clear and moist.   Eyes: No scleral icterus.   Neck: Neck supple.   Normal range of  motion.  Cardiovascular:  Regular rhythm, normal heart sounds and intact distal pulses.           tachy   Pulmonary/Chest: Breath sounds normal. She exhibits no tenderness.   Abdominal: Abdomen is soft. She exhibits no distension. There is no abdominal tenderness.   Musculoskeletal:         General: No tenderness or edema. Normal range of motion.      Cervical back: Normal range of motion and neck supple.     Neurological: She is alert and oriented to person, place, and time. She has normal strength. No cranial nerve deficit or sensory deficit. GCS score is 15. GCS eye subscore is 4. GCS verbal subscore is 5. GCS motor subscore is 6.   Skin: Skin is warm and dry. Capillary refill takes less than 2 seconds. No rash noted. No erythema. No pallor.   Psychiatric: She has a normal mood and affect.       ED Course   Procedures  Labs Reviewed   CBC W/ AUTO DIFFERENTIAL - Abnormal; Notable for the following components:       Result Value    WBC 15.97 (*)     Immature Granulocytes 0.6 (*)     Gran # (ANC) 14.6 (*)     Immature Grans (Abs) 0.09 (*)     Lymph # 0.8 (*)     Gran % 91.4 (*)     Lymph % 4.7 (*)     Mono % 3.1 (*)     All other components within normal limits    Narrative:     Release to patient->Immediate   COMPREHENSIVE METABOLIC PANEL - Abnormal; Notable for the following components:    Sodium 133 (*)     CO2 22 (*)     Glucose 124 (*)     All other components within normal limits    Narrative:     Release to patient->Immediate   URINALYSIS, REFLEX TO URINE CULTURE - Abnormal; Notable for the following components:    Occult Blood UA 1+ (*)     All other components within normal limits    Narrative:     Preferred Collection Type->Urine, Clean Catch  Specimen Source->Urine   URINALYSIS MICROSCOPIC - Abnormal; Notable for the following components:    RBC, UA 10 (*)     Bacteria Moderate (*)     Yeast, UA Occasional (*)     All other components within normal limits    Narrative:     Preferred Collection  Type->Urine, Clean Catch  Specimen Source->Urine   POCT GLUCOSE - Abnormal; Notable for the following components:    POCT Glucose 133 (*)     All other components within normal limits   INFLUENZA A & B BY MOLECULAR   CULTURE, BLOOD   CULTURE, BLOOD   LACTIC ACID, PLASMA    Narrative:     Release to patient->Immediate   MAGNESIUM    Narrative:     Release to patient->Immediate   SARS-COV-2 RNA AMPLIFICATION, QUAL    Narrative:     Is the patient symptomatic?->No   HIV 1 / 2 ANTIBODY   HEPATITIS C ANTIBODY     EKG Readings: (Independently Interpreted)   Rhythm: Sinus Tachycardia. Heart Rate: 102. Ectopy: No Ectopy. Conduction: Normal. ST Segments: Normal ST Segments. T Waves: Normal. Axis: Normal. Clinical Impression: Sinus Tachycardia     Imaging Results              CT Chest Without Contrast (In process)                      X-Ray Chest AP Portable (In process)                      Medications   sodium chloride 0.9% bolus 1,000 mL 1,000 mL (1,000 mLs Intravenous New Bag 4/24/23 2057)   piperacillin-tazobactam (ZOSYN) 3.375 g in dextrose 5 % in water (D5W) 5 % 50 mL IVPB (MB+) (3.375 g Intravenous New Bag 4/24/23 2113)   acetaminophen tablet 1,000 mg (1,000 mg Oral Given 4/24/23 1946)   ibuprofen tablet 600 mg (600 mg Oral Given 4/24/23 1947)     Medical Decision Making:   Differential Diagnosis:   Viral syndrome, UTI, sepsis, pneumonia  Clinical Tests:   Lab Tests: Ordered and Reviewed  Radiological Study: Ordered and Reviewed  Medical Tests: Ordered and Reviewed  ED Management:  Pt presented with fever and AMS at home. Tmax 103.7. It was 101.5 here. She was given tylenol and motrin. She was neuro intact and A&Ox4 here as well. Neg UA. Neg covid and flu. CXR without obvious infiltrate but CT ordered to look for pneumonia. She initially presented with urinary complaints but UA unremarkable. She had mild leukocytosis of 16k. Cxs and lactate were sent. Her lactate was normal. She was given 1L NS for volume depletion  and empiric zosyn. CMP and Mg unremarkable. Fever without source in elderly female. Will obs for culture f/u and empiric abx.                         Clinical Impression:   Final diagnoses:  [R41.82] AMS (altered mental status)  [A41.9] Sepsis  [R50.9] Fever, unknown origin (Primary)        ED Disposition Condition    Observation Stable                Eddie Nicholson Jr., MD  04/24/23 2571

## 2023-04-25 NOTE — SUBJECTIVE & OBJECTIVE
Past Medical History:   Diagnosis Date    Anxiety     Arthritis     osteoarthritis    Chronic pain     Chronic pain     COPD (chronic obstructive pulmonary disease)     GERD (gastroesophageal reflux disease)     History of stomach ulcers     Hypertension     IBS (irritable bowel syndrome)     [facility] 2004    Palpitations        Past Surgical History:   Procedure Laterality Date    APPENDECTOMY       SECTION      CHOLECYSTECTOMY      COLONOSCOPY      ? results in [place]    COLONOSCOPY  2015    Dr. Farfan   diverticulosis    COLONOSCOPY N/A 2020    Procedure: COLONOSCOPY;  Surgeon: Casey Farfan MD;  Location: [facility] ENDO;  Service: General;  Laterality: N/A;    CYSTOSCOPY      ESOPHAGOGASTRODUODENOSCOPY N/A 2020    Procedure: ESOPHAGOGASTRODUODENOSCOPY (EGD);  Surgeon: Casey Farfan MD;  Location: [facility] ENDO;  Service: General;  Laterality: N/A;  WITH BXS    ESOPHAGOGASTRODUODENOSCOPY N/A 2020    Procedure: EGD W/ DILAT (ESOPHAGOGASTRODUODENOSCOPY WITH DILATION);  Surgeon: Mike Tong MD;  Location: [facility] ENDO;  Service: Endoscopy;  Laterality: N/A;    HERNIA REPAIR      HYSTERECTOMY  2014    with bso    LAPAROSCOPIC CHOLECYSTECTOMY N/A 2020    Procedure: CHOLECYSTECTOMY-LAPAROSCOPIC;  Surgeon: Casey Farfan MD;  Location: [facility] OR;  Service: General;  Laterality: N/A;    TONSILLECTOMY      TOTAL ABDOMINAL HYSTERECTOMY W/ BILATERAL SALPINGOOPHORECTOMY Bilateral        Review of patient's allergies indicates:   Allergen Reactions    Lisinopril        No current facility-administered medications on file prior to encounter.     Current Outpatient Medications on File Prior to Encounter   Medication Sig    amLODIPine (NORVASC) 5 MG tablet TAKE 1 TABLET(5 MG) BY MOUTH EVERY DAY    dicyclomine (BENTYL) 10 MG capsule Take 1 capsule (10 mg total) by mouth 4 (four) times daily as needed.    ergocalciferol (ERGOCALCIFEROL) 50,000 unit Cap Take 1  capsule (50,000 Units total) by mouth every 7 days.    estradioL (ESTRACE) 0.01 % (0.1 mg/gram) vaginal cream Place 1 g vaginally once daily for 45 days, THEN 1 g Every 3 (three) days.    famotidine (PEPCID) 40 MG tablet Take 1 tablet (40 mg total) by mouth once daily.    gabapentin (NEURONTIN) 800 MG tablet Take 1 tablet (800 mg total) by mouth 3 (three) times daily.    metoprolol tartrate (LOPRESSOR) 50 MG tablet Take 1 tablet (50 mg total) by mouth 2 (two) times daily.    mupirocin (BACTROBAN) 2 % ointment Apply topically 3 (three) times daily.    ondansetron (ZOFRAN) 4 MG tablet TAKE 1 TABLET(4 MG) BY MOUTH EVERY 6 HOURS AS NEEDED    pantoprazole (PROTONIX) 40 MG tablet Take 1 tablet (40 mg total) by mouth once daily.    sucralfate (CARAFATE) 1 gram tablet TAKE 2 TABLETS(2 GRAMS) BY MOUTH EVERY DAY    tiotropium-olodateroL (STIOLTO RESPIMAT) 2.5-2.5 mcg/actuation Mist INHALE 2 PUFFS INTO THE LUNGS EVERY DAY    tiZANidine (ZANAFLEX) 4 MG tablet Take 2 tablets (8 mg total) by mouth 3 (three) times daily as needed.    zolpidem (AMBIEN) 10 mg Tab Take 1 tablet (10 mg total) by mouth nightly as needed.    [DISCONTINUED] albuterol-ipratropium 2.5mg-0.5mg/3mL (DUO-NEB) 0.5 mg-3 mg(2.5 mg base)/3 mL nebulizer solution USE 1 VIAL VIA NEBULIZER FOUR TIMES DAILY    [DISCONTINUED] atorvastatin (LIPITOR) 20 MG tablet Take 1 tablet (20 mg total) by mouth once daily.     Family History       Problem Relation (Age of Onset)    Breast cancer Other    Colon cancer Maternal Grandmother    Diabetes Sister    Hypertension Mother          Tobacco Use    Smoking status: Every Day     Packs/day: 0.50     Years: 0.00     Pack years: 0.00     Types: Cigarettes    Smokeless tobacco: Never   Substance and Sexual Activity    Alcohol use: No    Drug use: No    Sexual activity: Not Currently     Birth control/protection: Abstinence, See Surgical Hx     Review of Systems   Constitutional:  Positive for activity change, appetite change, chills,  fatigue and fever.   Respiratory:  Positive for cough.    Cardiovascular:  Negative for chest pain and leg swelling.   Gastrointestinal:  Negative for diarrhea, nausea and vomiting.   Endocrine: Positive for cold intolerance.   Genitourinary:  Positive for difficulty urinating and frequency. Negative for dysuria.   Musculoskeletal:  Positive for myalgias.   Skin:  Negative for rash.   Neurological:  Positive for weakness and headaches.   Psychiatric/Behavioral:  Positive for confusion and decreased concentration.    Objective:     Vital Signs (Most Recent):  Temp: 99.2 °F (37.3 °C) (04/24/23 2045)  Pulse: 81 (04/24/23 2203)  Resp: 12 (04/24/23 2203)  BP: 90/68 (04/24/23 2203)  SpO2: 96 % (04/24/23 2203) Vital Signs (24h Range):  Temp:  [99.2 °F (37.3 °C)-101.7 °F (38.7 °C)] 99.2 °F (37.3 °C)  Pulse:  [] 81  Resp:  [11-23] 12  SpO2:  [93 %-96 %] 96 %  BP: ()/() 90/68     Weight: 59.9 kg (132 lb)  Body mass index is 22.66 kg/m².    Physical Exam  Constitutional:       General: She is awake.      Appearance: She is normal weight. She is not ill-appearing, toxic-appearing or diaphoretic.   HENT:      Head: Normocephalic and atraumatic.   Pulmonary:      Effort: No tachypnea or bradypnea.   Genitourinary:     Comments: No cantu in place  Lymphadenopathy:      Comments: Only trace ankle edema noted   Skin:     Comments: No rashes to arms or legs to suggest cellulitis.   Neurological:      Mental Status: She is alert and oriented to person, place, and time.      GCS: GCS eye subscore is 4. GCS verbal subscore is 5. GCS motor subscore is 6.   Psychiatric:         Attention and Perception: Attention and perception normal.         Mood and Affect: Mood normal.         Cognition and Memory: Cognition normal. Memory is impaired. She exhibits impaired recent memory.           Significant Labs: All pertinent labs within the past 24 hours have been reviewed.  Recent Results (from the past 24 hour(s))   POCT  glucose    Collection Time: 04/24/23  7:03 PM   Result Value Ref Range    POCT Glucose 133 (H) 70 - 110 mg/dL   Urinalysis, Reflex to Urine Culture Urine, Clean Catch    Collection Time: 04/24/23  7:46 PM    Specimen: Urine, Clean Catch   Result Value Ref Range    Specimen UA Urine, Clean Catch     Color, UA Yellow Yellow, Straw, Yanni    Appearance, UA Clear Clear    pH, UA 6.0 5.0 - 8.0    Specific Gravity, UA 1.015 1.005 - 1.030    Protein, UA Negative Negative    Glucose, UA Negative Negative    Ketones, UA Negative Negative    Bilirubin (UA) Negative Negative    Occult Blood UA 1+ (A) Negative    Nitrite, UA Negative Negative    Urobilinogen, UA Negative Negative EU/dL    Leukocytes, UA Negative Negative   Urinalysis Microscopic    Collection Time: 04/24/23  7:46 PM   Result Value Ref Range    RBC, UA 10 (H) 0 - 4 /hpf    WBC, UA 4 0 - 5 /hpf    Bacteria Moderate (A) None-Occ /hpf    Yeast, UA Occasional (A) None    Microscopic Comment SEE COMMENT    CBC auto differential    Collection Time: 04/24/23  7:56 PM   Result Value Ref Range    WBC 15.97 (H) 3.90 - 12.70 K/uL    RBC 4.61 4.00 - 5.40 M/uL    Hemoglobin 13.8 12.0 - 16.0 g/dL    Hematocrit 40.6 37.0 - 48.5 %    MCV 88 82 - 98 fL    MCH 29.9 27.0 - 31.0 pg    MCHC 34.0 32.0 - 36.0 g/dL    RDW 13.0 11.5 - 14.5 %    Platelets 201 150 - 450 K/uL    MPV 9.2 9.2 - 12.9 fL    Immature Granulocytes 0.6 (H) 0.0 - 0.5 %    Gran # (ANC) 14.6 (H) 1.8 - 7.7 K/uL    Immature Grans (Abs) 0.09 (H) 0.00 - 0.04 K/uL    Lymph # 0.8 (L) 1.0 - 4.8 K/uL    Mono # 0.5 0.3 - 1.0 K/uL    Eos # 0.0 0.0 - 0.5 K/uL    Baso # 0.03 0.00 - 0.20 K/uL    nRBC 0 0 /100 WBC    Gran % 91.4 (H) 38.0 - 73.0 %    Lymph % 4.7 (L) 18.0 - 48.0 %    Mono % 3.1 (L) 4.0 - 15.0 %    Eosinophil % 0.0 0.0 - 8.0 %    Basophil % 0.2 0.0 - 1.9 %    Differential Method Automated    Comprehensive metabolic panel    Collection Time: 04/24/23  7:56 PM   Result Value Ref Range    Sodium 133 (L) 136 - 145  mmol/L    Potassium 3.7 3.5 - 5.1 mmol/L    Chloride 102 95 - 110 mmol/L    CO2 22 (L) 23 - 29 mmol/L    Glucose 124 (H) 70 - 110 mg/dL    BUN 14 8 - 23 mg/dL    Creatinine 0.9 0.5 - 1.4 mg/dL    Calcium 9.5 8.7 - 10.5 mg/dL    Total Protein 6.6 6.0 - 8.4 g/dL    Albumin 3.7 3.5 - 5.2 g/dL    Total Bilirubin 0.6 0.1 - 1.0 mg/dL    Alkaline Phosphatase 80 55 - 135 U/L    AST 22 10 - 40 U/L    ALT 14 10 - 44 U/L    Anion Gap 9 8 - 16 mmol/L    eGFR >60.0 >60 mL/min/1.73 m^2   Lactic acid, plasma #1    Collection Time: 04/24/23  7:56 PM   Result Value Ref Range    Lactate (Lactic Acid) 1.3 0.5 - 2.2 mmol/L   Magnesium    Collection Time: 04/24/23  7:56 PM   Result Value Ref Range    Magnesium 1.7 1.6 - 2.6 mg/dL   Influenza A & B by Molecular    Collection Time: 04/24/23  8:05 PM    Specimen: Nasopharyngeal Swab   Result Value Ref Range    Influenza A, Molecular Negative Negative    Influenza B, Molecular Negative Negative    Flu A & B Source Nasal Swab    COVID-19 Rapid Screening    Collection Time: 04/24/23  8:05 PM   Result Value Ref Range    SARS-CoV-2 RNA, Amplification, Qual Negative Negative         Significant Imaging: I have reviewed all pertinent imaging results/findings within the past 24 hours.

## 2023-04-25 NOTE — HPI
"Ms. Klein is a 64yo lady with a past medical history of anxiety, OA, chronic pain, COPD, GERD, PUD, HTN, and IBS.  She does not wear O2 at home.  She smokes 1ppd cigarettes since 16 yo.  She was recently treated for a UTI by her PCP 3 months ago, at which time she was also referred to Urology for urinary retention.    She states she has done very well since that time, rarely using her nebs for COPD.  This morning she woke up feeling well, then in early morning she developed a low grade fever and, "just felt woozy and off."  She had a little headache with a dry cough.  She decided to just rest for the morning, so took a nap until early afternoon.  When she woke up her cough was a little worse, but not productive.  At this point, she states, "my memory just goes out."  Apparently her mother found her very confused and lethargic and called 911.    The patient does not remember any of this, nor does she remember the ambulance ride to the ED.  Her memory picks up when her mother arrived to the ED room and recounted the events to her.  She tells me she had no N/V, no dysuria or flank pain, and no skin rashes.  She no longer feels confused.    In the ED her VS were /75 -> 90/68   Pulse 114 -> 81   Temp max 101.7F -> 99.2 °F (37.3 °C) (Oral)   Resp 23 -> 12   Ht 5' 4" (1.626 m)   Wt 59.9 kg (132 lb)   SpO2 93% RA -> 96% 2L NC  Breastfeeding No   BMI 22.66 kg/m².  Labs showed WBC 16, Na 133, Cr 0.9, LA 1.3, COVID/flu negative, UA: nitrite neg, LE neg, RBC 10, WBC 4, moderate bacteria.  CXR showed normal heart size, no infiltrates, interstitial prominence.    CTA chest showed  .  EKG showed sinus tachycardia rate 102, LAE, no acute ST-T changes, QTc 409.    In the ED she was treated with:  Medications   piperacillin-tazobactam (ZOSYN) 3.375 g in dextrose 5 % in water (D5W) 5 % 50 mL IVPB (MB+) (0 g Intravenous Stopped 4/24/23 2224)   acetaminophen tablet 1,000 mg (1,000 mg Oral Given 4/24/23 1946)   ibuprofen " tablet 600 mg (600 mg Oral Given 4/24/23 1947)   sodium chloride 0.9% bolus 1,000 mL 1,000 mL (0 mLs Intravenous Stopped 4/24/23 2224)

## 2023-04-25 NOTE — PLAN OF CARE
PCP follow up appointment scheduled with Dr. Gonzáles for Wednesday, May 10 at 10:40 am. Patient completed release of information for [facility]. Records were faxed to [phone]

## 2023-04-25 NOTE — ASSESSMENT & PLAN NOTE
O2 sats 93% - O2 2L NC to keep sats > 92%        [START ON 4/25/2023] acetaminophen tablet 650 mg, 650 mg, Oral, Q4H PRN    [START ON 4/25/2023] albuterol-ipratropium 2.5 mg-0.5 mg/3 mL nebulizer solution 3 mL, 3 mL, Nebulization, Q8H    [START ON 4/25/2023] benzonatate capsule 100 mg, 100 mg, Oral, TID PRN, cough    dextromethorphan-guaiFENesin  mg/5 ml liquid 10 mL, 10 mL, Oral, Q6H, x 6

## 2023-04-25 NOTE — PLAN OF CARE
[facility] Emergency Dept  Initial Discharge Assessment       Primary Care Provider: Perico Tong MD    Admission Diagnosis: Sepsis [A41.9]    Admission Date: 4/24/2023  Expected Discharge Date:   Assessment completed with patient who was alert and oriented. She lives at home with her mother, Lizzette Klein and sister Tamela Ch. Patient is independent and does not use any assistive/mobility equipment at home but does have an inhaler.She is open to home health/Inp Rehab/SNF is recommended. Her PCP is Dr. Perico Tong and pharmacy of choice is [facility] in [place]. She has [employer] Medicare and [place] Medicaid. Patient attends appointments at [facility]. She completed a release of information for information to be released to [facility]. Patient denies any needs at this time. Case management will continue to follow.  Discharge Barriers Identified: None    Payor: HU[employer] MANAGED MEDICARE / Plan: HU[employer] [facility] HMO PPO SPECIAL NEEDS / Product Type: Medicare Advantage /     Extended Emergency Contact Information  Primary Emergency Contact: Lizzette Klein  Address: [address] [place] [address] [place]  Mobile Phone: [phone]  Relation: Mother  Preferred language: English   needed? No    Discharge Plan A: Home with family  Discharge Plan B: Home with family      [id number] [place] [address] [address]  [address] [address]  Phone: [phone] Fax: [phone]    [facility] - [facility] [address] [facility]  [address] [facility] [address]  Phone: [phone] Fax: [phone]      Initial Assessment (most recent)       Adult Discharge Assessment - 04/25/23 1125          Discharge Assessment    Assessment Type Discharge Planning Assessment     Confirmed/corrected address, phone number and insurance Yes     Confirmed Demographics Correct on  Facesheet     Source of Information patient     Does patient/caregiver understand observation status Yes     Communicated AZALIA with patient/caregiver Date not available/Unable to determine     Reason For Admission Sepsis     People in Home sibling(s);parent(s)   Lives with motherLizzette and sisterTamela    Do you expect to return to your current living situation? Yes     Do you have help at home or someone to help you manage your care at home? Yes     Who are your caregiver(s) and their phone number(s)? Lives with motherLizzette [phone] and sisterTamela     Prior to hospitilization cognitive status: Unable to Assess     Current cognitive status: Alert/Oriented     Equipment Currently Used at Home none     Readmission within 30 days? No     Patient currently being followed by outpatient case management? No     Do you currently have service(s) that help you manage your care at home? No     Do you take prescription medications? Yes     Do you have prescription coverage? Yes     Coverage [employer]     Do you have any problems affording any of your prescribed medications? No     Is the patient taking medications as prescribed? yes     Who is going to help you get home at discharge? Lives with motherLizzette and sisterTamela     How do you get to doctors appointments? car, drives self     Are you on dialysis? No     Do you take coumadin? No     Discharge Plan A Home with family     Discharge Plan B Home with family     DME Needed Upon Discharge  none     Discharge Plan discussed with: Patient     Discharge Barriers Identified None        Physical Activity    On average, how many days per week do you engage in moderate to strenuous exercise (like a brisk walk)? 7 days     On average, how many minutes do you engage in exercise at this level? 30 min        Financial Resource Strain    How hard is it for you to pay for the very basics like food, housing, medical care, and heating? Not hard  at all        Housing Stability    In the last 12 months, was there a time when you were not able to pay the mortgage or rent on time? No     In the last 12 months, was there a time when you did not have a steady place to sleep or slept in a shelter (including now)? No        Transportation Needs    In the past 12 months, has lack of transportation kept you from medical appointments or from getting medications? No     In the past 12 months, has lack of transportation kept you from meetings, work, or from getting things needed for daily living? No        Food Insecurity    Within the past 12 months, you worried that your food would run out before you got the money to buy more. Never true     Within the past 12 months, the food you bought just didn't last and you didn't have money to get more. Never true        Stress    Do you feel stress - tense, restless, nervous, or anxious, or unable to sleep at night because your mind is troubled all the time - these days? To some extent        Social Connections    In a typical week, how many times do you talk on the phone with family, friends, or neighbors? Twice a week     How often do you get together with friends or relatives? Never     How often do you attend Anglican or Pentecostal services? Never     Do you belong to any clubs or organizations such as Anglican groups, unions, fraternal or athletic groups, or school groups? No     How often do you attend meetings of the clubs or organizations you belong to? Never     Are you , , , , never , or living with a partner?         Alcohol Use    Q1: How often do you have a drink containing alcohol? Never     Q2: How many drinks containing alcohol do you have on a typical day when you are drinking? Patient does not drink     Q3: How often do you have six or more drinks on one occasion? Never        OTHER    Name(s) of People in Home Lives with mother, Lizzette jesus and sister, Tamela Ch

## 2023-04-25 NOTE — H&P
"[facility] Medicine  History & Physical    Patient Name: Janina Klein  MRN: [id number]  Admission Date: 4/24/2023  Attending Physician: Jose Alfredo Osman MD   Primary Care Provider: Perico Tong MD         Patient information was obtained from patient, past medical records and ER records.       Subjective:     Principal Problem:Sepsis due to undetermined organism    Chief Complaint:   Chief Complaint   Patient presents with    Altered Mental Status        HPI:   Ms. Klein is a 62yo lady with a past medical history of anxiety, OA, chronic pain, COPD, GERD, PUD, HTN, and IBS.  She does not wear O2 at home.  She smokes 1ppd cigarettes since 16 yo.  She was recently treated for a UTI by her PCP 3 months ago, at which time she was also referred to Urology for urinary retention.    She states she has done very well since that time, rarely using her nebs for COPD.  This morning she woke up feeling well, then in early morning she developed a low grade fever and, "just felt woozy and off."  She had a little headache with a dry cough.  She decided to just rest for the morning, so took a nap until early afternoon.  When she woke up her cough was a little worse, but not productive.  At this point, she states, "my memory just goes out."  Apparently her mother found her very confused and lethargic and called 911.    The patient does not remember any of this, nor does she remember the ambulance ride to the ED.  Her memory picks up when her mother arrived to the ED room and recounted the events to her.  She tells me she had no N/V, no dysuria or flank pain, and no skin rashes.  She no longer feels confused.    In the ED her VS were /75 -> 90/68   Pulse 114 -> 81   Temp max 101.7F -> 99.2 °F (37.3 °C) (Oral)   Resp 23 -> 12   Ht 5' 4" (1.626 m)   Wt 59.9 kg (132 lb)   SpO2 93% RA -> 96% 2L NC  Breastfeeding No   BMI 22.66 kg/m².  Labs showed WBC 16, Na 133, Cr 0.9, LA 1.3, COVID/flu " negative, UA: nitrite neg, LE neg, RBC 10, WBC 4, moderate bacteria.  CXR showed normal heart size, no infiltrates, interstitial prominence.    CTA chest showed  .  EKG showed sinus tachycardia rate 102, LAE, no acute ST-T changes, QTc 409.    In the ED she was treated with:  Medications   piperacillin-tazobactam (ZOSYN) 3.375 g in dextrose 5 % in water (D5W) 5 % 50 mL IVPB (MB+) (0 g Intravenous Stopped 23)   acetaminophen tablet 1,000 mg (1,000 mg Oral Given 23)   ibuprofen tablet 600 mg (600 mg Oral Given 23)   sodium chloride 0.9% bolus 1,000 mL 1,000 mL (0 mLs Intravenous Stopped 23)               Past Medical History:   Diagnosis Date    Anxiety     Arthritis     osteoarthritis    Chronic pain     Chronic pain     COPD (chronic obstructive pulmonary disease)     GERD (gastroesophageal reflux disease)     History of stomach ulcers     Hypertension     IBS (irritable bowel syndrome)     [facility] 2004    Palpitations        Past Surgical History:   Procedure Laterality Date    APPENDECTOMY       SECTION      CHOLECYSTECTOMY      COLONOSCOPY      ? results in [place]    COLONOSCOPY  2015    Dr. Farfan   diverticulosis    COLONOSCOPY N/A 2020    Procedure: COLONOSCOPY;  Surgeon: Casey Farfan MD;  Location: [facility];  Service: General;  Laterality: N/A;    CYSTOSCOPY      ESOPHAGOGASTRODUODENOSCOPY N/A 2020    Procedure: ESOPHAGOGASTRODUODENOSCOPY (EGD);  Surgeon: Casey Farfan MD;  Location: [facility];  Service: General;  Laterality: N/A;  WITH BXS    ESOPHAGOGASTRODUODENOSCOPY N/A 2020    Procedure: EGD W/ DILAT (ESOPHAGOGASTRODUODENOSCOPY WITH DILATION);  Surgeon: Mike Tong MD;  Location: [facility];  Service: Endoscopy;  Laterality: N/A;    HERNIA REPAIR      HYSTERECTOMY  2014    with bso    LAPAROSCOPIC CHOLECYSTECTOMY N/A 2020    Procedure:  CHOLECYSTECTOMY-LAPAROSCOPIC;  Surgeon: Casey Farfan MD;  Location: [facility];  Service: General;  Laterality: N/A;    TONSILLECTOMY      TOTAL ABDOMINAL HYSTERECTOMY W/ BILATERAL SALPINGOOPHORECTOMY Bilateral        Review of patient's allergies indicates:   Allergen Reactions    Lisinopril        No current facility-administered medications on file prior to encounter.     Current Outpatient Medications on File Prior to Encounter   Medication Sig    amLODIPine (NORVASC) 5 MG tablet TAKE 1 TABLET(5 MG) BY MOUTH EVERY DAY    dicyclomine (BENTYL) 10 MG capsule Take 1 capsule (10 mg total) by mouth 4 (four) times daily as needed.    ergocalciferol (ERGOCALCIFEROL) 50,000 unit Cap Take 1 capsule (50,000 Units total) by mouth every 7 days.    estradioL (ESTRACE) 0.01 % (0.1 mg/gram) vaginal cream Place 1 g vaginally once daily for 45 days, THEN 1 g Every 3 (three) days.    famotidine (PEPCID) 40 MG tablet Take 1 tablet (40 mg total) by mouth once daily.    gabapentin (NEURONTIN) 800 MG tablet Take 1 tablet (800 mg total) by mouth 3 (three) times daily.    metoprolol tartrate (LOPRESSOR) 50 MG tablet Take 1 tablet (50 mg total) by mouth 2 (two) times daily.    mupirocin (BACTROBAN) 2 % ointment Apply topically 3 (three) times daily.    ondansetron (ZOFRAN) 4 MG tablet TAKE 1 TABLET(4 MG) BY MOUTH EVERY 6 HOURS AS NEEDED    pantoprazole (PROTONIX) 40 MG tablet Take 1 tablet (40 mg total) by mouth once daily.    sucralfate (CARAFATE) 1 gram tablet TAKE 2 TABLETS(2 GRAMS) BY MOUTH EVERY DAY    tiotropium-olodateroL (STIOLTO RESPIMAT) 2.5-2.5 mcg/actuation Mist INHALE 2 PUFFS INTO THE LUNGS EVERY DAY    tiZANidine (ZANAFLEX) 4 MG tablet Take 2 tablets (8 mg total) by mouth 3 (three) times daily as needed.    zolpidem (AMBIEN) 10 mg Tab Take 1 tablet (10 mg total) by mouth nightly as needed.    [DISCONTINUED] albuterol-ipratropium 2.5mg-0.5mg/3mL (DUO-NEB) 0.5 mg-3 mg(2.5 mg base)/3 mL nebulizer solution  USE 1 VIAL VIA NEBULIZER FOUR TIMES DAILY    [DISCONTINUED] atorvastatin (LIPITOR) 20 MG tablet Take 1 tablet (20 mg total) by mouth once daily.     Family History       Problem Relation (Age of Onset)    Breast cancer Other    Colon cancer Maternal Grandmother    Diabetes Sister    Hypertension Mother          Tobacco Use    Smoking status: Every Day     Packs/day: 0.50     Years: 0.00     Pack years: 0.00     Types: Cigarettes    Smokeless tobacco: Never   Substance and Sexual Activity    Alcohol use: No    Drug use: No    Sexual activity: Not Currently     Birth control/protection: Abstinence, See Surgical Hx     Review of Systems   Constitutional:  Positive for activity change, appetite change, chills, fatigue and fever.   Respiratory:  Positive for cough.    Cardiovascular:  Negative for chest pain and leg swelling.   Gastrointestinal:  Negative for diarrhea, nausea and vomiting.   Endocrine: Positive for cold intolerance.   Genitourinary:  Positive for difficulty urinating and frequency. Negative for dysuria.   Musculoskeletal:  Positive for myalgias.   Skin:  Negative for rash.   Neurological:  Positive for weakness and headaches.   Psychiatric/Behavioral:  Positive for confusion and decreased concentration.    Objective:     Vital Signs (Most Recent):  Temp: 99.2 °F (37.3 °C) (04/24/23 2045)  Pulse: 81 (04/24/23 2203)  Resp: 12 (04/24/23 2203)  BP: 90/68 (04/24/23 2203)  SpO2: 96 % (04/24/23 2203) Vital Signs (24h Range):  Temp:  [99.2 °F (37.3 °C)-101.7 °F (38.7 °C)] 99.2 °F (37.3 °C)  Pulse:  [] 81  Resp:  [11-23] 12  SpO2:  [93 %-96 %] 96 %  BP: ()/() 90/68     Weight: 59.9 kg (132 lb)  Body mass index is 22.66 kg/m².    Physical Exam  Constitutional:       General: She is awake.      Appearance: She is normal weight. She is not ill-appearing, toxic-appearing or diaphoretic.   HENT:      Head: Normocephalic and atraumatic.   Pulmonary:      Effort: No tachypnea or bradypnea.    Genitourinary:     Comments: No cantu in place  Lymphadenopathy:      Comments: Only trace ankle edema noted   Skin:     Comments: No rashes to arms or legs to suggest cellulitis.   Neurological:      Mental Status: She is alert and oriented to person, place, and time.      GCS: GCS eye subscore is 4. GCS verbal subscore is 5. GCS motor subscore is 6.   Psychiatric:         Attention and Perception: Attention and perception normal.         Mood and Affect: Mood normal.         Cognition and Memory: Cognition normal. Memory is impaired. She exhibits impaired recent memory.           Significant Labs: All pertinent labs within the past 24 hours have been reviewed.  Recent Results (from the past 24 hour(s))   POCT glucose    Collection Time: 04/24/23  7:03 PM   Result Value Ref Range    POCT Glucose 133 (H) 70 - 110 mg/dL   Urinalysis, Reflex to Urine Culture Urine, Clean Catch    Collection Time: 04/24/23  7:46 PM    Specimen: Urine, Clean Catch   Result Value Ref Range    Specimen UA Urine, Clean Catch     Color, UA Yellow Yellow, Straw, Yanni    Appearance, UA Clear Clear    pH, UA 6.0 5.0 - 8.0    Specific Gravity, UA 1.015 1.005 - 1.030    Protein, UA Negative Negative    Glucose, UA Negative Negative    Ketones, UA Negative Negative    Bilirubin (UA) Negative Negative    Occult Blood UA 1+ (A) Negative    Nitrite, UA Negative Negative    Urobilinogen, UA Negative Negative EU/dL    Leukocytes, UA Negative Negative   Urinalysis Microscopic    Collection Time: 04/24/23  7:46 PM   Result Value Ref Range    RBC, UA 10 (H) 0 - 4 /hpf    WBC, UA 4 0 - 5 /hpf    Bacteria Moderate (A) None-Occ /hpf    Yeast, UA Occasional (A) None    Microscopic Comment SEE COMMENT    CBC auto differential    Collection Time: 04/24/23  7:56 PM   Result Value Ref Range    WBC 15.97 (H) 3.90 - 12.70 K/uL    RBC 4.61 4.00 - 5.40 M/uL    Hemoglobin 13.8 12.0 - 16.0 g/dL    Hematocrit 40.6 37.0 - 48.5 %    MCV 88 82 - 98 fL    MCH 29.9 27.0  - 31.0 pg    MCHC 34.0 32.0 - 36.0 g/dL    RDW 13.0 11.5 - 14.5 %    Platelets 201 150 - 450 K/uL    MPV 9.2 9.2 - 12.9 fL    Immature Granulocytes 0.6 (H) 0.0 - 0.5 %    Gran # (ANC) 14.6 (H) 1.8 - 7.7 K/uL    Immature Grans (Abs) 0.09 (H) 0.00 - 0.04 K/uL    Lymph # 0.8 (L) 1.0 - 4.8 K/uL    Mono # 0.5 0.3 - 1.0 K/uL    Eos # 0.0 0.0 - 0.5 K/uL    Baso # 0.03 0.00 - 0.20 K/uL    nRBC 0 0 /100 WBC    Gran % 91.4 (H) 38.0 - 73.0 %    Lymph % 4.7 (L) 18.0 - 48.0 %    Mono % 3.1 (L) 4.0 - 15.0 %    Eosinophil % 0.0 0.0 - 8.0 %    Basophil % 0.2 0.0 - 1.9 %    Differential Method Automated    Comprehensive metabolic panel    Collection Time: 04/24/23  7:56 PM   Result Value Ref Range    Sodium 133 (L) 136 - 145 mmol/L    Potassium 3.7 3.5 - 5.1 mmol/L    Chloride 102 95 - 110 mmol/L    CO2 22 (L) 23 - 29 mmol/L    Glucose 124 (H) 70 - 110 mg/dL    BUN 14 8 - 23 mg/dL    Creatinine 0.9 0.5 - 1.4 mg/dL    Calcium 9.5 8.7 - 10.5 mg/dL    Total Protein 6.6 6.0 - 8.4 g/dL    Albumin 3.7 3.5 - 5.2 g/dL    Total Bilirubin 0.6 0.1 - 1.0 mg/dL    Alkaline Phosphatase 80 55 - 135 U/L    AST 22 10 - 40 U/L    ALT 14 10 - 44 U/L    Anion Gap 9 8 - 16 mmol/L    eGFR >60.0 >60 mL/min/1.73 m^2   Lactic acid, plasma #1    Collection Time: 04/24/23  7:56 PM   Result Value Ref Range    Lactate (Lactic Acid) 1.3 0.5 - 2.2 mmol/L   Magnesium    Collection Time: 04/24/23  7:56 PM   Result Value Ref Range    Magnesium 1.7 1.6 - 2.6 mg/dL   Influenza A & B by Molecular    Collection Time: 04/24/23  8:05 PM    Specimen: Nasopharyngeal Swab   Result Value Ref Range    Influenza A, Molecular Negative Negative    Influenza B, Molecular Negative Negative    Flu A & B Source Nasal Swab    COVID-19 Rapid Screening    Collection Time: 04/24/23  8:05 PM   Result Value Ref Range    SARS-CoV-2 RNA, Amplification, Qual Negative Negative         Significant Imaging: I have reviewed all pertinent imaging results/findings within the past 24  hours.    Assessment/Plan:     * Sepsis due to undetermined organism  Her UA is equivocal  CXR with no uzma consolidation or infiltrates  CTA chest ordered and pending - will follow up shortly  She does have a slightly worsened cough, but dry  ?Viral bronchitis ?Atypical bacterial bronchitis, ?Occult PNA ?Early UTI ?Other viral syndrome  Follow blood and urine CXS  Check Procal  COVID PCR now (rapid covid and flu both negative)  HIV test    This patient does not have evidence of infective focus  My overall impression is sepsis.  Source: Unknown  Antibiotics given-   Antibiotics (72h ago, onward)    Start     Stop Route Frequency Ordered    04/25/23 0430  cefTRIAXone (ROCEPHIN) 2 g in dextrose 5 % in water (D5W) 5 % 50 mL IVPB (MB+)         -- IV Every 24 hours (non-standard times) 04/24/23 2324 04/25/23 0030  azithromycin (ZITHROMAX) 500 mg in dextrose 5 % (D5W) 250 mL IVPB (Vial-Mate)         04/30 0029 IV Every 24 hours (non-standard times) 04/24/23 2324        Latest lactate reviewed-  Recent Labs   Lab 04/24/23  1956   LACTATE 1.3     Organ dysfunction indicated by Encephalopathy    Fluid challenge Not needed - patient is not hypotensive      Post- resuscitation assessment No - Post resuscitation assessment not needed       Will Not start Pressors- Levophed for MAP of 65  Source control achieved by: empiric broad spectrum ABx      Septic encephalopathy  She has no memory at home of her mother calling the ambulance due to confusion  She does not even remember the ambulance ride to the ED  She has headache, but no photophobia or stiff neck to suggest meningitis  She is completely lucid now      COPD (chronic obstructive pulmonary disease)  O2 sats 93% - O2 2L NC to keep sats > 92%        [START ON 4/25/2023] acetaminophen tablet 650 mg, 650 mg, Oral, Q4H PRN    [START ON 4/25/2023] albuterol-ipratropium 2.5 mg-0.5 mg/3 mL nebulizer solution 3 mL, 3 mL, Nebulization, Q8H    [START ON 4/25/2023] benzonatate  capsule 100 mg, 100 mg, Oral, TID PRN, cough    dextromethorphan-guaiFENesin  mg/5 ml liquid 10 mL, 10 mL, Oral, Q6H, x 6    Heavy cigarette smoker  She has smoked 1ppd x 48 years, since 16yo  Tobacco cessation counseling protocol ordered      Essential hypertension  BP 92/61   Pulse 68    Repeating 1l NS now    Started Lopressor 1/2 home dose in am:    [START ON 4/25/2023] metoprolol tartrate (LOPRESSOR) tablet 25 mg, Oral, BID     Home medications currently held:    amLODIPine tablet 5 mg, 5 mg, Oral, Daily    metoprolol tartrate (LOPRESSOR) tablet 50 mg, 50 mg, Oral, BID     Yousif's esophagus without dysplasia    [START ON 4/25/2023] dicyclomine capsule 10 mg, 10 mg, Oral, QID PRN, abd spasms    [START ON 4/25/2023] pantoprazole EC tablet 40 mg, 40 mg, Oral, Daily     [START ON 4/25/2023] sucralfate tablet 2 g, 2 g, Oral, BID     VTE Risk Mitigation (From admission, onward)         Ordered     enoxaparin injection 40 mg  Daily         04/24/23 2332     Place AP hose  Until discontinued         04/24/23 2332     IP VTE HIGH RISK PATIENT  Once         04/24/23 2332     Place sequential compression device  Until discontinued         04/24/23 2332                     The attending portion of this evaluation, treatment, and documentation was performed per TYSON Agarwal MD via Telemedicine AudioVisual using the secure [employer] software platform with 2 way audio/video. The provider was located off-site and the patient is located in the hospital. The aforementioned video software was utilized to document the relevant history and physical exam    On 04/24/2023, patient should be placed in hospital observation services under my care.        TYSON Agarwal MD  Department of Hospital Medicine   [facility] - Emergency Dept

## 2023-04-25 NOTE — ASSESSMENT & PLAN NOTE
BP 92/61   Pulse 68    Repeating 1l NS now    Started Lopressor 1/2 home dose in am:    [START ON 4/25/2023] metoprolol tartrate (LOPRESSOR) tablet 25 mg, Oral, BID     Home medications currently held:    amLODIPine tablet 5 mg, 5 mg, Oral, Daily    metoprolol tartrate (LOPRESSOR) tablet 50 mg, 50 mg, Oral, BID    Thalidomide Counseling: I discussed with the patient the risks of thalidomide including but not limited to birth defects, anxiety, weakness, chest pain, dizziness, cough and severe allergy.

## 2023-04-25 NOTE — NURSING
Spoke with patient's methadone clinic counselor, Harsh, regarding patient's methadone. Okay to give patient tablet form since we do not have liquid form.

## 2023-04-25 NOTE — ASSESSMENT & PLAN NOTE
  [START ON 4/25/2023] dicyclomine capsule 10 mg, 10 mg, Oral, QID PRN, abd spasms    [START ON 4/25/2023] pantoprazole EC tablet 40 mg, 40 mg, Oral, Daily     [START ON 4/25/2023] sucralfate tablet 2 g, 2 g, Oral, BID

## 2023-04-25 NOTE — PROGRESS NOTES
"[facility] Medicine  Progress Note    Patient Name: Janina Klein  MRN: [id number]  Patient Class: OP- Observation   Admission Date: 4/24/2023  Length of Stay: 0 days  Attending Physician: Jose Alfredo Osman MD  Primary Care Provider: Perico Tong MD        Subjective:     Principal Problem:Sepsis due to pneumonia        HPI:  Ms. Klein is a 64yo lady with a past medical history of anxiety, OA, chronic pain, COPD, GERD, PUD, HTN, and IBS.  She does not wear O2 at home.  She smokes 1ppd cigarettes since 14 yo.  She was recently treated for a UTI by her PCP 3 months ago, at which time she was also referred to Urology for urinary retention.    She states she has done very well since that time, rarely using her nebs for COPD.  This morning she woke up feeling well, then in early morning she developed a low grade fever and, "just felt woozy and off."  She had a little headache with a dry cough.  She decided to just rest for the morning, so took a nap until early afternoon.  When she woke up her cough was a little worse, but not productive.  At this point, she states, "my memory just goes out."  Apparently her mother found her very confused and lethargic and called 911.    The patient does not remember any of this, nor does she remember the ambulance ride to the ED.  Her memory picks up when her mother arrived to the ED room and recounted the events to her.  She tells me she had no N/V, no dysuria or flank pain, and no skin rashes.  She no longer feels confused.    In the ED her VS were /75 -> 90/68   Pulse 114 -> 81   Temp max 101.7F -> 99.2 °F (37.3 °C) (Oral)   Resp 23 -> 12   Ht 5' 4" (1.626 m)   Wt 59.9 kg (132 lb)   SpO2 93% RA -> 96% 2L NC  Breastfeeding No   BMI 22.66 kg/m².  Labs showed WBC 16, Na 133, Cr 0.9, LA 1.3, COVID/flu negative, UA: nitrite neg, LE neg, RBC 10, WBC 4, moderate bacteria.  CXR showed normal heart size, no infiltrates, interstitial prominence.    CTA " chest showed  .  EKG showed sinus tachycardia rate 102, LAE, no acute ST-T changes, QTc 409.    In the ED she was treated with:  Medications   piperacillin-tazobactam (ZOSYN) 3.375 g in dextrose 5 % in water (D5W) 5 % 50 mL IVPB (MB+) (0 g Intravenous Stopped 4/24/23 2224)   acetaminophen tablet 1,000 mg (1,000 mg Oral Given 4/24/23 1946)   ibuprofen tablet 600 mg (600 mg Oral Given 4/24/23 1947)   sodium chloride 0.9% bolus 1,000 mL 1,000 mL (0 mLs Intravenous Stopped 4/24/23 2224)               Overview/Hospital Course:  No notes on file    Interval History: Patient doing very well this am. On RA and satting well. Wbc remains elevated. Blood cultures pending. No longer having fever. CT shows multilobar pneumonia bilaterally. Patients mental status is back to normal. She states that she has been slowly feeling worse over past week and became unresponsive yesterday evening per family. CT head was completed that showed no acute findings.     Review of Systems   All other systems reviewed and are negative.  Objective:     Vital Signs (Most Recent):  Temp: 97.7 °F (36.5 °C) (04/25/23 0723)  Pulse: 72 (04/25/23 1135)  Resp: 17 (04/25/23 1314)  BP: 125/79 (04/25/23 1135)  SpO2: 97 % (04/25/23 1135) Vital Signs (24h Range):  Temp:  [97.7 °F (36.5 °C)-101.7 °F (38.7 °C)] 97.7 °F (36.5 °C)  Pulse:  [] 72  Resp:  [8-23] 17  SpO2:  [91 %-100 %] 97 %  BP: ()/() 125/79     Weight: 59.9 kg (132 lb)  Body mass index is 22.66 kg/m².    Intake/Output Summary (Last 24 hours) at 4/25/2023 1602  Last data filed at 4/25/2023 0540  Gross per 24 hour   Intake 2450 ml   Output --   Net 2450 ml      Physical Exam  Vitals reviewed  General: NAD, Well developed, Well Nourished  Head: NC/AT  Eyes: EOMI, SHAKILA  Cardiovascular: Pulses intact distally, Regular Rate and rhythm  Pulmonary: Normal Respiratory Rate, No respiratory distress  Gi: Soft, Non-tender  Extremities: Warm, No edema present  Skin: Warm, dry  Neuro: Alert,  Oriented x3, No focal Deficit  Psych: Appropriate mood and affect      Significant Labs: All pertinent labs within the past 24 hours have been reviewed.    Significant Imaging: I have reviewed all pertinent imaging results/findings within the past 24 hours.      Assessment/Plan:      * Sepsis due to pneumonia  This patient does have evidence of infective focus  My overall impression is sepsis.  Source: Respiratory and Unknown  Antibiotics given-   Antibiotics (72h ago, onward)    Start     Stop Route Frequency Ordered    04/25/23 0430  cefTRIAXone (ROCEPHIN) 2 g in dextrose 5 % in water (D5W) 5 % 50 mL IVPB (MB+)         -- IV Every 24 hours (non-standard times) 04/24/23 2324 04/25/23 0030  azithromycin (ZITHROMAX) 500 mg in dextrose 5 % (D5W) 250 mL IVPB (Vial-Mate)         04/30 0029 IV Every 24 hours (non-standard times) 04/24/23 2324        Latest lactate reviewed-  Recent Labs   Lab 04/24/23  1956   LACTATE 1.3     Organ dysfunction indicated by Encephalopathy    Fluid challenge Not needed - patient is not hypotensive      Post- resuscitation assessment No - Post resuscitation assessment not needed       Will Not start Pressors- Levophed for MAP of 65  Source control achieved by: empiric broad spectrum ABx    Microbiology Results (last 7 days)     Procedure Component Value Units Date/Time    Blood culture x two cultures. Draw prior to antibiotics. [[id number]] Collected: 04/24/23 2010    Order Status: Sent Specimen: Blood from Peripheral, Forearm, Right Updated: 04/25/23 1148    Blood culture x two cultures. Draw prior to antibiotics. [[id number]] Collected: 04/24/23 2025    Order Status: Sent Specimen: Blood from Peripheral, Hand, Right Updated: 04/25/23 1148    Influenza A & B by Molecular [[id number]] Collected: 04/24/23 2005    Order Status: Completed Specimen: Nasopharyngeal Swab Updated: 04/24/23 2111     Influenza A, Molecular Negative     Influenza B, Molecular Negative     Flu A & B Source Nasal Swab         Procalcitonin elevated  F/U cultures  Continue IV abx      COPD (chronic obstructive pulmonary disease)  Acute hypoxic respiratory failure 2/2 COPD exacerbation caused by bacterial CAP (unknown organism)  Continue IV abx, PO steroids, Scheduled breathing treatments  On RA today  Continuous pulse ox  Keep O2>88%    Yousif's esophagus without dysplasia    [START ON 4/25/2023] dicyclomine capsule 10 mg, 10 mg, Oral, QID PRN, abd spasms    [START ON 4/25/2023] pantoprazole EC tablet 40 mg, 40 mg, Oral, Daily     [START ON 4/25/2023] sucralfate tablet 2 g, 2 g, Oral, BID     Heavy cigarette smoker  She has smoked 1ppd x 48 years, since 16yo  Tobacco cessation counseling protocol ordered      Essential hypertension  Restart home medications as needed and titrate as needed      VTE Risk Mitigation (From admission, onward)         Ordered     enoxaparin injection 40 mg  Daily         04/24/23 2332     Place AP hose  Until discontinued         04/24/23 2332     IP VTE HIGH RISK PATIENT  Once         04/24/23 2332     Place sequential compression device  Until discontinued         04/24/23 2332                Discharge Planning   AZALIA:      Code Status: Full Code   Is the patient medically ready for discharge?:     Reason for patient still in hospital (select all that apply): Treatment  Discharge Plan A: Home with family                  Jose Alfredo Osman MD  Department of Hospital Medicine   [facility] Emergency Dept

## 2023-04-25 NOTE — PLAN OF CARE
04/25/23 1125   NIETO Message   Medicare Outpatient and Observation Notification regarding financial responsibility Explained to patient/caregiver;Signed/date by patient/caregiver   Date NIETO was signed 04/25/23   Time NIETO was signed 1124

## 2023-04-25 NOTE — CARE UPDATE
CT chest:   IMPRESSION:  1. Mild centrilobular emphysema.  2. Moderate superimposed peribronchial inflammatory changes consistent with bronchitis that may  be infectious or inflammatory.  3. Moderate bibasilar atelectatic changes with nonspecific consolidation. In the appropriate clinical  setting, this may represent bibasilar pneumonia.    Likely reflects early PNA vs infectious bronchitis.  Continue Rocephin and Zithro.

## 2023-04-26 VITALS
SYSTOLIC BLOOD PRESSURE: 141 MMHG | BODY MASS INDEX: 24.24 KG/M2 | OXYGEN SATURATION: 96 % | HEIGHT: 64 IN | DIASTOLIC BLOOD PRESSURE: 81 MMHG | TEMPERATURE: 98 F | WEIGHT: 142 LBS | HEART RATE: 71 BPM | RESPIRATION RATE: 17 BRPM

## 2023-04-26 LAB
ALBUMIN SERPL BCP-MCNC: 3.2 G/DL (ref 3.5–5.2)
ALP SERPL-CCNC: 74 U/L (ref 55–135)
ALT SERPL W/O P-5'-P-CCNC: 14 U/L (ref 10–44)
ANION GAP SERPL CALC-SCNC: 7 MMOL/L (ref 8–16)
AST SERPL-CCNC: 16 U/L (ref 10–40)
BASOPHILS # BLD AUTO: 0.01 K/UL (ref 0–0.2)
BASOPHILS NFR BLD: 0.1 % (ref 0–1.9)
BILIRUB SERPL-MCNC: 0.2 MG/DL (ref 0.1–1)
BUN SERPL-MCNC: 10 MG/DL (ref 8–23)
CALCIUM SERPL-MCNC: 9 MG/DL (ref 8.7–10.5)
CHLORIDE SERPL-SCNC: 105 MMOL/L (ref 95–110)
CO2 SERPL-SCNC: 23 MMOL/L (ref 23–29)
CREAT SERPL-MCNC: 0.7 MG/DL (ref 0.5–1.4)
DIFFERENTIAL METHOD: ABNORMAL
EOSINOPHIL # BLD AUTO: 0 K/UL (ref 0–0.5)
EOSINOPHIL NFR BLD: 0 % (ref 0–8)
ERYTHROCYTE [DISTWIDTH] IN BLOOD BY AUTOMATED COUNT: 13.1 % (ref 11.5–14.5)
EST. GFR  (NO RACE VARIABLE): >60 ML/MIN/1.73 M^2
GLUCOSE SERPL-MCNC: 125 MG/DL (ref 70–110)
HCT VFR BLD AUTO: 34.9 % (ref 37–48.5)
HGB BLD-MCNC: 11.9 G/DL (ref 12–16)
IMM GRANULOCYTES # BLD AUTO: 0.03 K/UL (ref 0–0.04)
IMM GRANULOCYTES NFR BLD AUTO: 0.3 % (ref 0–0.5)
LYMPHOCYTES # BLD AUTO: 1 K/UL (ref 1–4.8)
LYMPHOCYTES NFR BLD: 9.6 % (ref 18–48)
MAGNESIUM SERPL-MCNC: 2 MG/DL (ref 1.6–2.6)
MCH RBC QN AUTO: 30.4 PG (ref 27–31)
MCHC RBC AUTO-ENTMCNC: 34.1 G/DL (ref 32–36)
MCV RBC AUTO: 89 FL (ref 82–98)
MONOCYTES # BLD AUTO: 0.2 K/UL (ref 0.3–1)
MONOCYTES NFR BLD: 1.8 % (ref 4–15)
NEUTROPHILS # BLD AUTO: 9.4 K/UL (ref 1.8–7.7)
NEUTROPHILS NFR BLD: 88.2 % (ref 38–73)
NRBC BLD-RTO: 0 /100 WBC
PHOSPHATE SERPL-MCNC: 3.1 MG/DL (ref 2.7–4.5)
PLATELET # BLD AUTO: 186 K/UL (ref 150–450)
PMV BLD AUTO: 9.6 FL (ref 9.2–12.9)
POTASSIUM SERPL-SCNC: 4.8 MMOL/L (ref 3.5–5.1)
PROT SERPL-MCNC: 6 G/DL (ref 6–8.4)
RBC # BLD AUTO: 3.91 M/UL (ref 4–5.4)
SARS-COV-2 RNA RESP QL NAA+PROBE: NOT DETECTED
SODIUM SERPL-SCNC: 135 MMOL/L (ref 136–145)
WBC # BLD AUTO: 10.66 K/UL (ref 3.9–12.7)

## 2023-04-26 PROCEDURE — G0378 HOSPITAL OBSERVATION PER HR: HCPCS

## 2023-04-26 PROCEDURE — 63600175 PHARM REV CODE 636 W HCPCS: Performed by: STUDENT IN AN ORGANIZED HEALTH CARE EDUCATION/TRAINING PROGRAM

## 2023-04-26 PROCEDURE — 83735 ASSAY OF MAGNESIUM: CPT | Performed by: INTERNAL MEDICINE

## 2023-04-26 PROCEDURE — 25000003 PHARM REV CODE 250: Performed by: STUDENT IN AN ORGANIZED HEALTH CARE EDUCATION/TRAINING PROGRAM

## 2023-04-26 PROCEDURE — 99238 HOSP IP/OBS DSCHRG MGMT 30/<: CPT | Mod: ,,, | Performed by: STUDENT IN AN ORGANIZED HEALTH CARE EDUCATION/TRAINING PROGRAM

## 2023-04-26 PROCEDURE — 99238 PR HOSPITAL DISCHARGE DAY,<30 MIN: ICD-10-PCS | Mod: ,,, | Performed by: STUDENT IN AN ORGANIZED HEALTH CARE EDUCATION/TRAINING PROGRAM

## 2023-04-26 PROCEDURE — 63600175 PHARM REV CODE 636 W HCPCS: Performed by: INTERNAL MEDICINE

## 2023-04-26 PROCEDURE — 96366 THER/PROPH/DIAG IV INF ADDON: CPT

## 2023-04-26 PROCEDURE — 80053 COMPREHEN METABOLIC PANEL: CPT | Performed by: INTERNAL MEDICINE

## 2023-04-26 PROCEDURE — 25000242 PHARM REV CODE 250 ALT 637 W/ HCPCS: Performed by: INTERNAL MEDICINE

## 2023-04-26 PROCEDURE — 94640 AIRWAY INHALATION TREATMENT: CPT

## 2023-04-26 PROCEDURE — 85025 COMPLETE CBC W/AUTO DIFF WBC: CPT | Performed by: INTERNAL MEDICINE

## 2023-04-26 PROCEDURE — 94761 N-INVAS EAR/PLS OXIMETRY MLT: CPT

## 2023-04-26 PROCEDURE — 25000003 PHARM REV CODE 250: Performed by: INTERNAL MEDICINE

## 2023-04-26 PROCEDURE — 84100 ASSAY OF PHOSPHORUS: CPT | Performed by: INTERNAL MEDICINE

## 2023-04-26 PROCEDURE — 97162 PT EVAL MOD COMPLEX 30 MIN: CPT

## 2023-04-26 RX ORDER — PREDNISONE 20 MG/1
40 TABLET ORAL DAILY
Qty: 6 TABLET | Refills: 0 | Status: SHIPPED | OUTPATIENT
Start: 2023-04-26 | End: 2023-04-26 | Stop reason: SDUPTHER

## 2023-04-26 RX ORDER — AMOXICILLIN AND CLAVULANATE POTASSIUM 875; 125 MG/1; MG/1
1 TABLET, FILM COATED ORAL EVERY 12 HOURS
Qty: 6 TABLET | Refills: 0 | Status: SHIPPED | OUTPATIENT
Start: 2023-04-26 | End: 2023-04-26 | Stop reason: SDUPTHER

## 2023-04-26 RX ORDER — IPRATROPIUM BROMIDE AND ALBUTEROL SULFATE 2.5; .5 MG/3ML; MG/3ML
3 SOLUTION RESPIRATORY (INHALATION) EVERY 6 HOURS PRN
Qty: 75 ML | Refills: 0 | Status: SHIPPED | OUTPATIENT
Start: 2023-04-26 | End: 2024-01-04

## 2023-04-26 RX ORDER — IBUPROFEN 200 MG
1 TABLET ORAL DAILY
Qty: 42 PATCH | Refills: 0 | Status: SHIPPED | OUTPATIENT
Start: 2023-04-26 | End: 2023-04-26 | Stop reason: SDUPTHER

## 2023-04-26 RX ORDER — AMOXICILLIN AND CLAVULANATE POTASSIUM 875; 125 MG/1; MG/1
1 TABLET, FILM COATED ORAL EVERY 12 HOURS
Qty: 6 TABLET | Refills: 0 | Status: SHIPPED | OUTPATIENT
Start: 2023-04-26 | End: 2023-04-29

## 2023-04-26 RX ORDER — AZITHROMYCIN 500 MG/1
500 TABLET, FILM COATED ORAL DAILY
Qty: 1 TABLET | Refills: 0 | Status: SHIPPED | OUTPATIENT
Start: 2023-04-26 | End: 2023-04-27

## 2023-04-26 RX ORDER — IPRATROPIUM BROMIDE AND ALBUTEROL SULFATE 2.5; .5 MG/3ML; MG/3ML
3 SOLUTION RESPIRATORY (INHALATION) EVERY 6 HOURS PRN
Qty: 75 ML | Refills: 0 | Status: SHIPPED | OUTPATIENT
Start: 2023-04-26 | End: 2023-04-26 | Stop reason: SDUPTHER

## 2023-04-26 RX ORDER — ALBUTEROL SULFATE 90 UG/1
2 AEROSOL, METERED RESPIRATORY (INHALATION) EVERY 6 HOURS PRN
Qty: 18 G | Refills: 0 | Status: SHIPPED | OUTPATIENT
Start: 2023-04-26 | End: 2023-04-26 | Stop reason: SDUPTHER

## 2023-04-26 RX ORDER — IBUPROFEN 200 MG
1 TABLET ORAL DAILY
Qty: 42 PATCH | Refills: 0 | Status: SHIPPED | OUTPATIENT
Start: 2023-04-26 | End: 2023-06-07

## 2023-04-26 RX ORDER — AZITHROMYCIN 500 MG/1
500 TABLET, FILM COATED ORAL DAILY
Qty: 1 TABLET | Refills: 0 | Status: SHIPPED | OUTPATIENT
Start: 2023-04-26 | End: 2023-04-26 | Stop reason: SDUPTHER

## 2023-04-26 RX ORDER — ALBUTEROL SULFATE 90 UG/1
2 AEROSOL, METERED RESPIRATORY (INHALATION) EVERY 6 HOURS PRN
Qty: 18 G | Refills: 0 | Status: SHIPPED | OUTPATIENT
Start: 2023-04-26 | End: 2024-01-04

## 2023-04-26 RX ORDER — PREDNISONE 20 MG/1
40 TABLET ORAL DAILY
Qty: 6 TABLET | Refills: 0 | Status: SHIPPED | OUTPATIENT
Start: 2023-04-26 | End: 2023-04-29

## 2023-04-26 RX ADMIN — SUCRALFATE 2 G: 1 TABLET ORAL at 07:04

## 2023-04-26 RX ADMIN — PANTOPRAZOLE SODIUM 40 MG: 40 TABLET, DELAYED RELEASE ORAL at 09:04

## 2023-04-26 RX ADMIN — GABAPENTIN 800 MG: 100 CAPSULE ORAL at 09:04

## 2023-04-26 RX ADMIN — IPRATROPIUM BROMIDE AND ALBUTEROL SULFATE 3 ML: .5; 3 SOLUTION RESPIRATORY (INHALATION) at 07:04

## 2023-04-26 RX ADMIN — CEFTRIAXONE SODIUM 2 G: 2 INJECTION, POWDER, FOR SOLUTION INTRAMUSCULAR; INTRAVENOUS at 03:04

## 2023-04-26 RX ADMIN — GUAIFENESIN AND DEXTROMETHORPHAN 10 ML: 100; 10 SYRUP ORAL at 05:04

## 2023-04-26 RX ADMIN — PREDNISONE 40 MG: 10 TABLET ORAL at 09:04

## 2023-04-26 RX ADMIN — METHADONE HYDROCHLORIDE 60 MG: 10 TABLET ORAL at 10:04

## 2023-04-26 RX ADMIN — CHOLECALCIFEROL (VITAMIN D3) 10 MCG (400 UNIT) TABLET 1200 UNITS: at 09:04

## 2023-04-26 NOTE — PLAN OF CARE
Problem: Adult Inpatient Plan of Care  Goal: Plan of Care Review  Outcome: Ongoing, Progressing  Goal: Patient-Specific Goal (Individualized)  Outcome: Ongoing, Progressing  Goal: Absence of Hospital-Acquired Illness or Injury  Outcome: Ongoing, Progressing  Goal: Optimal Comfort and Wellbeing  Outcome: Ongoing, Progressing  Goal: Readiness for Transition of Care  Outcome: Ongoing, Progressing     Problem: Infection  Goal: Absence of Infection Signs and Symptoms  Outcome: Ongoing, Progressing     Problem: Adjustment to Illness (Sepsis/Septic Shock)  Goal: Optimal Coping  Outcome: Ongoing, Progressing     Problem: Bleeding (Sepsis/Septic Shock)  Goal: Absence of Bleeding  Outcome: Ongoing, Progressing     Problem: Glycemic Control Impaired (Sepsis/Septic Shock)  Goal: Blood Glucose Level Within Desired Range  Outcome: Ongoing, Progressing     Problem: Infection Progression (Sepsis/Septic Shock)  Goal: Absence of Infection Signs and Symptoms  Outcome: Ongoing, Progressing     Problem: Nutrition Impaired (Sepsis/Septic Shock)  Goal: Optimal Nutrition Intake  Outcome: Ongoing, Progressing     Problem: Fluid Imbalance (Pneumonia)  Goal: Fluid Balance  Outcome: Ongoing, Progressing     Problem: Infection (Pneumonia)  Goal: Resolution of Infection Signs and Symptoms  Outcome: Ongoing, Progressing     Problem: Respiratory Compromise (Pneumonia)  Goal: Effective Oxygenation and Ventilation  Outcome: Ongoing, Progressing  POC reviewed at bedside. Questions and concerns addressed. VSS. Placed bed in low and locked position. Call light within reach. Side rails up x2. Instructed to call for any needs. Verbalized understanding of all instructions. Frequent rounds.

## 2023-04-26 NOTE — PLAN OF CARE
[facility] - Intensive Care  Discharge Final Note    Primary Care Provider: Perico Tong MD    Expected Discharge Date: 4/26/2023    Verbal follow up appointment with Dr Gonzáles provided to patient. Demonstrated understanding by verbal feedback. Provided her with nebulizer from [email or web address] approved to pull per Iris. Denies any other needs at this time. Family at bedside.     Final Discharge Note (most recent)       Final Note - 04/26/23 1105          Final Note    Assessment Type Final Discharge Note     Anticipated Discharge Disposition Home or Self Care     What phone number can be called within the next 1-3 days to see how you are doing after discharge? [phone]     Hospital Resources/Appts/Education Provided Appointments scheduled and added to AVS        Post-Acute Status    [facility]/Auth obtained     Discharge Delays None known at this time                     Important Message from Medicare             Contact Info       Perico Tong MD   Specialty: Family Medicine   Relationship: PCP - General    [address] [place] [address]   Phone: [phone]       Next Steps: Follow up    Pepper Gonzáles MD   Specialty: Family Medicine    [address]  [place] [address]   Phone: [phone]       Next Steps: Go on 5/10/2023    Instructions: follow up appointment scheduled for Wednesday, May 10 at 10:40 am

## 2023-04-26 NOTE — NURSING
D/C instructions provided and explained to pt and pt's mother, understanding of D/C instructions verbalized. IV removed, catheter intact. Tolerated well. Telemetry monitor removed and returned to monitor room. VSS. Pt denies complaints. Ambulated off unit with all belongings in hand.

## 2023-04-26 NOTE — PT/OT/SLP EVAL
"Physical Therapy Evaluation and Discharge Note    Patient Name:  Janina Klein   MRN:  [id number]    Recommendations:     Discharge Recommendations:  (home with family support)  Discharge Equipment Recommendations: none   Barriers to discharge: None    Assessment:   HPI:  Ms. Klein is a 64yo lady with a past medical history of anxiety, OA, chronic pain, COPD, GERD, PUD, HTN, and IBS.  She does not wear O2 at home.  She smokes 1ppd cigarettes since 16 yo.  She was recently treated for a UTI by her PCP 3 months ago, at which time she was also referred to Urology for urinary retention.     She states she has done very well since that time, rarely using her nebs for COPD.  This morning she woke up feeling well, then in early morning she developed a low grade fever and, "just felt woozy and off."  She had a little headache with a dry cough.  She decided to just rest for the morning, so took a nap until early afternoon.  When she woke up her cough was a little worse, but not productive.  At this point, she states, "my memory just goes out."  Apparently her mother found her very confused and lethargic and called 911.     The patient does not remember any of this, nor does she remember the ambulance ride to the ED.  Her memory picks up when her mother arrived to the ED room and recounted the events to her.  She tells me she had no N/V, no dysuria or flank pain, and no skin rashes.  She no longer feels confused.     In the ED her VS were /75 -> 90/68   Pulse 114 -> 81   Temp max 101.7F -> 99.2 °F (37.3 °C) (Oral)   Resp 23 -> 12   Ht 5' 4" (1.626 m)   Wt 59.9 kg (132 lb)   SpO2 93% RA -> 96% 2L NC  Breastfeeding No   BMI 22.66 kg/m².  Labs showed WBC 16, Na 133, Cr 0.9, LA 1.3, COVID/flu negative, UA: nitrite neg, LE neg, RBC 10, WBC 4, moderate bacteria.  CXR showed normal heart size, no infiltrates, interstitial prominence.     CTA chest showed  .  EKG showed sinus tachycardia rate 102, LAE, no acute ST-T " changes, QTc 409.     Janina Klein is a 63 y.o. female admitted with a medical diagnosis of Sepsis due to pneumonia. .  At this time, patient is functioning at their prior level of function and does not require further acute PT services.     Recent Surgery: * No surgery found *      Plan:     During this hospitalization, patient does not require further acute PT services.  Please re-consult if situation changes.      Subjective     Chief Complaint: sepsis due to pneumonia  Patient/Family Comments/goals: discharge home today  Pain/Comfort:  Pain Rating 1: 0/10    Patients cultural, spiritual, Mormon conflicts given the current situation: no    Living Environment:  Patient lives with her mother and sister in a  home with no steps to enter.  Prior to admission, patients level of function was independent ADL's and IADL's.  Equipment used at home: none.  DME owned (not currently used): none.  Upon discharge, patient will have assistance from family.    Objective:     Communicated with RN prior to session.  Patient found HOB elevated with peripheral IV upon PT entry to room.    General Precautions: Standard,      Orthopedic Precautions:N/A   Braces: N/A  Respiratory Status: Room air    Exams:  Cognitive Exam:  Patient is oriented to Person, Place, Time, and Situation  RLE ROM: WFL  RLE Strength: WFL  LLE ROM: WFL  LLE Strength: WFL    Functional Mobility:  Bed Mobility:  Supine to Sit: modified independence  Sit to Supine: modified independence  Transfers:  Sit to Stand:  independence with no AD  Gait: patient ambulated 300' in hallway independently with no use of AD    AM-PAC 6 CLICK MOBILITY  Total Score:        Treatment and Education:  Patient is mod I/I with bed mobility and transfers. Patient ambulated 300' independently with no use of AD. No skilled PT needs identified at this time and she is expected to discharge home with her family today.    AM-PAC 6 CLICK MOBILITY  Total Score:      Patient left HOB  elevated with all lines intact, call button in reach, and RN notified.    GOALS:   No skilled PT needs identified at this time, she is expected to discharge home with her family today      History:     Past Medical History:   Diagnosis Date    Anxiety     Arthritis     osteoarthritis    Chronic pain     Chronic pain     COPD (chronic obstructive pulmonary disease)     GERD (gastroesophageal reflux disease)     History of stomach ulcers     Hypertension     IBS (irritable bowel syndrome)     [facility] 2004    Palpitations        Past Surgical History:   Procedure Laterality Date    APPENDECTOMY       SECTION      CHOLECYSTECTOMY      COLONOSCOPY      ? results in [place]    COLONOSCOPY  2015    Dr. Farfan   diverticulosis    COLONOSCOPY N/A 2020    Procedure: COLONOSCOPY;  Surgeon: Casey Farfan MD;  Location: [facility] ENDO;  Service: General;  Laterality: N/A;    CYSTOSCOPY      ESOPHAGOGASTRODUODENOSCOPY N/A 2020    Procedure: ESOPHAGOGASTRODUODENOSCOPY (EGD);  Surgeon: Casey Farfan MD;  Location: [facility] ENDO;  Service: General;  Laterality: N/A;  WITH BXS    ESOPHAGOGASTRODUODENOSCOPY N/A 2020    Procedure: EGD W/ DILAT (ESOPHAGOGASTRODUODENOSCOPY WITH DILATION);  Surgeon: Mike Tong MD;  Location: [facility] ENDO;  Service: Endoscopy;  Laterality: N/A;    HERNIA REPAIR      HYSTERECTOMY  2014    with bso    LAPAROSCOPIC CHOLECYSTECTOMY N/A 2020    Procedure: CHOLECYSTECTOMY-LAPAROSCOPIC;  Surgeon: Casey Farfan MD;  Location: [facility] OR;  Service: General;  Laterality: N/A;    TONSILLECTOMY      TOTAL ABDOMINAL HYSTERECTOMY W/ BILATERAL SALPINGOOPHORECTOMY Bilateral        Time Tracking:     PT Received On: 23  PT Start Time: 909     PT Stop Time: 930  PT Total Time (min): 21 min     Billable Minutes: Evaluation 21 min      2023

## 2023-04-27 NOTE — HOSPITAL COURSE
Patient admitted for sepsis w/ acute encephalopathy and acute hypoxic respiratory failure 2/2 acute bacterial CAP (unknown organism). Patient treated with abx, breathing treatments, and steroids. Quickly weaned to RA. Due to patients high acuity level at admit was monitored for 24h on abx prior to discharge. BC NGTD at time of discharge. Patient switched to PO abx at discharge. Prescribed steroids and duonebs. Counseled on tobacco cessation. Provided nicotine patches at discharge. Provided discharge instructions and return precautions.

## 2023-04-27 NOTE — DISCHARGE SUMMARY
"Formerly [facility]  Discharge Summary      Patient Name: Janina Klein  MRN: [id number]  [facility]: [id number]  Patient Class: OP- Observation  Admission Date: 4/24/2023  Hospital Length of Stay: 0 days  Discharge Date and Time: 4/26/2023 11:45 AM  Attending Physician: No att. providers found   Discharging Provider: Jose Alfredo Osman MD  Primary Care Provider: Perico Tong MD    Primary Care Team: Networked reference to record PCT     HPI:   Ms. Klein is a 64yo lady with a past medical history of anxiety, OA, chronic pain, COPD, GERD, PUD, HTN, and IBS.  She does not wear O2 at home.  She smokes 1ppd cigarettes since 14 yo.  She was recently treated for a UTI by her PCP 3 months ago, at which time she was also referred to Urology for urinary retention.    She states she has done very well since that time, rarely using her nebs for COPD.  This morning she woke up feeling well, then in early morning she developed a low grade fever and, "just felt woozy and off."  She had a little headache with a dry cough.  She decided to just rest for the morning, so took a nap until early afternoon.  When she woke up her cough was a little worse, but not productive.  At this point, she states, "my memory just goes out."  Apparently her mother found her very confused and lethargic and called 911.    The patient does not remember any of this, nor does she remember the ambulance ride to the ED.  Her memory picks up when her mother arrived to the ED room and recounted the events to her.  She tells me she had no N/V, no dysuria or flank pain, and no skin rashes.  She no longer feels confused.    In the ED her VS were /75 -> 90/68   Pulse 114 -> 81   Temp max 101.7F -> 99.2 °F (37.3 °C) (Oral)   Resp 23 -> 12   Ht 5' 4" (1.626 m)   Wt 59.9 kg (132 lb)   SpO2 93% RA -> 96% 2L NC  Breastfeeding No   BMI 22.66 kg/m².  Labs showed WBC 16, Na 133, Cr 0.9, LA 1.3, COVID/flu negative, UA: nitrite neg, LE neg, " RBC 10, WBC 4, moderate bacteria.  CXR showed normal heart size, no infiltrates, interstitial prominence.    CTA chest showed  .  EKG showed sinus tachycardia rate 102, LAE, no acute ST-T changes, QTc 409.    In the ED she was treated with:  Medications   piperacillin-tazobactam (ZOSYN) 3.375 g in dextrose 5 % in water (D5W) 5 % 50 mL IVPB (MB+) (0 g Intravenous Stopped 4/24/23 2224)   acetaminophen tablet 1,000 mg (1,000 mg Oral Given 4/24/23 1946)   ibuprofen tablet 600 mg (600 mg Oral Given 4/24/23 1947)   sodium chloride 0.9% bolus 1,000 mL 1,000 mL (0 mLs Intravenous Stopped 4/24/23 2224)               * No surgery found *      Hospital Course:   Patient admitted for sepsis w/ acute encephalopathy and acute hypoxic respiratory failure 2/2 acute bacterial CAP (unknown organism). Patient treated with abx, breathing treatments, and steroids. Quickly weaned to RA. Due to patients high acuity level at admit was monitored for 24h on abx prior to discharge. BC NGTD at time of discharge. Patient switched to PO abx at discharge. Prescribed steroids and duonebs. Counseled on tobacco cessation. Provided nicotine patches at discharge. Provided discharge instructions and return precautions.        Goals of Care Treatment Preferences:  Code Status: Full Code      Consults:     No new Assessment & Plan notes have been filed under this hospital service since the last note was generated.  Service: Hospital Medicine    Final Active Diagnoses:    Diagnosis Date Noted POA    PRINCIPAL PROBLEM:  Sepsis due to pneumonia [J18.9, A41.9] 04/24/2023 Unknown    COPD (chronic obstructive pulmonary disease) [J44.9] 04/30/2018 Yes    Yousif's esophagus without dysplasia [K22.70] 07/29/2017 Yes    Heavy cigarette smoker [F17.210] 10/17/2016 Yes    Essential hypertension [I10] 04/15/2014 Yes      Problems Resolved During this Admission:    Diagnosis Date Noted Date Resolved POA    Septic encephalopathy [G93.41] 04/24/2023 04/25/2023  "Yes       Discharged Condition: good    Disposition: Home or Self Care    Follow Up:   Follow-up Information     Perico Tong MD Follow up.    Specialty: Family Medicine  Contact information:  [address] [place] [address]  [phone]             Pepper Gonzáles MD. Go on 5/10/2023.    Specialty: Family Medicine  Why: follow up appointment scheduled for Wednesday, May 10 at 10:40 am  Contact information:  [address] [place] [address]  [phone]                       Patient Instructions:      NEBULIZER FOR HOME USE     Order Specific Question Answer Comments   Height: 5' 4" (1.626 m)    Weight: 64.4 kg (141 lb 15.6 oz)    Does patient have medical equipment at home? none    Length of need (1-99 months): 99      Diet Adult Regular     Notify your health care provider if you experience any of the following:  temperature >100.4     Notify your health care provider if you experience any of the following:  difficulty breathing or increased cough     Activity as tolerated       Significant Diagnostic Studies:   Recent Results (from the past 168 hour(s))   POCT glucose    Collection Time: 04/24/23  7:03 PM   Result Value Ref Range    POCT Glucose 133 (H) 70 - 110 mg/dL   Urinalysis, Reflex to Urine Culture Urine, Clean Catch    Collection Time: 04/24/23  7:46 PM    Specimen: Urine, Clean Catch   Result Value Ref Range    Specimen UA Urine, Clean Catch     Color, UA Yellow Yellow, Straw, Yanni    Appearance, UA Clear Clear    pH, UA 6.0 5.0 - 8.0    Specific Gravity, UA 1.015 1.005 - 1.030    Protein, UA Negative Negative    Glucose, UA Negative Negative    Ketones, UA Negative Negative    Bilirubin (UA) Negative Negative    Occult Blood UA 1+ (A) Negative    Nitrite, UA Negative Negative    Urobilinogen, UA Negative Negative EU/dL    Leukocytes, UA Negative Negative   Urinalysis Microscopic    Collection Time: 04/24/23  7:46 PM   Result Value Ref Range    RBC, UA 10 (H) 0 - 4 /hpf    " WBC, UA 4 0 - 5 /hpf    Bacteria Moderate (A) None-Occ /hpf    Yeast, UA Occasional (A) None    Microscopic Comment SEE COMMENT    HIV 1/2 Ag/Ab (4th Gen)    Collection Time: 04/24/23  7:56 PM   Result Value Ref Range    HIV 1/2 Ag/Ab Non-reactive Non-reactive   Hepatitis C Antibody    Collection Time: 04/24/23  7:56 PM   Result Value Ref Range    Hepatitis C Ab Non-reactive Non-reactive   CBC auto differential    Collection Time: 04/24/23  7:56 PM   Result Value Ref Range    WBC 15.97 (H) 3.90 - 12.70 K/uL    RBC 4.61 4.00 - 5.40 M/uL    Hemoglobin 13.8 12.0 - 16.0 g/dL    Hematocrit 40.6 37.0 - 48.5 %    MCV 88 82 - 98 fL    MCH 29.9 27.0 - 31.0 pg    MCHC 34.0 32.0 - 36.0 g/dL    RDW 13.0 11.5 - 14.5 %    Platelets 201 150 - 450 K/uL    MPV 9.2 9.2 - 12.9 fL    Immature Granulocytes 0.6 (H) 0.0 - 0.5 %    Gran # (ANC) 14.6 (H) 1.8 - 7.7 K/uL    Immature Grans (Abs) 0.09 (H) 0.00 - 0.04 K/uL    Lymph # 0.8 (L) 1.0 - 4.8 K/uL    Mono # 0.5 0.3 - 1.0 K/uL    Eos # 0.0 0.0 - 0.5 K/uL    Baso # 0.03 0.00 - 0.20 K/uL    nRBC 0 0 /100 WBC    Gran % 91.4 (H) 38.0 - 73.0 %    Lymph % 4.7 (L) 18.0 - 48.0 %    Mono % 3.1 (L) 4.0 - 15.0 %    Eosinophil % 0.0 0.0 - 8.0 %    Basophil % 0.2 0.0 - 1.9 %    Differential Method Automated    Comprehensive metabolic panel    Collection Time: 04/24/23  7:56 PM   Result Value Ref Range    Sodium 133 (L) 136 - 145 mmol/L    Potassium 3.7 3.5 - 5.1 mmol/L    Chloride 102 95 - 110 mmol/L    CO2 22 (L) 23 - 29 mmol/L    Glucose 124 (H) 70 - 110 mg/dL    BUN 14 8 - 23 mg/dL    Creatinine 0.9 0.5 - 1.4 mg/dL    Calcium 9.5 8.7 - 10.5 mg/dL    Total Protein 6.6 6.0 - 8.4 g/dL    Albumin 3.7 3.5 - 5.2 g/dL    Total Bilirubin 0.6 0.1 - 1.0 mg/dL    Alkaline Phosphatase 80 55 - 135 U/L    AST 22 10 - 40 U/L    ALT 14 10 - 44 U/L    Anion Gap 9 8 - 16 mmol/L    eGFR >60.0 >60 mL/min/1.73 m^2   Lactic acid, plasma #1    Collection Time: 04/24/23  7:56 PM   Result Value Ref Range    Lactate  (Lactic Acid) 1.3 0.5 - 2.2 mmol/L   Magnesium    Collection Time: 04/24/23  7:56 PM   Result Value Ref Range    Magnesium 1.7 1.6 - 2.6 mg/dL   Influenza A & B by Molecular    Collection Time: 04/24/23  8:05 PM    Specimen: Nasopharyngeal Swab   Result Value Ref Range    Influenza A, Molecular Negative Negative    Influenza B, Molecular Negative Negative    Flu A & B Source Nasal Swab    COVID-19 Rapid Screening    Collection Time: 04/24/23  8:05 PM   Result Value Ref Range    SARS-CoV-2 RNA, Amplification, Qual Negative Negative   Blood culture x two cultures. Draw prior to antibiotics.    Collection Time: 04/24/23  8:10 PM    Specimen: Peripheral, Forearm, Right; Blood   Result Value Ref Range    Blood Culture, Routine No Growth to date     Blood Culture, Routine No Growth to date     Blood Culture, Routine No Growth to date    Blood culture x two cultures. Draw prior to antibiotics.    Collection Time: 04/24/23  8:25 PM    Specimen: Peripheral, Hand, Right; Blood   Result Value Ref Range    Blood Culture, Routine No Growth to date     Blood Culture, Routine No Growth to date     Blood Culture, Routine No Growth to date    Procalcitonin    Collection Time: 04/25/23 12:01 AM   Result Value Ref Range    Procalcitonin 0.84 (H) <0.25 ng/mL   COVID-19 Routine Screening    Collection Time: 04/25/23 12:10 AM   Result Value Ref Range    SARS-CoV2 (COVID-19) Qualitative PCR Not Detected Not Detected   Comprehensive Metabolic Panel (CMP)    Collection Time: 04/25/23  4:59 AM   Result Value Ref Range    Sodium 135 (L) 136 - 145 mmol/L    Potassium 4.2 3.5 - 5.1 mmol/L    Chloride 107 95 - 110 mmol/L    CO2 23 23 - 29 mmol/L    Glucose 103 70 - 110 mg/dL    BUN 15 8 - 23 mg/dL    Creatinine 0.7 0.5 - 1.4 mg/dL    Calcium 7.9 (L) 8.7 - 10.5 mg/dL    Total Protein 4.9 (L) 6.0 - 8.4 g/dL    Albumin 2.7 (L) 3.5 - 5.2 g/dL    Total Bilirubin 0.4 0.1 - 1.0 mg/dL    Alkaline Phosphatase 63 55 - 135 U/L    AST 21 10 - 40 U/L    ALT  12 10 - 44 U/L    Anion Gap 5 (L) 8 - 16 mmol/L    eGFR >60.0 >60 mL/min/1.73 m^2   Magnesium    Collection Time: 04/25/23  4:59 AM   Result Value Ref Range    Magnesium 2.3 1.6 - 2.6 mg/dL   Phosphorus    Collection Time: 04/25/23  4:59 AM   Result Value Ref Range    Phosphorus 3.2 2.7 - 4.5 mg/dL   CBC with Automated Differential    Collection Time: 04/25/23  4:59 AM   Result Value Ref Range    WBC 16.45 (H) 3.90 - 12.70 K/uL    RBC 3.66 (L) 4.00 - 5.40 M/uL    Hemoglobin 11.1 (L) 12.0 - 16.0 g/dL    Hematocrit 32.9 (L) 37.0 - 48.5 %    MCV 90 82 - 98 fL    MCH 30.3 27.0 - 31.0 pg    MCHC 33.7 32.0 - 36.0 g/dL    RDW 13.1 11.5 - 14.5 %    Platelets 162 150 - 450 K/uL    MPV 9.2 9.2 - 12.9 fL    Immature Granulocytes 0.4 0.0 - 0.5 %    Gran # (ANC) 13.4 (H) 1.8 - 7.7 K/uL    Immature Grans (Abs) 0.06 (H) 0.00 - 0.04 K/uL    Lymph # 2.3 1.0 - 4.8 K/uL    Mono # 0.6 0.3 - 1.0 K/uL    Eos # 0.0 0.0 - 0.5 K/uL    Baso # 0.04 0.00 - 0.20 K/uL    nRBC 0 0 /100 WBC    Gran % 81.6 (H) 38.0 - 73.0 %    Lymph % 13.9 (L) 18.0 - 48.0 %    Mono % 3.8 (L) 4.0 - 15.0 %    Eosinophil % 0.1 0.0 - 8.0 %    Basophil % 0.2 0.0 - 1.9 %    Differential Method Automated    Comprehensive Metabolic Panel (CMP)    Collection Time: 04/26/23  3:45 AM   Result Value Ref Range    Sodium 135 (L) 136 - 145 mmol/L    Potassium 4.8 3.5 - 5.1 mmol/L    Chloride 105 95 - 110 mmol/L    CO2 23 23 - 29 mmol/L    Glucose 125 (H) 70 - 110 mg/dL    BUN 10 8 - 23 mg/dL    Creatinine 0.7 0.5 - 1.4 mg/dL    Calcium 9.0 8.7 - 10.5 mg/dL    Total Protein 6.0 6.0 - 8.4 g/dL    Albumin 3.2 (L) 3.5 - 5.2 g/dL    Total Bilirubin 0.2 0.1 - 1.0 mg/dL    Alkaline Phosphatase 74 55 - 135 U/L    AST 16 10 - 40 U/L    ALT 14 10 - 44 U/L    Anion Gap 7 (L) 8 - 16 mmol/L    eGFR >60.0 >60 mL/min/1.73 m^2   Magnesium    Collection Time: 04/26/23  3:45 AM   Result Value Ref Range    Magnesium 2.0 1.6 - 2.6 mg/dL   Phosphorus    Collection Time: 04/26/23  3:45 AM   Result  Value Ref Range    Phosphorus 3.1 2.7 - 4.5 mg/dL   CBC with Automated Differential    Collection Time: 04/26/23  3:45 AM   Result Value Ref Range    WBC 10.66 3.90 - 12.70 K/uL    RBC 3.91 (L) 4.00 - 5.40 M/uL    Hemoglobin 11.9 (L) 12.0 - 16.0 g/dL    Hematocrit 34.9 (L) 37.0 - 48.5 %    MCV 89 82 - 98 fL    MCH 30.4 27.0 - 31.0 pg    MCHC 34.1 32.0 - 36.0 g/dL    RDW 13.1 11.5 - 14.5 %    Platelets 186 150 - 450 K/uL    MPV 9.6 9.2 - 12.9 fL    Immature Granulocytes 0.3 0.0 - 0.5 %    Gran # (ANC) 9.4 (H) 1.8 - 7.7 K/uL    Immature Grans (Abs) 0.03 0.00 - 0.04 K/uL    Lymph # 1.0 1.0 - 4.8 K/uL    Mono # 0.2 (L) 0.3 - 1.0 K/uL    Eos # 0.0 0.0 - 0.5 K/uL    Baso # 0.01 0.00 - 0.20 K/uL    nRBC 0 0 /100 WBC    Gran % 88.2 (H) 38.0 - 73.0 %    Lymph % 9.6 (L) 18.0 - 48.0 %    Mono % 1.8 (L) 4.0 - 15.0 %    Eosinophil % 0.0 0.0 - 8.0 %    Basophil % 0.1 0.0 - 1.9 %    Differential Method Automated            Pending Diagnostic Studies:     Procedure Component Value Units Date/Time    Legionella antigen, urine [[id number]] Collected: 04/25/23 0009    Order Status: Sent Lab Status: In process Updated: 04/25/23 1142    Specimen: Urine, Clean Catch          Medications:  Reconciled Home Medications:      Medication List      START taking these medications    albuterol 90 mcg/actuation inhaler  Commonly known as: VENTOLIN HFA  Inhale 2 puffs into the lungs every 6 (six) hours as needed for Wheezing. Rescue     albuterol-ipratropium 2.5 mg-0.5 mg/3 mL nebulizer solution  Commonly known as: DUO-NEB  Take 3 mLs by nebulization every 6 (six) hours as needed for Wheezing. Rescue     amoxicillin-clavulanate 875-125mg 875-125 mg per tablet  Commonly known as: AUGMENTIN  Take 1 tablet by mouth every 12 (twelve) hours. for 3 days     azithromycin 500 MG tablet  Commonly known as: ZITHROMAX  Take 1 tablet (500 mg total) by mouth once daily. for 1 day     nicotine 21 mg/24 hr  Commonly known as: NICODERM CQ  Place 1 patch onto the  skin once daily.     predniSONE 20 MG tablet  Commonly known as: DELTASONE  Take 2 tablets (40 mg total) by mouth once daily. for 3 days        CONTINUE taking these medications    amLODIPine 5 MG tablet  Commonly known as: NORVASC  TAKE 1 TABLET(5 MG) BY MOUTH EVERY DAY     dicyclomine 10 MG capsule  Commonly known as: BentyL  Take 1 capsule (10 mg total) by mouth 4 (four) times daily as needed.     ergocalciferol 50,000 unit Cap  Commonly known as: ERGOCALCIFEROL  Take 1 capsule (50,000 Units total) by mouth every 7 days.     estradioL 0.01 % (0.1 mg/gram) vaginal cream  Commonly known as: ESTRACE  Place 1 g vaginally once daily for 45 days, THEN 1 g Every 3 (three) days.  Start taking on: October 28, 2021     gabapentin 800 MG tablet  Commonly known as: NEURONTIN  Take 1 tablet (800 mg total) by mouth 3 (three) times daily.     metoprolol tartrate 50 MG tablet  Commonly known as: LOPRESSOR  Take 1 tablet (50 mg total) by mouth 2 (two) times daily.     pantoprazole 40 MG tablet  Commonly known as: PROTONIX  Take 1 tablet (40 mg total) by mouth once daily.     STIOLTO RESPIMAT 2.5-2.5 mcg/actuation Mist  Generic drug: tiotropium-olodateroL  INHALE 2 PUFFS INTO THE LUNGS EVERY DAY     sucralfate 1 gram tablet  Commonly known as: CARAFATE  TAKE 2 TABLETS(2 GRAMS) BY MOUTH EVERY DAY        ASK your doctor about these medications    famotidine 40 MG tablet  Commonly known as: PEPCID  Take 1 tablet (40 mg total) by mouth once daily.     mupirocin 2 % ointment  Commonly known as: BACTROBAN  Apply topically 3 (three) times daily.     ondansetron 4 MG tablet  Commonly known as: ZOFRAN  TAKE 1 TABLET(4 MG) BY MOUTH EVERY 6 HOURS AS NEEDED     tiZANidine 4 MG tablet  Commonly known as: ZANAFLEX  Take 2 tablets (8 mg total) by mouth 3 (three) times daily as needed.     zolpidem 10 mg Tab  Commonly known as: AMBIEN  Take 1 tablet (10 mg total) by mouth nightly as needed.            Indwelling Lines/Drains at time of discharge:    Lines/Drains/Airways     None                 Time spent on the discharge of patient: 25 minutes         Jose Alfredo Osman MD  Department of [facility] - Intensive Care

## 2023-04-28 LAB — L PNEUMO AG UR QL IA: NEGATIVE

## 2023-04-30 LAB
BACTERIA BLD CULT: NORMAL
BACTERIA BLD CULT: NORMAL

## 2023-05-05 ENCOUNTER — PATIENT OUTREACH (OUTPATIENT)
Dept: ADMINISTRATIVE | Facility: OTHER | Age: 63
End: 2023-05-05
Payer: MEDICARE

## 2023-05-05 ENCOUNTER — TELEPHONE (OUTPATIENT)
Dept: ADMINISTRATIVE | Facility: CLINIC | Age: 63
End: 2023-05-05

## 2023-05-08 ENCOUNTER — PATIENT OUTREACH (OUTPATIENT)
Dept: ADMINISTRATIVE | Facility: OTHER | Age: 63
End: 2023-05-08
Payer: MEDICARE

## 2023-05-08 NOTE — PROGRESS NOTES
CHW - Initial Contact    This Community Health Worker completed OR updated the Social Determinant of Health questionnaire with patient via telephone today.    Pt identified barriers of most importance are: Requested Smoking Cessation and Meals On Wheels. Called [facility] and Meals On Wheels has a waitlist and pt declined going to center. [facility] only cover [place] residents, so pt is not a candidate.  Referrals to community agencies completed with patient/caregiver consent outside of [facility] include: no  Referrals were put through [facility] - no  Support and Services: No support & services have been documented.  Other information discussed the patient needs / wants help with:  SDOH  Follow up required: no  No future outreach task assigned

## 2023-05-09 NOTE — PATIENT INSTRUCTIONS
She will be scheduled for upper endoscopy and esophageal dilatation.  The barium swallow shows an esophageal stricture and hiatal hernia.  She will complete her evaluation for the diarrhea.  The thyroid function and celiac disease panel were negative.  She will make a food diary and avoid the offending foods.  She continues her current medications and also her reflux regimen vitamins and minerals.  
Clear bilaterally, pupils equal, round and reactive to light.

## 2023-05-17 ENCOUNTER — PATIENT MESSAGE (OUTPATIENT)
Dept: FAMILY MEDICINE | Facility: CLINIC | Age: 63
End: 2023-05-17
Payer: MEDICARE

## 2023-06-15 DIAGNOSIS — F51.01 PRIMARY INSOMNIA: ICD-10-CM

## 2023-06-15 RX ORDER — ZOLPIDEM TARTRATE 10 MG/1
10 TABLET ORAL NIGHTLY PRN
Qty: 30 TABLET | Refills: 0 | Status: SHIPPED | OUTPATIENT
Start: 2023-06-15 | End: 2023-07-09 | Stop reason: SDUPTHER

## 2023-06-15 NOTE — TELEPHONE ENCOUNTER
Care Due:                  Date            Visit Type   Department     Provider  --------------------------------------------------------------------------------    Last Visit: None Found      None         None Found  Next Visit: None Scheduled  None         None Found                                                            Last  Test          Frequency    Reason                     Performed    Due Date  --------------------------------------------------------------------------------    Office Visit  12 months..  famotidine, metoprolol,    Not Found    Overdue                             pantoprazole,                             tiotropium-olodateroL....    [employer] Embedded Care Due Messages. Reference number: [id number].   6/15/2023 10:16:43 AM TONJAT

## 2023-06-15 NOTE — TELEPHONE ENCOUNTER
Dear Dr. Gonzáles,     In the absence of Perico Tong MD, can you please address this patients concern or request?    Thank you,  Yola Samuel LPN

## 2023-07-09 DIAGNOSIS — F51.01 PRIMARY INSOMNIA: ICD-10-CM

## 2023-07-10 DIAGNOSIS — M62.838 MASSETER MUSCLE SPASM: ICD-10-CM

## 2023-07-10 NOTE — TELEPHONE ENCOUNTER
No care due was identified.  [facility] Embedded Care Due Messages. Reference number: [id number].   7/10/2023 5:36:13 PM CDT

## 2023-07-11 RX ORDER — ZOLPIDEM TARTRATE 10 MG/1
10 TABLET ORAL NIGHTLY PRN
Qty: 30 TABLET | Refills: 0 | Status: SHIPPED | OUTPATIENT
Start: 2023-07-11 | End: 2024-01-04

## 2023-07-11 RX ORDER — TIZANIDINE 4 MG/1
8 TABLET ORAL 3 TIMES DAILY PRN
Qty: 180 TABLET | Refills: 2 | Status: SHIPPED | OUTPATIENT
Start: 2023-07-11 | End: 2024-01-04 | Stop reason: SDUPTHER

## 2023-07-13 ENCOUNTER — TELEPHONE (OUTPATIENT)
Dept: FAMILY MEDICINE | Facility: CLINIC | Age: 63
End: 2023-07-13
Payer: MEDICARE

## 2023-07-13 NOTE — TELEPHONE ENCOUNTER
----- Message from Danae Islas sent at 7/13/2023 12:14 PM CDT -----  Contact: Callie  Type: Needs Medical Advice    Who Called: Denise Tucker  Best Call Back Number : [phone]  Additional  Information: Requesting a call  back to get a PA faxed to [facility] FAX# [phone] for RX albuterol-ipratropium (DUO-NEB) 2.5 mg-0.5 mg/3 mL nebulizer solution 75 mL . If not provider can prescribe Anoro 2.5 or Bevespi 2.5  Please Advise- Thank you

## 2023-07-31 PROBLEM — A41.9 SEPSIS DUE TO PNEUMONIA: Status: RESOLVED | Noted: 2023-04-24 | Resolved: 2023-07-31

## 2023-07-31 PROBLEM — J18.9 SEPSIS DUE TO PNEUMONIA: Status: RESOLVED | Noted: 2023-04-24 | Resolved: 2023-07-31

## 2023-08-08 DIAGNOSIS — J44.9 CHRONIC OBSTRUCTIVE PULMONARY DISEASE, UNSPECIFIED COPD TYPE: ICD-10-CM

## 2023-08-09 RX ORDER — TIOTROPIUM BROMIDE AND OLODATEROL 3.124; 2.736 UG/1; UG/1
SPRAY, METERED RESPIRATORY (INHALATION)
Qty: 12 G | Refills: 11 | Status: SHIPPED | OUTPATIENT
Start: 2023-08-09 | End: 2024-01-04 | Stop reason: SDUPTHER

## 2023-08-09 NOTE — TELEPHONE ENCOUNTER
No care due was identified.  [facility] Embedded Care Due Messages. Reference number: [id number].   8/08/2023 8:49:53 PM CDT

## 2023-08-14 ENCOUNTER — PATIENT MESSAGE (OUTPATIENT)
Dept: FAMILY MEDICINE | Facility: CLINIC | Age: 63
End: 2023-08-14
Payer: MEDICARE

## 2023-09-19 ENCOUNTER — PATIENT MESSAGE (OUTPATIENT)
Dept: FAMILY MEDICINE | Facility: CLINIC | Age: 63
End: 2023-09-19
Payer: MEDICARE

## 2023-10-03 ENCOUNTER — PATIENT MESSAGE (OUTPATIENT)
Dept: ADMINISTRATIVE | Facility: HOSPITAL | Age: 63
End: 2023-10-03
Payer: MEDICARE

## 2023-10-17 ENCOUNTER — PATIENT MESSAGE (OUTPATIENT)
Dept: ADMINISTRATIVE | Facility: HOSPITAL | Age: 63
End: 2023-10-17
Payer: MEDICARE

## 2024-01-04 ENCOUNTER — OFFICE VISIT (OUTPATIENT)
Dept: FAMILY MEDICINE | Facility: CLINIC | Age: 64
End: 2024-01-04
Payer: MEDICARE

## 2024-01-04 VITALS
HEART RATE: 53 BPM | DIASTOLIC BLOOD PRESSURE: 82 MMHG | BODY MASS INDEX: 24.36 KG/M2 | OXYGEN SATURATION: 97 % | HEIGHT: 64 IN | SYSTOLIC BLOOD PRESSURE: 130 MMHG | WEIGHT: 142.69 LBS

## 2024-01-04 DIAGNOSIS — E55.9 VITAMIN D DEFICIENCY: ICD-10-CM

## 2024-01-04 DIAGNOSIS — R11.0 NAUSEA: ICD-10-CM

## 2024-01-04 DIAGNOSIS — Z12.31 SCREENING MAMMOGRAM FOR BREAST CANCER: Primary | ICD-10-CM

## 2024-01-04 DIAGNOSIS — J44.9 CHRONIC OBSTRUCTIVE PULMONARY DISEASE, UNSPECIFIED COPD TYPE: ICD-10-CM

## 2024-01-04 DIAGNOSIS — Z86.69 H/O CARPAL TUNNEL SYNDROME: ICD-10-CM

## 2024-01-04 DIAGNOSIS — I10 ESSENTIAL HYPERTENSION: ICD-10-CM

## 2024-01-04 DIAGNOSIS — M62.838 MASSETER MUSCLE SPASM: ICD-10-CM

## 2024-01-04 DIAGNOSIS — K21.9 GASTROESOPHAGEAL REFLUX DISEASE, UNSPECIFIED WHETHER ESOPHAGITIS PRESENT: ICD-10-CM

## 2024-01-04 PROCEDURE — 3075F SYST BP GE 130 - 139MM HG: CPT | Mod: CPTII,S$GLB,, | Performed by: FAMILY MEDICINE

## 2024-01-04 PROCEDURE — 99214 OFFICE O/P EST MOD 30 MIN: CPT | Mod: S$GLB,,, | Performed by: FAMILY MEDICINE

## 2024-01-04 PROCEDURE — 1159F MED LIST DOCD IN RCRD: CPT | Mod: CPTII,S$GLB,, | Performed by: FAMILY MEDICINE

## 2024-01-04 PROCEDURE — 3008F BODY MASS INDEX DOCD: CPT | Mod: CPTII,S$GLB,, | Performed by: FAMILY MEDICINE

## 2024-01-04 PROCEDURE — 3079F DIAST BP 80-89 MM HG: CPT | Mod: CPTII,S$GLB,, | Performed by: FAMILY MEDICINE

## 2024-01-04 PROCEDURE — 99999 PR PBB SHADOW E&M-EST. PATIENT-LVL III: CPT | Mod: PBBFAC,,, | Performed by: FAMILY MEDICINE

## 2024-01-04 PROCEDURE — 1160F RVW MEDS BY RX/DR IN RCRD: CPT | Mod: CPTII,S$GLB,, | Performed by: FAMILY MEDICINE

## 2024-01-04 RX ORDER — DICYCLOMINE HYDROCHLORIDE 10 MG/1
10 CAPSULE ORAL 4 TIMES DAILY PRN
Qty: 120 CAPSULE | Refills: 3 | Status: SHIPPED | OUTPATIENT
Start: 2024-01-04

## 2024-01-04 RX ORDER — METHADONE HYDROCHLORIDE 10 MG/ML
6.5 CONCENTRATE ORAL DAILY
COMMUNITY

## 2024-01-04 RX ORDER — METOPROLOL TARTRATE 50 MG/1
50 TABLET ORAL 2 TIMES DAILY
Qty: 180 TABLET | Refills: 3 | Status: SHIPPED | OUTPATIENT
Start: 2024-01-04

## 2024-01-04 RX ORDER — GABAPENTIN 800 MG/1
800 TABLET ORAL 3 TIMES DAILY
Qty: 270 TABLET | Refills: 1 | Status: SHIPPED | OUTPATIENT
Start: 2024-01-04

## 2024-01-04 RX ORDER — TIZANIDINE 4 MG/1
8 TABLET ORAL 3 TIMES DAILY PRN
Qty: 180 TABLET | Refills: 2 | Status: SHIPPED | OUTPATIENT
Start: 2024-01-04

## 2024-01-04 RX ORDER — PANTOPRAZOLE SODIUM 40 MG/1
40 TABLET, DELAYED RELEASE ORAL DAILY
Qty: 90 TABLET | Refills: 3 | Status: SHIPPED | OUTPATIENT
Start: 2024-01-04

## 2024-01-04 RX ORDER — FAMOTIDINE 40 MG/1
40 TABLET, FILM COATED ORAL DAILY
Qty: 90 TABLET | Refills: 3 | Status: SHIPPED | OUTPATIENT
Start: 2024-01-04 | End: 2025-01-03

## 2024-01-04 RX ORDER — AMLODIPINE BESYLATE 5 MG/1
5 TABLET ORAL DAILY
Qty: 90 TABLET | Refills: 3 | Status: SHIPPED | OUTPATIENT
Start: 2024-01-04

## 2024-01-04 RX ORDER — TIOTROPIUM BROMIDE AND OLODATEROL 3.124; 2.736 UG/1; UG/1
SPRAY, METERED RESPIRATORY (INHALATION)
Qty: 12 G | Refills: 11 | Status: SHIPPED | OUTPATIENT
Start: 2024-01-04

## 2024-01-04 RX ORDER — ONDANSETRON 4 MG/1
TABLET, FILM COATED ORAL
Qty: 20 TABLET | Refills: 11 | Status: SHIPPED | OUTPATIENT
Start: 2024-01-04

## 2024-01-04 RX ORDER — ERGOCALCIFEROL 1.25 MG/1
50000 CAPSULE ORAL
Qty: 12 CAPSULE | Refills: 3 | Status: SHIPPED | OUTPATIENT
Start: 2024-01-04

## 2024-01-04 RX ORDER — SUCRALFATE 1 G/1
TABLET ORAL
Qty: 60 TABLET | Refills: 11 | Status: SHIPPED | OUTPATIENT
Start: 2024-01-04

## 2024-01-04 NOTE — PROGRESS NOTES
Subjective:       Patient ID: Janina Klein is a 63 y.o. female.    Chief Complaint: Medication Refill    Janina Klein presents today to establish care.   They are new to me but established with [facility].  Former patient of Dr. Tong    Chronic opioid therapy with methadone clinic.     Current Outpatient Medications:  amLODIPine (NORVASC) 5 MG tablet, Take 1 tablet (5 mg total) by mouth once daily.  dicyclomine (BENTYL) 10 MG capsule, Take 1 capsule (10 mg total) by mouth 4 (four) times daily as needed (abdominal pain).  ergocalciferol (ERGOCALCIFEROL) 50,000 unit Cap, Take 1 capsule (50,000 Units total) by mouth every 7 days.  famotidine (PEPCID) 40 MG tablet, Take 1 tablet (40 mg total) by mouth once daily.  gabapentin (NEURONTIN) 800 MG tablet, Take 1 tablet (800 mg total) by mouth 3 (three) times daily.  methadone (DOLOPHINE) 10 mg/mL solution, Take 6.5 mLs by mouth once daily. Dosed by the methadone clinic - [facility]  metoprolol tartrate (LOPRESSOR) 50 MG tablet, Take 1 tablet (50 mg total) by mouth 2 (two) times daily.  ondansetron (ZOFRAN) 4 MG tablet, TAKE 1 TABLET(4 MG) BY MOUTH EVERY 6 HOURS AS NEEDED  pantoprazole (PROTONIX) 40 MG tablet, Take 1 tablet (40 mg total) by mouth once daily.  sucralfate (CARAFATE) 1 gram tablet, TAKE 2 TABLETS(2 GRAMS) BY MOUTH EVERY DAY  tiotropium-olodateroL (STIOLTO RESPIMAT) 2.5-2.5 mcg/actuation Mist, INHALE 2 PUFFS INTO THE LUNGS EVERY DAY  tiZANidine (ZANAFLEX) 4 MG tablet, Take 2 tablets (8 mg total) by mouth 3 (three) times daily as needed (muscle spasm).    No current facility-administered medications for this visit.    Patient Active Problem List:     Essential hypertension     Abdominal pain     History of abnormal Pap smear     Sleep disorder     Chronic bilateral low back pain with bilateral sciatica     Heavy cigarette smoker     Gastroesophageal reflux disease     Anxiety     Osteoarthritis of lumbar spine     Osteoarthritis of cervical spine      BMI 24.0-24.9, adult     Yousif's esophagus without dysplasia     COPD (chronic obstructive pulmonary disease)     Dyslipidemia (high LDL; low HDL)     Right upper quadrant abdominal pain     Hiatal hernia     Gastritis     Duodenitis     Esophagitis     Diverticulosis of colon     History of laparoscopic cholecystectomy     Diarrhea     History of esophageal stricture     COVID     Primary insomnia     Osteoarthritis of spine with radiculopathy, lumbar region            Review of Systems   Constitutional:  Negative for activity change, appetite change, fatigue and fever.   Respiratory:  Negative for shortness of breath.    Gastrointestinal:  Negative for abdominal pain.   Integumentary:  Negative for rash.         Objective:      Physical Exam  Vitals and nursing note reviewed.   Constitutional:       General: She is not in acute distress.     Appearance: She is not ill-appearing.   Cardiovascular:      Rate and Rhythm: Normal rate and regular rhythm.      Heart sounds: No murmur heard.  Pulmonary:      Effort: Pulmonary effort is normal.      Breath sounds: Normal breath sounds. No wheezing.   Skin:     General: Skin is warm and dry.      Findings: No rash.   Neurological:      Mental Status: She is alert.   Psychiatric:         Mood and Affect: Mood normal.         Behavior: Behavior normal.         Assessment:       1. Screening mammogram for breast cancer    2. Essential hypertension    3. H/O carpal tunnel syndrome    4. Gastroesophageal reflux disease, unspecified whether esophagitis present    5. Masseter muscle spasm    6. Vitamin D deficiency    7. Nausea    8. Chronic obstructive pulmonary disease, unspecified COPD type        Plan:       Problem List Items Addressed This Visit          Pulmonary    COPD (chronic obstructive pulmonary disease)    Relevant Medications    tiotropium-olodateroL (STIOLTO RESPIMAT) 2.5-2.5 mcg/actuation Mist       Cardiac/Vascular    Essential hypertension    Relevant  Medications    amLODIPine (NORVASC) 5 MG tablet    metoprolol tartrate (LOPRESSOR) 50 MG tablet       GI    Gastroesophageal reflux disease    Relevant Medications    sucralfate (CARAFATE) 1 gram tablet    pantoprazole (PROTONIX) 40 MG tablet     Other Visit Diagnoses       Screening mammogram for breast cancer    -  Primary    Relevant Orders    Mammo Digital Screening Bilat w/ Leon    H/O carpal tunnel syndrome        Relevant Medications    gabapentin (NEURONTIN) 800 MG tablet    Masseter muscle spasm        Relevant Medications    tiZANidine (ZANAFLEX) 4 MG tablet    Vitamin D deficiency        Relevant Medications    ergocalciferol (ERGOCALCIFEROL) 50,000 unit Cap    Nausea        Relevant Medications    ondansetron (ZOFRAN) 4 MG tablet              Chronic conditions stable. Refills as above. Orders as above. All questions answered.

## 2024-01-12 ENCOUNTER — PATIENT MESSAGE (OUTPATIENT)
Dept: FAMILY MEDICINE | Facility: CLINIC | Age: 64
End: 2024-01-12
Payer: MEDICARE

## 2024-01-12 DIAGNOSIS — F11.29 OPIOID DEPENDENCE WITH OPIOID-INDUCED DISORDER: Primary | ICD-10-CM

## 2024-01-23 ENCOUNTER — TELEPHONE (OUTPATIENT)
Dept: FAMILY MEDICINE | Facility: CLINIC | Age: 64
End: 2024-01-23
Payer: MEDICARE

## 2024-01-23 NOTE — TELEPHONE ENCOUNTER
----- Message from Ariane Nicholas sent at 1/22/2024  1:43 PM CST -----  Type:  Patient Requesting Referral    Who Called:pt    Does the patient already have the specialty appointment scheduled?:na    If yes, what is the date of that appointment?:na    Referral to What Specialty:drug addiction    Reason for Referral:drug addiction    Does the patient want the referral with a specific physician?: [facility]    Is the specialist an [facility]?:non    Patient Requesting a Response?:yes    Would the patient rather a call back or a response via [facility]? Call back    Best Call Back Number:[phone]      Additional Information: pt has been seeing them for addiction and no they are asking for a referral before they can see her. Please fax the referral to the central medicaid office  Please call Back to advise. Thanks!

## 2024-03-30 DIAGNOSIS — M62.838 MASSETER MUSCLE SPASM: ICD-10-CM

## 2024-03-30 NOTE — TELEPHONE ENCOUNTER
No care due was identified.  Health [facility] Embedded Care Due Messages. Reference number: [id number].   3/30/2024 9:32:39 AM CDT

## 2024-04-04 RX ORDER — TIZANIDINE 4 MG/1
8 TABLET ORAL 3 TIMES DAILY PRN
Qty: 180 TABLET | Refills: 2 | Status: SHIPPED | OUTPATIENT
Start: 2024-04-04

## 2024-06-05 ENCOUNTER — TELEPHONE (OUTPATIENT)
Dept: FAMILY MEDICINE | Facility: CLINIC | Age: 64
End: 2024-06-05
Payer: MEDICARE

## 2024-06-05 NOTE — TELEPHONE ENCOUNTER
Spoke with pt. Pt requesting letter from [facility] to donate plasma due to metoprolol. Please advise.

## 2024-06-05 NOTE — TELEPHONE ENCOUNTER
----- Message from Maty Saavedra sent at 6/5/2024 11:59 AM CDT -----  Regarding: advice  Type:  Needs Medical Advice    Who Called: pt    Best Call Back Number: [phone]      Additional Information: pt st that she needs a letter from the dr martinez that its ok for her to donate blood for plasma.  please call to discuss.

## 2024-06-07 NOTE — TELEPHONE ENCOUNTER
Last CBC with too low of hemoglobin to donate plasma but this is over a year old. Metoprolol is not a contraindication to donating, but Hemoglobin should be over 12.5% to donate. -Dr. DAVEY

## 2024-06-10 ENCOUNTER — OFFICE VISIT (OUTPATIENT)
Dept: FAMILY MEDICINE | Facility: CLINIC | Age: 64
End: 2024-06-10
Payer: MEDICARE

## 2024-06-10 ENCOUNTER — TELEPHONE (OUTPATIENT)
Dept: FAMILY MEDICINE | Facility: CLINIC | Age: 64
End: 2024-06-10
Payer: MEDICARE

## 2024-06-10 VITALS
WEIGHT: 134.13 LBS | BODY MASS INDEX: 22.9 KG/M2 | TEMPERATURE: 98 F | HEIGHT: 64 IN | SYSTOLIC BLOOD PRESSURE: 124 MMHG | HEART RATE: 78 BPM | DIASTOLIC BLOOD PRESSURE: 92 MMHG | OXYGEN SATURATION: 97 % | RESPIRATION RATE: 12 BRPM

## 2024-06-10 DIAGNOSIS — E78.5 DYSLIPIDEMIA (HIGH LDL; LOW HDL): ICD-10-CM

## 2024-06-10 DIAGNOSIS — I10 ESSENTIAL HYPERTENSION: Primary | ICD-10-CM

## 2024-06-10 DIAGNOSIS — J43.2 CENTRILOBULAR EMPHYSEMA: ICD-10-CM

## 2024-06-10 DIAGNOSIS — Z52.008 PLASMA DONOR: ICD-10-CM

## 2024-06-10 DIAGNOSIS — E55.9 VITAMIN D DEFICIENCY: ICD-10-CM

## 2024-06-10 DIAGNOSIS — Z79.899 HIGH RISK MEDICATION USE: ICD-10-CM

## 2024-06-10 DIAGNOSIS — F11.29 OPIOID DEPENDENCE WITH OPIOID-INDUCED DISORDER: ICD-10-CM

## 2024-06-10 DIAGNOSIS — G89.4 CHRONIC PAIN SYNDROME: ICD-10-CM

## 2024-06-10 DIAGNOSIS — M47.26 OSTEOARTHRITIS OF SPINE WITH RADICULOPATHY, LUMBAR REGION: ICD-10-CM

## 2024-06-10 PROCEDURE — 3074F SYST BP LT 130 MM HG: CPT | Mod: CPTII,S$GLB,, | Performed by: NURSE PRACTITIONER

## 2024-06-10 PROCEDURE — 3008F BODY MASS INDEX DOCD: CPT | Mod: CPTII,S$GLB,, | Performed by: NURSE PRACTITIONER

## 2024-06-10 PROCEDURE — 1159F MED LIST DOCD IN RCRD: CPT | Mod: CPTII,S$GLB,, | Performed by: NURSE PRACTITIONER

## 2024-06-10 PROCEDURE — 3080F DIAST BP >= 90 MM HG: CPT | Mod: CPTII,S$GLB,, | Performed by: NURSE PRACTITIONER

## 2024-06-10 PROCEDURE — 99214 OFFICE O/P EST MOD 30 MIN: CPT | Mod: S$GLB,,, | Performed by: NURSE PRACTITIONER

## 2024-06-10 PROCEDURE — 99999 PR PBB SHADOW E&M-EST. PATIENT-LVL IV: CPT | Mod: PBBFAC,,, | Performed by: NURSE PRACTITIONER

## 2024-06-11 ENCOUNTER — LAB VISIT (OUTPATIENT)
Dept: LAB | Facility: HOSPITAL | Age: 64
End: 2024-06-11
Attending: NURSE PRACTITIONER
Payer: MEDICARE

## 2024-06-11 DIAGNOSIS — J43.2 CENTRILOBULAR EMPHYSEMA: ICD-10-CM

## 2024-06-11 DIAGNOSIS — Z52.008 PLASMA DONOR: ICD-10-CM

## 2024-06-11 DIAGNOSIS — I10 ESSENTIAL HYPERTENSION: ICD-10-CM

## 2024-06-11 DIAGNOSIS — E55.9 VITAMIN D DEFICIENCY: ICD-10-CM

## 2024-06-11 DIAGNOSIS — Z79.899 HIGH RISK MEDICATION USE: ICD-10-CM

## 2024-06-11 DIAGNOSIS — E78.5 DYSLIPIDEMIA (HIGH LDL; LOW HDL): ICD-10-CM

## 2024-06-11 LAB
25(OH)D3+25(OH)D2 SERPL-MCNC: 22 NG/ML (ref 30–96)
ALBUMIN SERPL BCP-MCNC: 3.2 G/DL (ref 3.5–5.2)
ALP SERPL-CCNC: 57 U/L (ref 55–135)
ALT SERPL W/O P-5'-P-CCNC: <5 U/L (ref 10–44)
ANION GAP SERPL CALC-SCNC: 8 MMOL/L (ref 8–16)
AST SERPL-CCNC: 15 U/L (ref 10–40)
BASOPHILS # BLD AUTO: 0.05 K/UL (ref 0–0.2)
BASOPHILS NFR BLD: 0.7 % (ref 0–1.9)
BILIRUB SERPL-MCNC: 0.3 MG/DL (ref 0.1–1)
BUN SERPL-MCNC: 16 MG/DL (ref 8–23)
CALCIUM SERPL-MCNC: 9.2 MG/DL (ref 8.7–10.5)
CHLORIDE SERPL-SCNC: 104 MMOL/L (ref 95–110)
CHOLEST SERPL-MCNC: 135 MG/DL (ref 120–199)
CHOLEST/HDLC SERPL: 4 {RATIO} (ref 2–5)
CO2 SERPL-SCNC: 25 MMOL/L (ref 23–29)
CREAT SERPL-MCNC: 0.8 MG/DL (ref 0.5–1.4)
DIFFERENTIAL METHOD BLD: ABNORMAL
EOSINOPHIL # BLD AUTO: 0.1 K/UL (ref 0–0.5)
EOSINOPHIL NFR BLD: 2 % (ref 0–8)
ERYTHROCYTE [DISTWIDTH] IN BLOOD BY AUTOMATED COUNT: 13.2 % (ref 11.5–14.5)
EST. GFR  (NO RACE VARIABLE): >60 ML/MIN/1.73 M^2
GLUCOSE SERPL-MCNC: 79 MG/DL (ref 70–110)
HCT VFR BLD AUTO: 41.7 % (ref 37–48.5)
HDLC SERPL-MCNC: 34 MG/DL (ref 40–75)
HDLC SERPL: 25.2 % (ref 20–50)
HGB BLD-MCNC: 13.7 G/DL (ref 12–16)
IMM GRANULOCYTES # BLD AUTO: 0.02 K/UL (ref 0–0.04)
IMM GRANULOCYTES NFR BLD AUTO: 0.3 % (ref 0–0.5)
LDLC SERPL CALC-MCNC: 80.2 MG/DL (ref 63–159)
LYMPHOCYTES # BLD AUTO: 1.8 K/UL (ref 1–4.8)
LYMPHOCYTES NFR BLD: 25.5 % (ref 18–48)
MCH RBC QN AUTO: 29.3 PG (ref 27–31)
MCHC RBC AUTO-ENTMCNC: 32.9 G/DL (ref 32–36)
MCV RBC AUTO: 89 FL (ref 82–98)
MONOCYTES # BLD AUTO: 0.3 K/UL (ref 0.3–1)
MONOCYTES NFR BLD: 4.1 % (ref 4–15)
NEUTROPHILS # BLD AUTO: 4.7 K/UL (ref 1.8–7.7)
NEUTROPHILS NFR BLD: 67.4 % (ref 38–73)
NONHDLC SERPL-MCNC: 101 MG/DL
NRBC BLD-RTO: 0 /100 WBC
PLATELET # BLD AUTO: 182 K/UL (ref 150–450)
PMV BLD AUTO: 9.1 FL (ref 9.2–12.9)
POTASSIUM SERPL-SCNC: 4.3 MMOL/L (ref 3.5–5.1)
PROT SERPL-MCNC: 5.6 G/DL (ref 6–8.4)
RBC # BLD AUTO: 4.68 M/UL (ref 4–5.4)
SODIUM SERPL-SCNC: 137 MMOL/L (ref 136–145)
TRIGL SERPL-MCNC: 104 MG/DL (ref 30–150)
WBC # BLD AUTO: 6.99 K/UL (ref 3.9–12.7)

## 2024-06-11 PROCEDURE — 36415 COLL VENOUS BLD VENIPUNCTURE: CPT | Performed by: NURSE PRACTITIONER

## 2024-06-11 PROCEDURE — 80053 COMPREHEN METABOLIC PANEL: CPT | Performed by: NURSE PRACTITIONER

## 2024-06-11 PROCEDURE — 82306 VITAMIN D 25 HYDROXY: CPT | Performed by: NURSE PRACTITIONER

## 2024-06-11 PROCEDURE — 80061 LIPID PANEL: CPT | Performed by: NURSE PRACTITIONER

## 2024-06-11 PROCEDURE — 85025 COMPLETE CBC W/AUTO DIFF WBC: CPT | Performed by: NURSE PRACTITIONER

## 2024-06-21 ENCOUNTER — HOSPITAL ENCOUNTER (EMERGENCY)
Facility: HOSPITAL | Age: 64
Discharge: HOME | End: 2024-06-21
Attending: EMERGENCY MEDICINE
Payer: MEDICARE

## 2024-06-21 ENCOUNTER — TELEPHONE (OUTPATIENT)
Dept: FAMILY MEDICINE | Facility: CLINIC | Age: 64
End: 2024-06-21
Payer: MEDICARE

## 2024-06-21 VITALS
OXYGEN SATURATION: 98 % | RESPIRATION RATE: 17 BRPM | DIASTOLIC BLOOD PRESSURE: 92 MMHG | SYSTOLIC BLOOD PRESSURE: 142 MMHG | TEMPERATURE: 98 F | HEIGHT: 63 IN | WEIGHT: 130 LBS | BODY MASS INDEX: 23.04 KG/M2 | HEART RATE: 77 BPM

## 2024-06-21 DIAGNOSIS — F11.93 METHADONE WITHDRAWAL: Primary | ICD-10-CM

## 2024-06-21 PROCEDURE — 99283 EMERGENCY DEPT VISIT LOW MDM: CPT

## 2024-06-21 PROCEDURE — 25000003 PHARM REV CODE 250

## 2024-06-21 RX ORDER — METHADONE HYDROCHLORIDE 10 MG/1
60 TABLET ORAL DAILY
Status: DISCONTINUED | OUTPATIENT
Start: 2024-06-21 | End: 2024-06-21

## 2024-06-21 RX ORDER — METHADONE HYDROCHLORIDE 10 MG/1
TABLET ORAL
Status: COMPLETED
Start: 2024-06-21 | End: 2024-06-21

## 2024-06-21 RX ORDER — METHADONE HYDROCHLORIDE 10 MG/1
60 TABLET ORAL ONCE
Status: COMPLETED | OUTPATIENT
Start: 2024-06-21 | End: 2024-06-21

## 2024-06-21 RX ADMIN — METHADONE HYDROCHLORIDE 60 MG: 10 TABLET ORAL at 06:06

## 2024-06-21 NOTE — ED PROVIDER NOTES
Encounter Date: 2024       History     Chief Complaint   Patient presents with    Medication Refill     Patient requesting methadone; States she usually goes to [facility], but has not had ride to clinic , last dose was Monday of last week; All over body pain;      64-year-old female here from home via private vehicle, stating that she takes methadone 65 mg daily, and she has not been able to get it since Monday, 4 days ago..  She gets her methadone at a [facility] patient states that in the past, her sister will drive her to the clinic, but her sister passed away unexpectedly in April of this year.  Since then, patient has been dependent on other people to give her ride to the clinic.  She states her mother has a car, but mother refuses to let her drive the car to the methadone clinic, and refuses to drive the patient herself.  Patient states she was suffering from some abdominal crampy pain, and occasional loose stools, and states that she does not feel well in general.      Review of patient's allergies indicates:   Allergen Reactions    Lisinopril      Past Medical History:   Diagnosis Date    Anxiety     Arthritis     osteoarthritis    Chronic pain     Chronic pain     COPD (chronic obstructive pulmonary disease)     GERD (gastroesophageal reflux disease)     History of stomach ulcers     Hypertension     IBS (irritable bowel syndrome)     [facility] 2004    Palpitations      Past Surgical History:   Procedure Laterality Date    APPENDECTOMY       SECTION      CHOLECYSTECTOMY      COLONOSCOPY      ? results in [place]    COLONOSCOPY  2015    Dr. Farfan   diverticulosis    COLONOSCOPY N/A 2020    Procedure: COLONOSCOPY;  Surgeon: Casey Farfan MD;  Location: [facility];  Service: General;  Laterality: N/A;    CYSTOSCOPY      ESOPHAGOGASTRODUODENOSCOPY N/A 2020    Procedure: ESOPHAGOGASTRODUODENOSCOPY (EGD);  Surgeon: Casey Farfan MD;  Location:  [facility] ENDO;  Service: General;  Laterality: N/A;  WITH BXS    ESOPHAGOGASTRODUODENOSCOPY N/A 08/19/2020    Procedure: EGD W/ DILAT (ESOPHAGOGASTRODUODENOSCOPY WITH DILATION);  Surgeon: Mike Tong MD;  Location: [facility] ENDO;  Service: Endoscopy;  Laterality: N/A;    HERNIA REPAIR      HYSTERECTOMY  09/03/2014    with bso    LAPAROSCOPIC CHOLECYSTECTOMY N/A 05/26/2020    Procedure: CHOLECYSTECTOMY-LAPAROSCOPIC;  Surgeon: Casey Farfan MD;  Location: [facility] OR;  Service: General;  Laterality: N/A;    TONSILLECTOMY      TOTAL ABDOMINAL HYSTERECTOMY W/ BILATERAL SALPINGOOPHORECTOMY Bilateral      Family History   Problem Relation Name Age of Onset    Hypertension Mother Janina     Diabetes Sister Lanette/sister     Colon cancer Maternal Grandmother      Breast cancer Other GMGM      Social History     Tobacco Use    Smoking status: Every Day     Current packs/day: 0.50     Types: Cigarettes     Passive exposure: Past    Smokeless tobacco: Never   Substance Use Topics    Alcohol use: No    Drug use: Yes     Comment: methadone as prescribed     Review of Systems   Gastrointestinal:  Positive for abdominal pain and diarrhea. Negative for vomiting.   All other systems reviewed and are negative.      Physical Exam     Initial Vitals [06/21/24 0456]   BP Pulse Resp Temp SpO2   (!) 153/97 74 16 97.6 °F (36.4 °C) 99 %      MAP       --         Physical Exam    Nursing note and vitals reviewed.  Constitutional: She appears well-developed and well-nourished. She is not diaphoretic. No distress.   Patient does not appear to be in any sort of distress, and she does not appear to be acutely ill.  No tremors, no diaphoresis, does not appear to be in any sort of pain or discomfort.   HENT:   Head: Normocephalic and atraumatic.   Nose: Nose normal.   Mouth/Throat: Oropharynx is clear and moist. No oropharyngeal exudate.   Eyes: Conjunctivae and EOM are normal. Pupils are equal, round, and reactive to light. No scleral icterus.   Neck:  Neck supple. No JVD present.   Normal range of motion.  Cardiovascular:  Normal rate, regular rhythm, normal heart sounds and intact distal pulses.           No murmur heard.  Pulmonary/Chest: Breath sounds normal. No stridor. No respiratory distress. She has no wheezes. She has no rhonchi. She has no rales.   Abdominal: Abdomen is soft. Bowel sounds are normal. She exhibits no distension. There is no abdominal tenderness.   Musculoskeletal:         General: No tenderness or edema. Normal range of motion.      Cervical back: Normal range of motion and neck supple.     Neurological: She is alert and oriented to person, place, and time. She has normal strength. No cranial nerve deficit or sensory deficit. GCS score is 15. GCS eye subscore is 4. GCS verbal subscore is 5. GCS motor subscore is 6.   Skin: Skin is warm and dry. Capillary refill takes less than 2 seconds. No rash noted. No erythema.   Psychiatric: She has a normal mood and affect. Her behavior is normal.         ED Course   Procedures  Labs Reviewed - No data to display       Imaging Results    None          Medications   methadone tablet 60 mg (60 mg Oral Given 6/21/24 0605)     Medical Decision Making  Patient states she takes 65 mg methadone daily.  She states she has been out since Monday, 4 days ago.  No outward symptoms of withdrawal are noted.  Unfortunately, patient drove herself here.  I explained to her that without a ride, I can not give her any methadone or any other sedating medication.  She states that she would like to go home and get her mother to drive her here, so that she can receive a dose of methadone.     06:55--patient was returned, with the mother as a , and will be given 60 mg methadone.  Patient understands that this is not a methadone clinic and she will have to make arrangements to get her methadone somehow other than returning to the emergency department has a regular source of her methadone.  She expresses  understanding.    Risk  Prescription drug management.                                      Clinical Impression:  Final diagnoses:  [F11.93] Methadone withdrawal (Primary)          ED Disposition Condition    Discharge Stable          ED Prescriptions    None       Follow-up Information       Follow up With Specialties Details Why Contact Info    Pepper Gonzáles MD Family Medicine Call today  [address]  [address]  [place] [address] [phone]      [facility] Emergency Dept Emergency Medicine  If symptoms worsen [address] [address] [phone]             Jayesh Watts MD  06/24/24 0704

## 2024-06-21 NOTE — ED NOTES
Pt returned to ED  with responsible . Pt back in room. ID band on. Bed wheels locked, bed at lowest level and call light within reach.

## 2024-06-21 NOTE — ED NOTES
Patient requesting to leave to  elderly mother to have as designated .  Discussed with Dr. Watts and SACHIN Robison - patient allowed to leave and will return for treatment

## 2024-06-21 NOTE — DISCHARGE INSTRUCTIONS
We discussed, we do not customarily use or prescribe or dispensed methadone at this facility.  You will need to make every effort possible to arrange for some sort of transportation to your methadone clinic in the future.  Follow-up with your primary care doctor later today to discuss this.  Return here for any worsening signs or symptoms.

## 2024-06-21 NOTE — TELEPHONE ENCOUNTER
Pt seen in the ER due Methadone withdrawal.   Unable to get to [place] at the clinic that prescribes due to sister passing away.   patient states she was suffering from some abdominal crampy pain, and occasional loose stools, and states that she does not feel well in general.   Pt has upcoming appt with MD next week and wanted to inform. Informed pt MD is out of the office until next week. Pt voiced understanding.

## 2024-06-21 NOTE — TELEPHONE ENCOUNTER
----- Message from Maida Calle sent at 6/21/2024 10:45 AM CDT -----  Regarding: Call back  Type:  Needs Medical Advice    Who Called: Pt    Would the patient rather a call back or a response via [facility]? Call back    Best Call Back Number: [phone]    Additional Information: Pt is requesting a call back. Thank you

## 2024-06-25 ENCOUNTER — PATIENT MESSAGE (OUTPATIENT)
Dept: FAMILY MEDICINE | Facility: CLINIC | Age: 64
End: 2024-06-25
Payer: MEDICARE

## 2024-06-28 DIAGNOSIS — Z86.69 H/O CARPAL TUNNEL SYNDROME: ICD-10-CM

## 2024-06-28 RX ORDER — GABAPENTIN 800 MG/1
800 TABLET ORAL 3 TIMES DAILY
Qty: 270 TABLET | Refills: 1 | Status: SHIPPED | OUTPATIENT
Start: 2024-06-28

## 2024-06-28 NOTE — TELEPHONE ENCOUNTER
No care due was identified.  [facility] Embedded Care Due Messages. Reference number: [id number].   6/28/2024 5:14:06 AM CDT

## 2024-07-03 ENCOUNTER — PATIENT MESSAGE (OUTPATIENT)
Dept: FAMILY MEDICINE | Facility: CLINIC | Age: 64
End: 2024-07-03
Payer: MEDICARE

## 2024-07-05 ENCOUNTER — OFFICE VISIT (OUTPATIENT)
Dept: FAMILY MEDICINE | Facility: CLINIC | Age: 64
End: 2024-07-05
Payer: MEDICARE

## 2024-07-05 VITALS — SYSTOLIC BLOOD PRESSURE: 138 MMHG | DIASTOLIC BLOOD PRESSURE: 88 MMHG

## 2024-07-05 DIAGNOSIS — M54.41 CHRONIC BILATERAL LOW BACK PAIN WITH BILATERAL SCIATICA: ICD-10-CM

## 2024-07-05 DIAGNOSIS — I10 ESSENTIAL HYPERTENSION: ICD-10-CM

## 2024-07-05 DIAGNOSIS — F33.1 MODERATE EPISODE OF RECURRENT MAJOR DEPRESSIVE DISORDER: Primary | ICD-10-CM

## 2024-07-05 DIAGNOSIS — M47.26 OSTEOARTHRITIS OF SPINE WITH RADICULOPATHY, LUMBAR REGION: ICD-10-CM

## 2024-07-05 DIAGNOSIS — G89.29 CHRONIC BILATERAL LOW BACK PAIN WITH BILATERAL SCIATICA: ICD-10-CM

## 2024-07-05 DIAGNOSIS — M54.42 CHRONIC BILATERAL LOW BACK PAIN WITH BILATERAL SCIATICA: ICD-10-CM

## 2024-07-05 RX ORDER — DULOXETIN HYDROCHLORIDE 30 MG/1
30 CAPSULE, DELAYED RELEASE ORAL DAILY
Qty: 30 CAPSULE | Refills: 11 | Status: SHIPPED | OUTPATIENT
Start: 2024-07-05 | End: 2025-07-05

## 2024-07-05 NOTE — PROGRESS NOTES
The patient location is: [place]  The chief complaint leading to consultation is: Depression, grief, back pain    Visit type: audiovisual    Face to Face time with patient: 10 minutes  12 minutes of total time spent on the encounter, which includes face to face time and non-face to face time preparing to see the patient (eg, review of tests), Obtaining and/or reviewing separately obtained history, Documenting clinical information in the electronic or other health record, Independently interpreting results (not separately reported) and communicating results to the patient/family/caregiver, or Care coordination (not separately reported).         Each patient to whom he or she provides medical services by telemedicine is:  (1) informed of the relationship between the physician and patient and the respective role of any other health care provider with respect to management of the patient; and (2) notified that he or she may decline to receive medical services by telemedicine and may withdraw from such care at any time.    Notes:  Subjective:       Patient ID: Janina Klein is a 64 y.o. female.    Chief Complaint: No chief complaint on file.      Ms. Klein is having a rough time  Lost her sister in April  She was only in her late 50s and they don't know why she .   She just went to sleep and did not wake up.   She is having pain everywhere and is having a rough time.   She does not see an end to it but she does not have any answers either.     She has an appt with a back doctor next week because she is having a lot of back pain    She was previously going to a methadone clinic but without her sister to take her to appts.     Back Pain  This is a recurrent problem. The current episode started in the past 7 days. The problem occurs constantly. The problem is unchanged. The pain is present in the sacro-iliac. The quality of the pain is described as burning. The pain radiates to the left knee. The pain is at a  severity of 10/10. The pain is severe. The pain is The same all the time. The symptoms are aggravated by bending, position, lying down, sitting and standing. Stiffness is present All day. Associated symptoms include weakness and weight loss. Pertinent negatives include no bladder incontinence, bowel incontinence, chest pain, dysuria, fever, headaches, leg pain, numbness, paresis, paresthesias, pelvic pain, perianal numbness or tingling. The treatment provided no relief.     Review of Systems   Constitutional:  Positive for weight loss. Negative for fever.   Cardiovascular:  Negative for chest pain.   Gastrointestinal:  Negative for bowel incontinence.   Genitourinary:  Negative for bladder incontinence, dysuria, hematuria and pelvic pain.   Musculoskeletal:  Positive for back pain. Negative for leg pain.   Neurological:  Positive for weakness. Negative for tingling, numbness, headaches and paresthesias.         Objective:      Physical Exam  Constitutional:       General: She is not in acute distress.     Appearance: Normal appearance. She is not ill-appearing.   Pulmonary:      Effort: Pulmonary effort is normal. No respiratory distress.   Neurological:      Mental Status: She is alert.   Psychiatric:         Mood and Affect: Mood normal.         Behavior: Behavior normal.         Thought Content: Thought content normal.         Judgment: Judgment normal.         Assessment:       1. Moderate episode of recurrent major depressive disorder    2. Chronic bilateral low back pain with bilateral sciatica    3. Essential hypertension    4. Osteoarthritis of spine with radiculopathy, lumbar region        Plan:       Problem List Items Addressed This Visit          Neuro    Osteoarthritis of spine with radiculopathy, lumbar region     Chronic pain with upcoming appt with back doctor            Cardiac/Vascular    Essential hypertension     Fairlhy well controlled on the higher side. Increased stress right now.              Orthopedic    Chronic bilateral low back pain with bilateral sciatica     Chronic severe low back pain- soon to establish with new back doctor          Other Visit Diagnoses       Moderate episode of recurrent major depressive disorder    -  Primary    Relevant Medications    DULoxetine (CYMBALTA) 30 MG capsule

## 2024-08-05 DIAGNOSIS — M62.838 MASSETER MUSCLE SPASM: ICD-10-CM

## 2024-08-06 RX ORDER — TIZANIDINE 4 MG/1
TABLET ORAL
Qty: 180 TABLET | Refills: 2 | Status: SHIPPED | OUTPATIENT
Start: 2024-08-06

## 2024-10-15 RX ORDER — DICYCLOMINE HYDROCHLORIDE 10 MG/1
10 CAPSULE ORAL 4 TIMES DAILY PRN
Qty: 120 CAPSULE | Refills: 3 | Status: SHIPPED | OUTPATIENT
Start: 2024-10-15

## 2024-10-15 NOTE — TELEPHONE ENCOUNTER
Refill Routing Note   Medication(s) are not appropriate for processing by [facility] for the following reason(s):        Outside of protocol    [facility] action(s):  Route             Appointments  past 12m or future 3m with PCP    Date Provider   Last Visit   7/5/2024 Pepper Gonzáles MD   Next Visit   Visit date not found Pepper Gonzáles MD   ED visits in past 90 days: 0        Note composed:7:52 AM 10/15/2024

## 2024-10-15 NOTE — TELEPHONE ENCOUNTER
No care due was identified.  [facility] Embedded Care Due Messages. Reference number: [id number].   10/14/2024 10:37:38 PM CDT

## 2024-12-15 DIAGNOSIS — Z86.69 H/O CARPAL TUNNEL SYNDROME: ICD-10-CM

## 2024-12-15 NOTE — TELEPHONE ENCOUNTER
No care due was identified.  [facility] Embedded Care Due Messages. Reference number: [id number].   12/15/2024 4:23:44 PM CST

## 2024-12-16 RX ORDER — GABAPENTIN 800 MG/1
800 TABLET ORAL 3 TIMES DAILY
Qty: 270 TABLET | Refills: 1 | OUTPATIENT
Start: 2024-12-16

## 2024-12-16 NOTE — TELEPHONE ENCOUNTER
Refill Decision Note   Janina Ernie  is requesting a refill authorization.  Brief Assessment and Rationale for Refill:  Quick Discontinue     Medication Therapy Plan:         Comments:     Note composed:4:27 PM 12/16/2024

## 2024-12-18 DIAGNOSIS — Z86.69 H/O CARPAL TUNNEL SYNDROME: ICD-10-CM

## 2024-12-18 RX ORDER — GABAPENTIN 800 MG/1
800 TABLET ORAL 3 TIMES DAILY
Qty: 270 TABLET | Refills: 1 | Status: SHIPPED | OUTPATIENT
Start: 2024-12-18

## 2024-12-18 NOTE — TELEPHONE ENCOUNTER
No care due was identified.  [facility] Embedded Care Due Messages. Reference number: [id number].   12/18/2024 7:53:27 AM CST

## 2024-12-18 NOTE — TELEPHONE ENCOUNTER
Refill Routing Note   Medication(s) are not appropriate for processing by [facility] for the following reason(s):        Outside of protocol    [facility] action(s):  Route             Appointments  past 12m or future 3m with PCP    Date Provider   Last Visit   7/5/2024 Pepper Gonzáles MD   Next Visit   Visit date not found Pepper Gonzáles MD   ED visits in past 90 days: 0        Note composed:8:46 AM 12/18/2024

## 2024-12-25 DIAGNOSIS — I10 ESSENTIAL HYPERTENSION: ICD-10-CM

## 2024-12-25 DIAGNOSIS — K21.9 GASTROESOPHAGEAL REFLUX DISEASE, UNSPECIFIED WHETHER ESOPHAGITIS PRESENT: ICD-10-CM

## 2024-12-25 NOTE — TELEPHONE ENCOUNTER
No care due was identified.  [facility] Embedded Care Due Messages. Reference number: [id number].   12/25/2024 5:15:09 AM CST

## 2024-12-26 RX ORDER — SUCRALFATE 1 G/1
TABLET ORAL
Qty: 60 TABLET | Refills: 1 | Status: SHIPPED | OUTPATIENT
Start: 2024-12-26

## 2024-12-26 RX ORDER — AMLODIPINE BESYLATE 5 MG/1
5 TABLET ORAL
Qty: 90 TABLET | Refills: 1 | Status: SHIPPED | OUTPATIENT
Start: 2024-12-26

## 2024-12-26 RX ORDER — METOPROLOL TARTRATE 50 MG/1
50 TABLET ORAL 2 TIMES DAILY
Qty: 180 TABLET | Refills: 1 | Status: SHIPPED | OUTPATIENT
Start: 2024-12-26

## 2024-12-26 NOTE — TELEPHONE ENCOUNTER
Refill Routing Note   Medication(s) are not appropriate for processing by [facility] for the following reason(s):        Outside of protocol    [facility] action(s):  Route  Approve               Appointments  past 12m or future 3m with PCP    Date Provider   Last Visit   7/5/2024 Pepper Gonzáles MD   Next Visit   Visit date not found Pepper Gonzáles MD   ED visits in past 90 days: 0        Note composed:10:53 AM 12/26/2024

## 2024-12-29 ENCOUNTER — HOSPITAL ENCOUNTER (EMERGENCY)
Facility: HOSPITAL | Age: 64
Discharge: HOME | End: 2024-12-29
Attending: EMERGENCY MEDICINE
Payer: MEDICARE

## 2024-12-29 VITALS
WEIGHT: 130 LBS | BODY MASS INDEX: 23.04 KG/M2 | DIASTOLIC BLOOD PRESSURE: 91 MMHG | RESPIRATION RATE: 16 BRPM | HEART RATE: 76 BPM | HEIGHT: 63 IN | TEMPERATURE: 99 F | OXYGEN SATURATION: 99 % | SYSTOLIC BLOOD PRESSURE: 175 MMHG

## 2024-12-29 DIAGNOSIS — J10.1 INFLUENZA A: Primary | ICD-10-CM

## 2024-12-29 LAB
GROUP A STREP, MOLECULAR: NEGATIVE
INFLUENZA A, MOLECULAR: POSITIVE
INFLUENZA B, MOLECULAR: NEGATIVE
SARS-COV-2 RDRP RESP QL NAA+PROBE: NEGATIVE
SPECIMEN SOURCE: ABNORMAL

## 2024-12-29 PROCEDURE — 87635 SARS-COV-2 COVID-19 AMP PRB: CPT | Performed by: EMERGENCY MEDICINE

## 2024-12-29 PROCEDURE — 87651 STREP A DNA AMP PROBE: CPT | Performed by: NURSE PRACTITIONER

## 2024-12-29 PROCEDURE — 87502 INFLUENZA DNA AMP PROBE: CPT | Performed by: EMERGENCY MEDICINE

## 2024-12-29 PROCEDURE — 99284 EMERGENCY DEPT VISIT MOD MDM: CPT

## 2024-12-29 RX ORDER — OSELTAMIVIR PHOSPHATE 75 MG/1
75 CAPSULE ORAL 2 TIMES DAILY
Qty: 10 CAPSULE | Refills: 0 | Status: SHIPPED | OUTPATIENT
Start: 2024-12-29 | End: 2025-01-03

## 2024-12-29 RX ORDER — ONDANSETRON 4 MG/1
4 TABLET, FILM COATED ORAL EVERY 6 HOURS PRN
Qty: 30 TABLET | Refills: 0 | Status: SHIPPED | OUTPATIENT
Start: 2024-12-29

## 2024-12-29 RX ORDER — BENZONATATE 200 MG/1
200 CAPSULE ORAL 3 TIMES DAILY PRN
Qty: 30 CAPSULE | Refills: 0 | Status: SHIPPED | OUTPATIENT
Start: 2024-12-29

## 2024-12-29 NOTE — ED NOTES
See triage notes.     Pain:  Rated 8/10.     Psychosocial:  Patient is calm and cooperative.  Patients insight and judgement are appropriate to situation.  Appears clean, well maintained, with clothing appropriate to environment.  No evidence of delusions, hallucinations, or psychosis.     Neuro:  Eyes open spontaneously.  Awake, alert, oriented x 4.  Speech clear and appropriate.  Tolerating saliva secretions well.  Able to follow commands, demonstrating ability to actively and appropriately communicate within context of current conversation.  Symmetrical facial muscles.  Moving all extremities well with no noted weakness.  Adequate muscle tone present.  Movement is purposeful.         Airway:  Bilateral chest rise and fall.  RR regular and non-labored.         Circulatory:  Skin warm, dry, and pink.  Capillary refill/skin blanching less than 3 seconds to distal of upper extremities.     Extremities:  No redness, heat, swelling, deformity, or pain.     Skin:  Intact with no bruising/discolorations noted.

## 2024-12-29 NOTE — ED PROVIDER NOTES
Encounter Date: 2024       History     Chief Complaint   Patient presents with    Cough     Patient vomited two days ago.  Has felt weak for two days.  Today woke with fever, cough, light green sputum production, headache, and body aches.  Tylenol LD at 1030 today.     POV the ED alone.  Patient complains of cough and congestion with chills, body aches, runny nose for 2 days.  States she did have vomiting 2 days ago.  She had took Tylenol at 10:30 a.m. prior to arrival.  She denies chest pain or SOB.  No abdominal pain.  History of COPD.  States she has plenty of COPD medication at home.  No other complaints    The history is provided by the patient. No  was used.     Review of patient's allergies indicates:   Allergen Reactions    Lisinopril      Past Medical History:   Diagnosis Date    Anxiety     Arthritis     osteoarthritis    Chronic pain     Chronic pain     COPD (chronic obstructive pulmonary disease)     GERD (gastroesophageal reflux disease)     History of stomach ulcers     Hypertension     IBS (irritable bowel syndrome)     [facility] 2004    Palpitations      Past Surgical History:   Procedure Laterality Date    APPENDECTOMY       SECTION      CHOLECYSTECTOMY      COLONOSCOPY      ? results in [place]    COLONOSCOPY  2015    Dr. Farfan   diverticulosis    COLONOSCOPY N/A 2020    Procedure: COLONOSCOPY;  Surgeon: Casey Farfan MD;  Location: [facility];  Service: General;  Laterality: N/A;    CYSTOSCOPY      ESOPHAGOGASTRODUODENOSCOPY N/A 2020    Procedure: ESOPHAGOGASTRODUODENOSCOPY (EGD);  Surgeon: Casey Farfan MD;  Location: [facility];  Service: General;  Laterality: N/A;  WITH BXS    ESOPHAGOGASTRODUODENOSCOPY N/A 2020    Procedure: EGD W/ DILAT (ESOPHAGOGASTRODUODENOSCOPY WITH DILATION);  Surgeon: Mike Tong MD;  Location: [facility];  Service: Endoscopy;  Laterality: N/A;    HERNIA REPAIR      HYSTERECTOMY   09/03/2014    with bso    LAPAROSCOPIC CHOLECYSTECTOMY N/A 05/26/2020    Procedure: CHOLECYSTECTOMY-LAPAROSCOPIC;  Surgeon: Casey Farfan MD;  Location: [facility] OR;  Service: General;  Laterality: N/A;    TONSILLECTOMY      TOTAL ABDOMINAL HYSTERECTOMY W/ BILATERAL SALPINGOOPHORECTOMY Bilateral      Family History   Problem Relation Name Age of Onset    Hypertension Mother Janina     Diabetes Sister Lanette/sister     Colon cancer Maternal Grandmother      Breast cancer Other GMGM      Social History     Tobacco Use    Smoking status: Every Day     Current packs/day: 0.50     Types: Cigarettes     Passive exposure: Past    Smokeless tobacco: Never   Substance Use Topics    Alcohol use: No    Drug use: Yes     Comment: methadone as prescribed     Review of Systems   Constitutional:  Positive for chills, fatigue and fever.   HENT:  Positive for congestion and rhinorrhea.    Respiratory:  Positive for cough. Negative for shortness of breath.    Cardiovascular:  Negative for chest pain.   Gastrointestinal:  Positive for nausea and vomiting. Negative for abdominal pain and diarrhea.   Genitourinary:  Negative for dysuria.   Musculoskeletal:  Positive for myalgias.   Neurological:  Positive for headaches.   All other systems reviewed and are negative.      Physical Exam     Initial Vitals [12/29/24 1243]   BP Pulse Resp Temp SpO2   (!) 175/91 76 16 98.5 °F (36.9 °C) 99 %      MAP       --         Physical Exam    Nursing note and vitals reviewed.  Constitutional: She appears well-developed and well-nourished. No distress.   HENT:   Head: Normocephalic and atraumatic. Mouth/Throat: Oropharynx is clear and moist.   Postnasal drainage noted, posterior pharynx erythema without swelling.  No exudate   Eyes: Pupils are equal, round, and reactive to light.   Neck:   Normal range of motion.  Cardiovascular:  Normal rate and regular rhythm.           Pulmonary/Chest: Breath sounds normal. No respiratory distress. She has no wheezes.    Abdominal: Abdomen is soft.   Musculoskeletal:         General: Normal range of motion.      Cervical back: Normal range of motion.     Neurological: She is alert and oriented to person, place, and time. GCS score is 15. GCS eye subscore is 4. GCS verbal subscore is 5. GCS motor subscore is 6.   Skin: Skin is warm and dry. Capillary refill takes less than 2 seconds.   Psychiatric: She has a normal mood and affect. Thought content normal.         ED Course   Procedures  Labs Reviewed   INFLUENZA A & B BY MOLECULAR - Abnormal       Result Value    Influenza A, Molecular Positive (*)     Influenza B, Molecular Negative      Flu A & B Source Nasal Swab     GROUP A STREP, MOLECULAR    Group A Strep, Molecular Negative     SARS-COV-2 RNA AMPLIFICATION, QUAL    SARS-CoV-2 RNA, Amplification, Qual Negative            Imaging Results    None          Medications - No data to display  Medical Decision Making  Presents for evaluation of flu-like illness, see HPI  Differentials include but not limited to viral illness, bacterial illness, otitis, sinusitis, bronchitis  Discharged home, diagnosis influenza a.  Prescriptions for home use.  Instructed to Rest, increase fluids lots of water and liquids.  Tylenol and or Motrin as needed contraindicated.  Call clinic for office recheck.  Return as needed. She agrees with plan of care    Amount and/or Complexity of Data Reviewed  Labs: ordered. Decision-making details documented in ED Course.    Risk  OTC drugs.  Prescription drug management.                                      Clinical Impression:  Final diagnoses:  [J10.1] Influenza A (Primary)          ED Disposition Condition    Discharge Stable          ED Prescriptions       Medication Sig Dispense Start Date End Date Auth. Provider    oseltamivir (TAMIFLU) 75 MG capsule Take 1 capsule (75 mg total) by mouth 2 (two) times daily. for 5 days 10 capsule 12/29/2024 1/3/2025 Adia Prescott NP    ondansetron (ZOFRAN) 4 MG  tablet Take 1 tablet (4 mg total) by mouth every 6 (six) hours as needed for Nausea. 30 tablet 12/29/2024 -- Adia Prescott NP    benzonatate (TESSALON) 200 MG capsule Take 1 capsule (200 mg total) by mouth 3 (three) times daily as needed for Cough. 30 capsule 12/29/2024 -- Adia Prescott NP          Follow-up Information       Follow up With Specialties Details Why Contact Info    Pepper Gonzáles MD Family Medicine Call in 2 days  [address]  [address]  [place] [address] [phone]               Adia Prescott NP  12/29/24 4989

## 2024-12-29 NOTE — DISCHARGE INSTRUCTIONS
Rest, increase fluids lots of water and liquids.  Tylenol and or Motrin as needed contraindicated.  Call clinic for office recheck.  Return as needed

## 2025-01-12 ENCOUNTER — HOSPITAL ENCOUNTER (EMERGENCY)
Facility: HOSPITAL | Age: 65
Discharge: HOME | End: 2025-01-12
Attending: STUDENT IN AN ORGANIZED HEALTH CARE EDUCATION/TRAINING PROGRAM
Payer: MEDICARE

## 2025-01-12 VITALS
WEIGHT: 130 LBS | SYSTOLIC BLOOD PRESSURE: 174 MMHG | RESPIRATION RATE: 19 BRPM | TEMPERATURE: 98 F | DIASTOLIC BLOOD PRESSURE: 103 MMHG | HEIGHT: 63 IN | OXYGEN SATURATION: 97 % | HEART RATE: 76 BPM | BODY MASS INDEX: 23.04 KG/M2

## 2025-01-12 DIAGNOSIS — R07.89 ATYPICAL CHEST PAIN: ICD-10-CM

## 2025-01-12 DIAGNOSIS — R07.9 CHEST PAIN: ICD-10-CM

## 2025-01-12 DIAGNOSIS — M79.602 PAIN OF LEFT UPPER EXTREMITY: Primary | ICD-10-CM

## 2025-01-12 LAB
ALBUMIN SERPL BCP-MCNC: 3.9 G/DL (ref 3.5–5.2)
ALP SERPL-CCNC: 96 U/L (ref 40–150)
ALT SERPL W/O P-5'-P-CCNC: 12 U/L (ref 10–44)
ANION GAP SERPL CALC-SCNC: 13 MMOL/L (ref 8–16)
AST SERPL-CCNC: 18 U/L (ref 10–40)
BASOPHILS # BLD AUTO: 0.05 K/UL (ref 0–0.2)
BASOPHILS NFR BLD: 0.4 % (ref 0–1.9)
BILIRUB SERPL-MCNC: 0.2 MG/DL (ref 0.1–1)
BNP SERPL-MCNC: 18 PG/ML (ref 0–99)
BUN SERPL-MCNC: 13 MG/DL (ref 8–23)
CALCIUM SERPL-MCNC: 10.3 MG/DL (ref 8.7–10.5)
CHLORIDE SERPL-SCNC: 102 MMOL/L (ref 95–110)
CO2 SERPL-SCNC: 26 MMOL/L (ref 23–29)
CREAT SERPL-MCNC: 1 MG/DL (ref 0.5–1.4)
D DIMER PPP IA.FEU-MCNC: 0.41 MG/L FEU
DIFFERENTIAL METHOD BLD: ABNORMAL
EOSINOPHIL # BLD AUTO: 0 K/UL (ref 0–0.5)
EOSINOPHIL NFR BLD: 0.3 % (ref 0–8)
ERYTHROCYTE [DISTWIDTH] IN BLOOD BY AUTOMATED COUNT: 13.1 % (ref 11.5–14.5)
EST. GFR  (NO RACE VARIABLE): >60 ML/MIN/1.73 M^2
GLUCOSE SERPL-MCNC: 95 MG/DL (ref 70–110)
HCT VFR BLD AUTO: 41 % (ref 37–48.5)
HGB BLD-MCNC: 13.5 G/DL (ref 12–16)
IMM GRANULOCYTES # BLD AUTO: 0.06 K/UL (ref 0–0.04)
IMM GRANULOCYTES NFR BLD AUTO: 0.5 % (ref 0–0.5)
LYMPHOCYTES # BLD AUTO: 1.9 K/UL (ref 1–4.8)
LYMPHOCYTES NFR BLD: 14.9 % (ref 18–48)
MCH RBC QN AUTO: 30.3 PG (ref 27–31)
MCHC RBC AUTO-ENTMCNC: 32.9 G/DL (ref 32–36)
MCV RBC AUTO: 92 FL (ref 82–98)
MONOCYTES # BLD AUTO: 0.6 K/UL (ref 0.3–1)
MONOCYTES NFR BLD: 4.8 % (ref 4–15)
NEUTROPHILS # BLD AUTO: 10 K/UL (ref 1.8–7.7)
NEUTROPHILS NFR BLD: 79.1 % (ref 38–73)
NRBC BLD-RTO: 0 /100 WBC
PLATELET # BLD AUTO: 288 K/UL (ref 150–450)
PMV BLD AUTO: 8.8 FL (ref 9.2–12.9)
POTASSIUM SERPL-SCNC: 4.6 MMOL/L (ref 3.5–5.1)
PROT SERPL-MCNC: 6.8 G/DL (ref 6–8.4)
RBC # BLD AUTO: 4.45 M/UL (ref 4–5.4)
SODIUM SERPL-SCNC: 141 MMOL/L (ref 136–145)
TROPONIN I SERPL DL<=0.01 NG/ML-MCNC: <0.006 NG/ML (ref 0–0.03)
TROPONIN I SERPL DL<=0.01 NG/ML-MCNC: <0.006 NG/ML (ref 0–0.03)
WBC # BLD AUTO: 12.57 K/UL (ref 3.9–12.7)

## 2025-01-12 PROCEDURE — 99285 EMERGENCY DEPT VISIT HI MDM: CPT | Mod: 25

## 2025-01-12 PROCEDURE — 93005 ELECTROCARDIOGRAM TRACING: CPT

## 2025-01-12 PROCEDURE — 71045 X-RAY EXAM CHEST 1 VIEW: CPT | Mod: 26,,, | Performed by: RADIOLOGY

## 2025-01-12 PROCEDURE — 96375 TX/PRO/DX INJ NEW DRUG ADDON: CPT

## 2025-01-12 PROCEDURE — 36415 COLL VENOUS BLD VENIPUNCTURE: CPT | Performed by: STUDENT IN AN ORGANIZED HEALTH CARE EDUCATION/TRAINING PROGRAM

## 2025-01-12 PROCEDURE — 94761 N-INVAS EAR/PLS OXIMETRY MLT: CPT

## 2025-01-12 PROCEDURE — 83880 ASSAY OF NATRIURETIC PEPTIDE: CPT | Performed by: STUDENT IN AN ORGANIZED HEALTH CARE EDUCATION/TRAINING PROGRAM

## 2025-01-12 PROCEDURE — 71045 X-RAY EXAM CHEST 1 VIEW: CPT | Mod: TC

## 2025-01-12 PROCEDURE — 96374 THER/PROPH/DIAG INJ IV PUSH: CPT

## 2025-01-12 PROCEDURE — 85025 COMPLETE CBC W/AUTO DIFF WBC: CPT | Performed by: STUDENT IN AN ORGANIZED HEALTH CARE EDUCATION/TRAINING PROGRAM

## 2025-01-12 PROCEDURE — 80053 COMPREHEN METABOLIC PANEL: CPT | Performed by: STUDENT IN AN ORGANIZED HEALTH CARE EDUCATION/TRAINING PROGRAM

## 2025-01-12 PROCEDURE — 84484 ASSAY OF TROPONIN QUANT: CPT | Mod: 91 | Performed by: STUDENT IN AN ORGANIZED HEALTH CARE EDUCATION/TRAINING PROGRAM

## 2025-01-12 PROCEDURE — 85379 FIBRIN DEGRADATION QUANT: CPT | Performed by: STUDENT IN AN ORGANIZED HEALTH CARE EDUCATION/TRAINING PROGRAM

## 2025-01-12 PROCEDURE — 25000003 PHARM REV CODE 250: Performed by: STUDENT IN AN ORGANIZED HEALTH CARE EDUCATION/TRAINING PROGRAM

## 2025-01-12 PROCEDURE — 63600175 PHARM REV CODE 636 W HCPCS: Performed by: STUDENT IN AN ORGANIZED HEALTH CARE EDUCATION/TRAINING PROGRAM

## 2025-01-12 PROCEDURE — 93010 ELECTROCARDIOGRAM REPORT: CPT | Mod: ,,, | Performed by: INTERNAL MEDICINE

## 2025-01-12 RX ORDER — METOPROLOL TARTRATE 25 MG/1
50 TABLET, FILM COATED ORAL
Status: COMPLETED | OUTPATIENT
Start: 2025-01-12 | End: 2025-01-12

## 2025-01-12 RX ORDER — KETOROLAC TROMETHAMINE 30 MG/ML
15 INJECTION, SOLUTION INTRAMUSCULAR; INTRAVENOUS
Status: COMPLETED | OUTPATIENT
Start: 2025-01-12 | End: 2025-01-12

## 2025-01-12 RX ORDER — MORPHINE SULFATE 2 MG/ML
6 INJECTION, SOLUTION INTRAMUSCULAR; INTRAVENOUS
Status: COMPLETED | OUTPATIENT
Start: 2025-01-12 | End: 2025-01-12

## 2025-01-12 RX ORDER — KETOROLAC TROMETHAMINE 10 MG/1
10 TABLET, FILM COATED ORAL EVERY 6 HOURS PRN
Qty: 20 TABLET | Refills: 0 | Status: SHIPPED | OUTPATIENT
Start: 2025-01-12 | End: 2025-01-17

## 2025-01-12 RX ORDER — AMLODIPINE BESYLATE 2.5 MG/1
5 TABLET ORAL
Status: COMPLETED | OUTPATIENT
Start: 2025-01-12 | End: 2025-01-12

## 2025-01-12 RX ORDER — HYDRALAZINE HYDROCHLORIDE 20 MG/ML
10 INJECTION INTRAMUSCULAR; INTRAVENOUS
Status: COMPLETED | OUTPATIENT
Start: 2025-01-12 | End: 2025-01-12

## 2025-01-12 RX ADMIN — METOPROLOL TARTRATE 50 MG: 25 TABLET, FILM COATED ORAL at 04:01

## 2025-01-12 RX ADMIN — NITROGLYCERIN 0.5 INCH: 20 OINTMENT TOPICAL at 02:01

## 2025-01-12 RX ADMIN — AMLODIPINE BESYLATE 5 MG: 2.5 TABLET ORAL at 04:01

## 2025-01-12 RX ADMIN — MORPHINE SULFATE 6 MG: 2 INJECTION, SOLUTION INTRAMUSCULAR; INTRAVENOUS at 05:01

## 2025-01-12 RX ADMIN — KETOROLAC TROMETHAMINE 15 MG: 30 INJECTION, SOLUTION INTRAMUSCULAR; INTRAVENOUS at 03:01

## 2025-01-12 RX ADMIN — HYDRALAZINE HYDROCHLORIDE 10 MG: 20 INJECTION, SOLUTION INTRAMUSCULAR; INTRAVENOUS at 04:01

## 2025-01-12 NOTE — DISCHARGE INSTRUCTIONS
Follow up with your primary care physician    Follow up with Cardiology as referred if persistent chest pain    Take Toradol every 6 hours if left arm pain

## 2025-01-12 NOTE — ED PROVIDER NOTES
Encounter Date: 2025       History     Chief Complaint   Patient presents with    Arm Pain     64-year-old female with significant history of anxiety, osteoarthritis, chronic pain, COPD, GERD, hypertension, irritable bowel syndrome, palpitations.  She presents to ED with chief complaints of left-sided substernal chest pain with the radiation to the left arm, left lateral upper ribs with onset 3 hours with while walking.  She describes nature of pain as sharp.  Denies associated palpitations, shortness of breath or diaphoresis.  She denies associated cough, fever, chills.  She smokes tobacco every day denies alcohol use.    The history is provided by the patient. No  was used.     Review of patient's allergies indicates:   Allergen Reactions    Lisinopril      Past Medical History:   Diagnosis Date    Anxiety     Arthritis     osteoarthritis    Chronic pain     Chronic pain     COPD (chronic obstructive pulmonary disease)     GERD (gastroesophageal reflux disease)     History of stomach ulcers     Hypertension     IBS (irritable bowel syndrome)     [facility] 2004    Palpitations      Past Surgical History:   Procedure Laterality Date    APPENDECTOMY       SECTION      CHOLECYSTECTOMY      COLONOSCOPY      ? [place] in [place]    COLONOSCOPY  2015    Dr. Farfan   diverticulosis    COLONOSCOPY N/A 2020    Procedure: COLONOSCOPY;  Surgeon: Casey Farfan MD;  Location: [facility];  Service: General;  Laterality: N/A;    CYSTOSCOPY      ESOPHAGOGASTRODUODENOSCOPY N/A 2020    Procedure: ESOPHAGOGASTRODUODENOSCOPY (EGD);  Surgeon: Casey Farfan MD;  Location: [facility];  Service: General;  Laterality: N/A;  WITH BXS    ESOPHAGOGASTRODUODENOSCOPY N/A 2020    Procedure: EGD W/ DILAT (ESOPHAGOGASTRODUODENOSCOPY WITH DILATION);  Surgeon: Mike Tong MD;  Location: [facility];  Service: Endoscopy;  Laterality: N/A;    HERNIA REPAIR       HYSTERECTOMY  09/03/2014    with bso    LAPAROSCOPIC CHOLECYSTECTOMY N/A 05/26/2020    Procedure: CHOLECYSTECTOMY-LAPAROSCOPIC;  Surgeon: Casey Farfan MD;  Location: [facility] OR;  Service: General;  Laterality: N/A;    TONSILLECTOMY      TOTAL ABDOMINAL HYSTERECTOMY W/ BILATERAL SALPINGOOPHORECTOMY Bilateral      Family History   Problem Relation Name Age of Onset    Hypertension Mother Janina     Diabetes Sister Lanette/sister     Colon cancer Maternal Grandmother      Breast cancer Other GMGM      Social History     Tobacco Use    Smoking status: Every Day     Current packs/day: 0.50     Types: Cigarettes     Passive exposure: Past    Smokeless tobacco: Never   Substance Use Topics    Alcohol use: No    Drug use: Yes     Comment: methadone as prescribed     Review of Systems   Constitutional: Negative.    HENT: Negative.     Eyes: Negative.    Respiratory:  Positive for chest tightness.    Cardiovascular:  Positive for chest pain.   Endocrine: Negative.    Genitourinary: Negative.    Musculoskeletal:  Positive for myalgias.   Skin: Negative.    Neurological: Negative.    Hematological: Negative.    Psychiatric/Behavioral: Negative.     All other systems reviewed and are negative.      Physical Exam     Initial Vitals   BP Pulse Resp Temp SpO2   01/12/25 1349 01/12/25 1349 01/12/25 1349 01/12/25 1358 01/12/25 1349   (!) 164/111 107 20 97.6 °F (36.4 °C) 96 %      MAP       --                Physical Exam    Nursing note and vitals reviewed.  Constitutional: She appears well-developed.   HENT:   Head: Normocephalic.   Eyes: Pupils are equal, round, and reactive to light.   Neck: No JVD present.   Normal range of motion.  Pulmonary/Chest: Breath sounds normal. No respiratory distress. She has no wheezes. She has no rhonchi. She has no rales.   Abdominal: Abdomen is soft. Bowel sounds are normal.   Musculoskeletal:         General: Normal range of motion.      Cervical back: Normal range of motion.     Neurological: She  is alert and oriented to person, place, and time. She has normal strength. GCS score is 15. GCS eye subscore is 4. GCS verbal subscore is 5. GCS motor subscore is 6.   Skin: Skin is warm. Capillary refill takes less than 2 seconds.   Psychiatric: She has a normal mood and affect.         ED Course   Procedures  Labs Reviewed   CBC W/ AUTO DIFFERENTIAL - Abnormal       Result Value    WBC 12.57      RBC 4.45      Hemoglobin 13.5      Hematocrit 41.0      MCV 92      MCH 30.3      MCHC 32.9      RDW 13.1      Platelets 288      MPV 8.8 (*)     Immature Granulocytes 0.5      Gran # (ANC) 10.0 (*)     Immature Grans (Abs) 0.06 (*)     Lymph # 1.9      Mono # 0.6      Eos # 0.0      Baso # 0.05      nRBC 0      Gran % 79.1 (*)     Lymph % 14.9 (*)     Mono % 4.8      Eosinophil % 0.3      Basophil % 0.4      Differential Method Automated     COMPREHENSIVE METABOLIC PANEL    Sodium 141      Potassium 4.6      Chloride 102      CO2 26      Glucose 95      BUN 13      Creatinine 1.0      Calcium 10.3      Total Protein 6.8      Albumin 3.9      Total Bilirubin 0.2      Alkaline Phosphatase 96      AST 18      ALT 12      eGFR >60.0      Anion Gap 13     TROPONIN I    Troponin I <0.006     B-TYPE NATRIURETIC PEPTIDE    BNP 18     D DIMER, QUANTITATIVE    D-Dimer 0.41     TROPONIN I    Troponin I <0.006       EKG Readings: (Independently Interpreted)   Normal sinus rhythm, no STEMI       Imaging Results              X-Ray Chest AP Portable (Final result)  Result time 01/12/25 15:05:53      Final result by Tommy Oleary MD (01/12/25 15:05:53)                   Impression:      No acute cardiopulmonary abnormality appreciated radiographically.  No interval detrimental change in the radiographic appearance of the chest when compared to the previous exam.      Electronically signed by: Nick Oleary MD  Date:    01/12/2025  Time:    15:05               Narrative:    EXAMINATION:  XR CHEST AP PORTABLE    CLINICAL  HISTORY:  Chest Pain;    TECHNIQUE:  A single portable upright AP chest radiograph was acquired.    COMPARISON:  Chest x-ray-04/24/2023    FINDINGS:  The cardiomediastinal silhouette is normal in appearance.  No pulmonary vascular congestion appreciated. No airspace consolidation or pulmonary mass. No significant volume of pleural fluid or pneumothorax. The bones are unremarkable for age.                                       Medications   nitroGLYCERIN 2% TD oint ointment 0.5 inch (0.5 inches Topical (Top) Given 1/12/25 1415)   ketorolac injection 15 mg (15 mg Intravenous Given 1/12/25 1540)   amLODIPine tablet 5 mg (5 mg Oral Given 1/12/25 1611)   metoprolol tartrate (LOPRESSOR) tablet 50 mg (50 mg Oral Given 1/12/25 1610)   hydrALAZINE injection 10 mg (10 mg Intravenous Given 1/12/25 1612)   morphine injection 6 mg (6 mg Intravenous Given 1/12/25 1707)     Medical Decision Making  64-year-old female with chest pain, left arm pain.  Differential includes musculoskeletal pain, ACS, others  EKG shows normal sinus rhythm, no STEMI  Chest x-ray unremarkable  Troponin x2 normal, D-dimer normal, BNP normal, CBC, CMP unremarkable  Cardiac etiology not evident today  Treated for musculoskeletal pain today  Patient was seen and reevaluated. Patient's symptoms seem to be stable. I discussed the patient's diagnosis, treatment plan, and plan for discharge with the patient. Patient was instructed to follow up with PCP, cardiology and was given strict return precautions to the ED. The patient voiced understanding and agreed with the plan      Amount and/or Complexity of Data Reviewed  Labs: ordered.  Radiology: ordered.    Risk  Prescription drug management.                                      Clinical Impression:  Final diagnoses:  [R07.9] Chest pain  [M79.602] Pain of left upper extremity (Primary)  [R07.89] Atypical chest pain          ED Disposition Condition    Discharge Stable          ED Prescriptions       Medication  Sig Dispense Start Date End Date Auth. Provider    ketorolac (TORADOL) 10 mg tablet Take 1 tablet (10 mg total) by mouth every 6 (six) hours as needed for Pain. 20 tablet 1/12/2025 1/17/2025 Remi Giron MD          Follow-up Information       Follow up With Specialties Details Why Contact Info    Tremaine Fuentes MD Family Medicine  As needed [address]  [facility] [address]  [phone]               Remi Giron MD  01/12/25 4928

## 2025-01-13 LAB
OHS QRS DURATION: 74 MS
OHS QTC CALCULATION: 442 MS

## 2025-01-19 ENCOUNTER — PATIENT MESSAGE (OUTPATIENT)
Dept: FAMILY MEDICINE | Facility: CLINIC | Age: 65
End: 2025-01-19
Payer: MEDICARE

## 2025-03-26 ENCOUNTER — PATIENT MESSAGE (OUTPATIENT)
Dept: FAMILY MEDICINE | Facility: CLINIC | Age: 65
End: 2025-03-26
Payer: MEDICARE

## 2025-04-09 ENCOUNTER — HOSPITAL ENCOUNTER (OUTPATIENT)
Dept: RADIOLOGY | Facility: HOSPITAL | Age: 65
Discharge: HOME | End: 2025-04-09
Attending: FAMILY MEDICINE
Payer: MEDICARE

## 2025-04-09 DIAGNOSIS — Z12.31 ENCOUNTER FOR SCREENING MAMMOGRAM FOR BREAST CANCER: ICD-10-CM

## 2025-04-09 PROCEDURE — 77067 SCR MAMMO BI INCL CAD: CPT | Mod: 26,,, | Performed by: RADIOLOGY

## 2025-04-09 PROCEDURE — 77063 BREAST TOMOSYNTHESIS BI: CPT | Mod: TC

## 2025-04-09 PROCEDURE — 77063 BREAST TOMOSYNTHESIS BI: CPT | Mod: 26,,, | Performed by: RADIOLOGY

## 2025-04-10 ENCOUNTER — RESULTS FOLLOW-UP (OUTPATIENT)
Dept: FAMILY MEDICINE | Facility: CLINIC | Age: 65
End: 2025-04-10

## 2025-04-24 ENCOUNTER — OFFICE VISIT (OUTPATIENT)
Dept: FAMILY MEDICINE | Facility: CLINIC | Age: 65
End: 2025-04-24
Payer: MEDICARE

## 2025-04-24 ENCOUNTER — HOSPITAL ENCOUNTER (OUTPATIENT)
Dept: RADIOLOGY | Facility: HOSPITAL | Age: 65
Discharge: HOME | End: 2025-04-24
Attending: NURSE PRACTITIONER
Payer: MEDICARE

## 2025-04-24 ENCOUNTER — RESULTS FOLLOW-UP (OUTPATIENT)
Dept: FAMILY MEDICINE | Facility: CLINIC | Age: 65
End: 2025-04-24

## 2025-04-24 VITALS
HEART RATE: 74 BPM | SYSTOLIC BLOOD PRESSURE: 128 MMHG | OXYGEN SATURATION: 97 % | HEIGHT: 63 IN | DIASTOLIC BLOOD PRESSURE: 76 MMHG | BODY MASS INDEX: 26.08 KG/M2 | WEIGHT: 147.19 LBS | RESPIRATION RATE: 15 BRPM

## 2025-04-24 DIAGNOSIS — R05.9 COUGH, UNSPECIFIED TYPE: ICD-10-CM

## 2025-04-24 DIAGNOSIS — F33.1 MODERATE EPISODE OF RECURRENT MAJOR DEPRESSIVE DISORDER: ICD-10-CM

## 2025-04-24 DIAGNOSIS — E55.9 VITAMIN D DEFICIENCY: ICD-10-CM

## 2025-04-24 DIAGNOSIS — J44.9 CHRONIC OBSTRUCTIVE PULMONARY DISEASE, UNSPECIFIED COPD TYPE: ICD-10-CM

## 2025-04-24 DIAGNOSIS — K21.9 GASTROESOPHAGEAL REFLUX DISEASE, UNSPECIFIED WHETHER ESOPHAGITIS PRESENT: ICD-10-CM

## 2025-04-24 DIAGNOSIS — R11.0 NAUSEA: ICD-10-CM

## 2025-04-24 DIAGNOSIS — R09.89 CHEST CONGESTION: ICD-10-CM

## 2025-04-24 DIAGNOSIS — J20.9 BRONCHITIS, SUBACUTE: ICD-10-CM

## 2025-04-24 DIAGNOSIS — R05.9 COUGH, UNSPECIFIED TYPE: Primary | ICD-10-CM

## 2025-04-24 DIAGNOSIS — I10 ESSENTIAL HYPERTENSION: ICD-10-CM

## 2025-04-24 DIAGNOSIS — Z86.69 H/O CARPAL TUNNEL SYNDROME: ICD-10-CM

## 2025-04-24 PROCEDURE — 99214 OFFICE O/P EST MOD 30 MIN: CPT | Mod: S$GLB,,, | Performed by: NURSE PRACTITIONER

## 2025-04-24 PROCEDURE — 99999 PR PBB SHADOW E&M-EST. PATIENT-LVL III: CPT | Mod: PBBFAC,,, | Performed by: NURSE PRACTITIONER

## 2025-04-24 PROCEDURE — 71046 X-RAY EXAM CHEST 2 VIEWS: CPT | Mod: 26,,, | Performed by: RADIOLOGY

## 2025-04-24 PROCEDURE — 71046 X-RAY EXAM CHEST 2 VIEWS: CPT | Mod: TC,PN

## 2025-04-24 RX ORDER — BENZONATATE 200 MG/1
200 CAPSULE ORAL 3 TIMES DAILY PRN
Qty: 30 CAPSULE | Refills: 0 | Status: SHIPPED | OUTPATIENT
Start: 2025-04-24

## 2025-04-24 RX ORDER — ONDANSETRON 4 MG/1
TABLET, FILM COATED ORAL
Qty: 20 TABLET | Refills: 11 | Status: SHIPPED | OUTPATIENT
Start: 2025-04-24

## 2025-04-24 RX ORDER — FAMOTIDINE 40 MG/1
40 TABLET, FILM COATED ORAL NIGHTLY PRN
Qty: 90 TABLET | Refills: 3 | Status: SHIPPED | OUTPATIENT
Start: 2025-04-24

## 2025-04-24 RX ORDER — METOPROLOL TARTRATE 50 MG/1
50 TABLET ORAL 2 TIMES DAILY
Qty: 180 TABLET | Refills: 1 | Status: SHIPPED | OUTPATIENT
Start: 2025-04-24

## 2025-04-24 RX ORDER — DICYCLOMINE HYDROCHLORIDE 10 MG/1
10 CAPSULE ORAL 4 TIMES DAILY PRN
Qty: 120 CAPSULE | Refills: 3 | Status: SHIPPED | OUTPATIENT
Start: 2025-04-24

## 2025-04-24 RX ORDER — GABAPENTIN 800 MG/1
800 TABLET ORAL 3 TIMES DAILY
Qty: 270 TABLET | Refills: 1 | Status: SHIPPED | OUTPATIENT
Start: 2025-04-24

## 2025-04-24 RX ORDER — IPRATROPIUM BROMIDE AND ALBUTEROL SULFATE 2.5; .5 MG/3ML; MG/3ML
SOLUTION RESPIRATORY (INHALATION)
COMMUNITY

## 2025-04-24 RX ORDER — PANTOPRAZOLE SODIUM 40 MG/1
40 TABLET, DELAYED RELEASE ORAL DAILY
Qty: 90 TABLET | Refills: 3 | Status: SHIPPED | OUTPATIENT
Start: 2025-04-24

## 2025-04-24 RX ORDER — ERGOCALCIFEROL 1.25 MG/1
50000 CAPSULE ORAL
Qty: 12 CAPSULE | Refills: 3 | Status: SHIPPED | OUTPATIENT
Start: 2025-04-24

## 2025-04-24 RX ORDER — DULOXETIN HYDROCHLORIDE 30 MG/1
30 CAPSULE, DELAYED RELEASE ORAL DAILY
Qty: 30 CAPSULE | Refills: 11 | Status: SHIPPED | OUTPATIENT
Start: 2025-04-24 | End: 2026-04-24

## 2025-04-24 RX ORDER — AMLODIPINE BESYLATE 5 MG/1
5 TABLET ORAL DAILY
Qty: 90 TABLET | Refills: 1 | Status: SHIPPED | OUTPATIENT
Start: 2025-04-24

## 2025-04-24 RX ORDER — FLUTICASONE PROPIONATE 50 MCG
SPRAY, SUSPENSION (ML) NASAL
COMMUNITY

## 2025-04-24 RX ORDER — CHLOPHEDIANOL HCL AND PYRILAMINE MALEATE 12.5; 12.5 MG/5ML; MG/5ML
5 SOLUTION ORAL
Qty: 240 ML | Refills: 0 | Status: SHIPPED | OUTPATIENT
Start: 2025-04-24

## 2025-04-24 RX ORDER — TIOTROPIUM BROMIDE AND OLODATEROL 3.124; 2.736 UG/1; UG/1
SPRAY, METERED RESPIRATORY (INHALATION)
Qty: 12 G | Refills: 11 | Status: SHIPPED | OUTPATIENT
Start: 2025-04-24

## 2025-04-24 NOTE — PROGRESS NOTES
To Subjective:       Patient ID: Janina Klein is a 65 y.o. female.    Chief Complaint: Annual Exam, Cough (Cough ongoing from last month from suspected pneumonia, began last month.), and Medication Refill    Janina Klein presents to the clinic today for medication refills, ongoing cough     Under the care of the following:  PCP: Dr. Gonzáles    Patient Active Problem List    Diagnosis  · Essential hypertension  · Abdominal pain  · History of abnormal Pap smear  · Sleep disorder  · Chronic bilateral low back pain with bilateral sciatica  · Heavy cigarette smoker  · Gastroesophageal reflux disease  · Anxiety  · Osteoarthritis of lumbar spine  · Osteoarthritis of cervical spine  · BMI 24.0-24.9, adult  · Yousif's esophagus without dysplasia  · COPD (chronic obstructive pulmonary disease)  · Dyslipidemia (high LDL; low HDL)  · Right upper quadrant abdominal pain  · Hiatal hernia  · Gastritis  · Duodenitis  · Esophagitis  · Diverticulosis of colon  · History of laparoscopic cholecystectomy  · Diarrhea  · History of esophageal stricture  · COVID  · Primary insomnia  · Osteoarthritis of spine with radiculopathy, lumbar region      Reports used [employer] with her Insurance for cough/chest congestion end of last month  Was prescribed the following:   Augmentin/ Z pack, Prednisone   Reports completed the medications as prescribed, but has continued to experience a lagging deep cough           Review of Systems   Constitutional:  Positive for fatigue.   HENT: Negative.     Respiratory:  Positive for cough and chest tightness.    Cardiovascular:  Negative for chest pain, palpitations and leg swelling.   Gastrointestinal: Negative.    Genitourinary: Negative.    Musculoskeletal:  Positive for arthralgias and back pain.   Skin: Negative.    Neurological: Negative.    Psychiatric/Behavioral:  Positive for dysphoric mood and sleep disturbance.        Problem List[1]    Objective:      Physical Exam    Lab Results   Component  "Value Date    WBC 12.57 01/12/2025    HGB 13.5 01/12/2025    HCT 41.0 01/12/2025     01/12/2025    CHOL 135 06/11/2024    TRIG 104 06/11/2024    HDL 34 (L) 06/11/2024    ALT 12 01/12/2025    AST 18 01/12/2025     01/12/2025    K 4.6 01/12/2025     01/12/2025    CREATININE 1.0 01/12/2025    BUN 13 01/12/2025    CO2 26 01/12/2025    TSH 1.439 07/20/2022    HGBA1C 5.1 07/20/2022     The 10-year ASCVD risk score (Rosenda FITZPATRICK, et al., 2019) is: 11.6%    Values used to calculate the score:      Age: 65 years      Sex: Female      Is Non- : No      Diabetic: No      Tobacco smoker: Yes      Systolic Blood Pressure: 128 mmHg      Is BP treated: Yes      HDL Cholesterol: 34 mg/dL      Total Cholesterol: 135 mg/dL  Visit Vitals  /76 (BP Location: Right arm, Patient Position: Sitting)   Pulse 74   Resp 15   Ht 5' 3" (1.6 m)   Wt 66.8 kg (147 lb 3.2 oz)   SpO2 97%   BMI 26.08 kg/m²      Assessment:       1. Cough, unspecified type    2. Chest congestion    3. Chronic obstructive pulmonary disease, unspecified COPD type    4. Essential hypertension    5. H/O carpal tunnel syndrome    6. Gastroesophageal reflux disease, unspecified whether esophagitis present    7. Nausea    8. Vitamin D deficiency    9. Moderate episode of recurrent major depressive disorder    10. Bronchitis, subacute        Plan:       1. Cough, unspecified type  -     X-Ray Chest PA And Lateral; Future; Expected date: 04/24/2025  -     CBC Auto Differential; Future; Expected date: 04/24/2025  -     benzonatate (TESSALON) 200 MG capsule; Take 1 capsule (200 mg total) by mouth 3 (three) times daily as needed for Cough.  Dispense: 30 capsule; Refill: 0  -     pyrilamine-chlophedianoL (NINJACOF) 12.5-12.5 mg/5 mL Liqd; Take 5 mLs by mouth every 6 to 8 hours as needed (cough).  Dispense: 240 mL; Refill: 0  Tessalon pearls 3 times daily- morning, mid day, evening  Ninjacof as directed   2. Chest congestion  -     X-Ray " Chest PA And Lateral; Future; Expected date: 04/24/2025  -     CBC Auto Differential; Future; Expected date: 04/24/2025  -     BNP; Future; Expected date: 04/24/2025  Obtain image for review  Obtain blood work for review.   3. Chronic obstructive pulmonary disease, unspecified COPD type  -     CBC Auto Differential; Future; Expected date: 04/24/2025  -     Comprehensive Metabolic Panel; Future; Expected date: 04/24/2025  -     tiotropium-olodateroL (STIOLTO RESPIMAT) 2.5-2.5 mcg/actuation Mist; INHALE 2 PUFFS INTO THE LUNGS EVERY DAY  Dispense: 12 g; Refill: 11  Smoking cessation encouraged.   Continue inhaler as prescribed   4. Essential hypertension  -     amLODIPine (NORVASC) 5 MG tablet; Take 1 tablet (5 mg total) by mouth once daily.  Dispense: 90 tablet; Refill: 1  -     metoprolol tartrate (LOPRESSOR) 50 MG tablet; Take 1 tablet (50 mg total) by mouth 2 (two) times daily.  Dispense: 180 tablet; Refill: 1  Continue current medication regimen   5. H/O carpal tunnel syndrome  -     gabapentin (NEURONTIN) 800 MG tablet; Take 1 tablet (800 mg total) by mouth 3 (three) times daily.  Dispense: 270 tablet; Refill: 1  Continue current medication regimen   6. Gastroesophageal reflux disease, unspecified whether esophagitis present  -     pantoprazole (PROTONIX) 40 MG tablet; Take 1 tablet (40 mg total) by mouth once daily.  Dispense: 90 tablet; Refill: 3  -     famotidine (PEPCID) 40 MG tablet; Take 1 tablet (40 mg total) by mouth nightly as needed for Heartburn.  Dispense: 90 tablet; Refill: 3  -     dicyclomine (BENTYL) 10 MG capsule; Take 1 capsule (10 mg total) by mouth 4 (four) times daily as needed (abdominal pain).  Dispense: 120 capsule; Refill: 3  Continue current medication regimen along with GERD diet   7. Nausea  -     ondansetron (ZOFRAN) 4 MG tablet; TAKE 1 TABLET(4 MG) BY MOUTH EVERY 6 HOURS AS NEEDED  Dispense: 20 tablet; Refill: 11    8. Vitamin D deficiency  -     ergocalciferol (ERGOCALCIFEROL)  50,000 unit Cap; Take 1 capsule (50,000 Units total) by mouth every 7 days.  Dispense: 12 capsule; Refill: 3    9. Moderate episode of recurrent major depressive disorder  -     DULoxetine (CYMBALTA) 30 MG capsule; Take 1 capsule (30 mg total) by mouth once daily.  Dispense: 30 capsule; Refill: 11  Restart Duloxetine- reports a lot of external stressors worsening depression.   Follow up short term on restarting medication  Avoid stopping medication cold turkey, notify office of any worsening mood, thoughts, or behaviors.   10. Bronchitis, subacute  -     pyrilamine-chlophedianoL (NINJACOF) 12.5-12.5 mg/5 mL Liqd; Take 5 mLs by mouth every 6 to 8 hours as needed (cough).  Dispense: 240 mL; Refill: 0       Follow up in about 3 months (around 7/24/2025).      Future Appointments       Date Provider Specialty Appt Notes    7/24/2025 Pepper Gonzáles MD Family Medicine 3 MONTH FOLLOW UP                [1]   Patient Active Problem List  Diagnosis    Essential hypertension    Abdominal pain    History of abnormal Pap smear    Sleep disorder    Chronic bilateral low back pain with bilateral sciatica    Heavy cigarette smoker    Gastroesophageal reflux disease    Anxiety    Osteoarthritis of lumbar spine    Osteoarthritis of cervical spine    BMI 24.0-24.9, adult    Yousif's esophagus without dysplasia    COPD (chronic obstructive pulmonary disease)    Dyslipidemia (high LDL; low HDL)    Right upper quadrant abdominal pain    Hiatal hernia    Gastritis    Duodenitis    Esophagitis    Diverticulosis of colon    History of laparoscopic cholecystectomy    Diarrhea    History of esophageal stricture    COVID    Primary insomnia    Osteoarthritis of spine with radiculopathy, lumbar region

## 2025-05-15 ENCOUNTER — PATIENT MESSAGE (OUTPATIENT)
Dept: FAMILY MEDICINE | Facility: CLINIC | Age: 65
End: 2025-05-15
Payer: MEDICARE

## 2025-06-17 DIAGNOSIS — Z86.69 H/O CARPAL TUNNEL SYNDROME: ICD-10-CM

## 2025-06-18 RX ORDER — GABAPENTIN 800 MG/1
800 TABLET ORAL 3 TIMES DAILY
Qty: 270 TABLET | Refills: 1 | Status: SHIPPED | OUTPATIENT
Start: 2025-06-18

## 2025-06-18 NOTE — TELEPHONE ENCOUNTER
Refill Routing Note   Medication(s) are not appropriate for processing by [facility] for the following reason(s):        Outside of protocol    [facility] action(s):  Route      Medication Therapy Plan: Patient comment: for some reason my refills aren't showing on [facility] account      Appointments  past 12m or future 3m with PCP    Date Provider   Last Visit   7/5/2024 Pepper Gonzáles MD   Next Visit   7/24/2025 Pepper Gonzáles MD   ED visits in past 90 days: 0        Note composed:7:33 AM 06/18/2025

## 2025-06-23 DIAGNOSIS — Z86.69 H/O CARPAL TUNNEL SYNDROME: ICD-10-CM

## 2025-06-23 DIAGNOSIS — M62.838 MASSETER MUSCLE SPASM: ICD-10-CM

## 2025-06-23 RX ORDER — GABAPENTIN 800 MG/1
800 TABLET ORAL 3 TIMES DAILY
Qty: 270 TABLET | Refills: 1 | OUTPATIENT
Start: 2025-06-23

## 2025-06-23 NOTE — TELEPHONE ENCOUNTER
No care due was identified.  [facility] Embedded Care Due Messages. Reference number: [id number].   6/23/2025 9:59:37 AM CDT

## 2025-06-23 NOTE — TELEPHONE ENCOUNTER
No care due was identified.  [facility] Embedded Care Due Messages. Reference number: [id number].   6/23/2025 10:00:12 AM CDT

## 2025-06-25 RX ORDER — TIZANIDINE 4 MG/1
4 TABLET ORAL 3 TIMES DAILY PRN
Qty: 180 TABLET | Refills: 2 | Status: SHIPPED | OUTPATIENT
Start: 2025-06-25

## 2025-07-08 ENCOUNTER — TELEPHONE (OUTPATIENT)
Dept: FAMILY MEDICINE | Facility: CLINIC | Age: 65
End: 2025-07-08
Payer: MEDICARE

## 2025-07-08 NOTE — TELEPHONE ENCOUNTER
Got a recording stating the phone number has been disconnected or changed with no forwarding number provided.

## 2025-07-21 ENCOUNTER — PATIENT MESSAGE (OUTPATIENT)
Dept: ADMINISTRATIVE | Facility: HOSPITAL | Age: 65
End: 2025-07-21
Payer: MEDICARE

## 2025-08-20 ENCOUNTER — OFFICE VISIT (OUTPATIENT)
Dept: FAMILY MEDICINE | Facility: CLINIC | Age: 65
End: 2025-08-20
Payer: COMMERCIAL

## 2025-08-20 VITALS
HEART RATE: 64 BPM | WEIGHT: 143.5 LBS | BODY MASS INDEX: 25.43 KG/M2 | OXYGEN SATURATION: 98 % | HEIGHT: 63 IN | SYSTOLIC BLOOD PRESSURE: 148 MMHG | DIASTOLIC BLOOD PRESSURE: 86 MMHG

## 2025-08-20 DIAGNOSIS — F41.9 ANXIETY: ICD-10-CM

## 2025-08-20 DIAGNOSIS — I10 ESSENTIAL HYPERTENSION: Primary | ICD-10-CM

## 2025-08-20 DIAGNOSIS — Z13.29 SCREENING FOR THYROID DISORDER: ICD-10-CM

## 2025-08-20 DIAGNOSIS — Z13.1 SCREENING FOR DIABETES MELLITUS: ICD-10-CM

## 2025-08-20 DIAGNOSIS — E78.5 DYSLIPIDEMIA (HIGH LDL; LOW HDL): ICD-10-CM

## 2025-08-20 PROCEDURE — 99214 OFFICE O/P EST MOD 30 MIN: CPT | Mod: S$GLB,,,

## 2025-08-20 PROCEDURE — 99999 PR PBB SHADOW E&M-EST. PATIENT-LVL V: CPT | Mod: PBBFAC,,,

## 2025-08-21 ENCOUNTER — LAB VISIT (OUTPATIENT)
Dept: LAB | Facility: HOSPITAL | Age: 65
End: 2025-08-21
Payer: COMMERCIAL

## 2025-08-21 DIAGNOSIS — Z13.1 SCREENING FOR DIABETES MELLITUS: ICD-10-CM

## 2025-08-21 DIAGNOSIS — Z13.29 SCREENING FOR THYROID DISORDER: ICD-10-CM

## 2025-08-21 DIAGNOSIS — E78.5 DYSLIPIDEMIA (HIGH LDL; LOW HDL): ICD-10-CM

## 2025-08-21 LAB
CHOLEST SERPL-MCNC: 212 MG/DL (ref 120–199)
CHOLEST/HDLC SERPL: 4.9 {RATIO} (ref 2–5)
EAG (OHS): 105 MG/DL (ref 68–131)
HBA1C MFR BLD: 5.3 % (ref 4–5.6)
HDLC SERPL-MCNC: 43 MG/DL (ref 40–75)
HDLC SERPL: 20.3 % (ref 20–50)
LDLC SERPL CALC-MCNC: 128.4 MG/DL (ref 63–159)
NONHDLC SERPL-MCNC: 169 MG/DL
TRIGL SERPL-MCNC: 203 MG/DL (ref 30–150)
TSH SERPL-ACNC: 3.35 UIU/ML (ref 0.4–4)

## 2025-08-21 PROCEDURE — 84443 ASSAY THYROID STIM HORMONE: CPT

## 2025-08-21 PROCEDURE — 36415 COLL VENOUS BLD VENIPUNCTURE: CPT

## 2025-08-21 PROCEDURE — 83036 HEMOGLOBIN GLYCOSYLATED A1C: CPT

## 2025-08-21 PROCEDURE — 80061 LIPID PANEL: CPT

## 2025-08-22 RX ORDER — AMLODIPINE BESYLATE 5 MG/1
10 TABLET ORAL DAILY
Start: 2025-08-22

## (undated) DEVICE — SLEEVE SCD EXPRESS CALF MEDIUM

## (undated) DEVICE — SEE MEDLINE ITEM 156964

## (undated) DEVICE — CANISTER SUCTION 3000CC

## (undated) DEVICE — SPATULA CURVED 33CM HC BLACK

## (undated) DEVICE — IRRIGATOR SUCTION W/TIP

## (undated) DEVICE — SEE MEDLINE ITEM 157116

## (undated) DEVICE — SOL CLEARIFY VISUALIZATION LAP

## (undated) DEVICE — DILATOR CRE 10-12MM

## (undated) DEVICE — SUT 0 VICRYL / UR6 (J603)

## (undated) DEVICE — SCISSOR TIP ENDOCUT DISPOSABLE

## (undated) DEVICE — CANNULA LAP SEAL Z THRD 5X100

## (undated) DEVICE — COVER LIGHT HANDLE 80/CA

## (undated) DEVICE — CLIPPER BLADE MOD 4406 (CAREF)

## (undated) DEVICE — DRAPE ABDOMINAL TIBURON 14X11

## (undated) DEVICE — SYR SLIP TIP 5CC

## (undated) DEVICE — ADHESIVE DERMABOND ADVANCED

## (undated) DEVICE — KIT ENDO CARRY-ON PROC 100310

## (undated) DEVICE — UNDERGLOVE BIOGEL PI SZ 6.5 LF

## (undated) DEVICE — TROCAR ENDO Z THREAD KII 5X100

## (undated) DEVICE — GLOVE SURGEONS ULTRA TOUCH 6.5

## (undated) DEVICE — SOL IRR NACL .9% 3000ML

## (undated) DEVICE — SUT CTD VICRYL 4-0 P-3 18IN

## (undated) DEVICE — CATH ESOPH 7.5X15-18X5.5X240

## (undated) DEVICE — KIT ENDO CARRY ON PROCEDURE

## (undated) DEVICE — FORCEP BIOSY RJ 4 HOT 2.2X240

## (undated) DEVICE — BITE BLOCK ADULT LATEX FREE

## (undated) DEVICE — GLOVE SURG ULTRA TOUCH 7.5

## (undated) DEVICE — GLOVE PROTEXIS PI SYN SURG 6.5

## (undated) DEVICE — SOL WATER STRL IRR 1000ML

## (undated) DEVICE — TUBING HEATED INSUFFLATOR

## (undated) DEVICE — APPLIER CLIP ENDO LIGAMAX 5MM

## (undated) DEVICE — Device

## (undated) DEVICE — TROCAR KII BLLN 12MM 10CM

## (undated) DEVICE — SUT 3-0 VICRYL / SH (J416)

## (undated) DEVICE — DRAPE STERILE Z BACK TABLE

## (undated) DEVICE — GOWN SURGICAL XX LARGE X LONG

## (undated) DEVICE — SOL IRRI STRL WATER 1000ML

## (undated) DEVICE — ELECTRODE REM PLYHSV RETURN 9

## (undated) DEVICE — FILTER LAPAROSCOPIC SMOKE EVAC